# Patient Record
Sex: MALE | Race: WHITE | NOT HISPANIC OR LATINO | Employment: FULL TIME | URBAN - METROPOLITAN AREA
[De-identification: names, ages, dates, MRNs, and addresses within clinical notes are randomized per-mention and may not be internally consistent; named-entity substitution may affect disease eponyms.]

---

## 2018-11-01 ENCOUNTER — COMMUNICATION - HEALTHEAST (OUTPATIENT)
Dept: TELEHEALTH | Facility: CLINIC | Age: 43
End: 2018-11-01

## 2018-11-01 ENCOUNTER — OFFICE VISIT - HEALTHEAST (OUTPATIENT)
Dept: FAMILY MEDICINE | Facility: CLINIC | Age: 43
End: 2018-11-01

## 2018-11-01 ENCOUNTER — AMBULATORY - HEALTHEAST (OUTPATIENT)
Dept: FAMILY MEDICINE | Facility: CLINIC | Age: 43
End: 2018-11-01

## 2018-11-01 DIAGNOSIS — N52.9 ED (ERECTILE DYSFUNCTION): ICD-10-CM

## 2018-11-01 DIAGNOSIS — E66.9 OBESITY: ICD-10-CM

## 2018-11-01 DIAGNOSIS — E78.00 HYPERCHOLESTEREMIA: ICD-10-CM

## 2018-11-01 DIAGNOSIS — E88.810 METABOLIC SYNDROME X: ICD-10-CM

## 2018-11-01 DIAGNOSIS — R73.01 ELEVATED FASTING GLUCOSE: ICD-10-CM

## 2018-11-01 DIAGNOSIS — I10 ESSENTIAL HYPERTENSION: ICD-10-CM

## 2018-11-01 LAB
ANION GAP SERPL CALCULATED.3IONS-SCNC: 12 MMOL/L (ref 5–18)
BUN SERPL-MCNC: 16 MG/DL (ref 8–22)
CALCIUM SERPL-MCNC: 9.6 MG/DL (ref 8.5–10.5)
CHLORIDE BLD-SCNC: 104 MMOL/L (ref 98–107)
CHOLEST SERPL-MCNC: 241 MG/DL
CO2 SERPL-SCNC: 24 MMOL/L (ref 22–31)
CREAT SERPL-MCNC: 1.19 MG/DL (ref 0.7–1.3)
FASTING STATUS PATIENT QL REPORTED: YES
GFR SERPL CREATININE-BSD FRML MDRD: >60 ML/MIN/1.73M2
GLUCOSE BLD-MCNC: 100 MG/DL (ref 70–125)
HBA1C MFR BLD: 5.4 % (ref 3.5–6)
HDLC SERPL-MCNC: 35 MG/DL
LDLC SERPL CALC-MCNC: 176 MG/DL
POTASSIUM BLD-SCNC: 4.7 MMOL/L (ref 3.5–5)
SODIUM SERPL-SCNC: 140 MMOL/L (ref 136–145)
TRIGL SERPL-MCNC: 151 MG/DL

## 2018-11-01 ASSESSMENT — MIFFLIN-ST. JEOR: SCORE: 2101.43

## 2018-11-23 ENCOUNTER — AMBULATORY - HEALTHEAST (OUTPATIENT)
Dept: NURSING | Facility: CLINIC | Age: 43
End: 2018-11-23

## 2018-11-23 DIAGNOSIS — I10 ESSENTIAL HYPERTENSION: ICD-10-CM

## 2018-11-26 ENCOUNTER — COMMUNICATION - HEALTHEAST (OUTPATIENT)
Dept: FAMILY MEDICINE | Facility: CLINIC | Age: 43
End: 2018-11-26

## 2019-04-25 ENCOUNTER — OFFICE VISIT - HEALTHEAST (OUTPATIENT)
Dept: FAMILY MEDICINE | Facility: CLINIC | Age: 44
End: 2019-04-25

## 2019-04-25 DIAGNOSIS — I10 ESSENTIAL HYPERTENSION: ICD-10-CM

## 2019-04-25 DIAGNOSIS — R06.83 SNORES: ICD-10-CM

## 2019-04-25 LAB
ANION GAP SERPL CALCULATED.3IONS-SCNC: 18 MMOL/L (ref 5–18)
BUN SERPL-MCNC: 21 MG/DL (ref 8–22)
CALCIUM SERPL-MCNC: 10.4 MG/DL (ref 8.5–10.5)
CHLORIDE BLD-SCNC: 103 MMOL/L (ref 98–107)
CO2 SERPL-SCNC: 20 MMOL/L (ref 22–31)
CREAT SERPL-MCNC: 1.21 MG/DL (ref 0.7–1.3)
GFR SERPL CREATININE-BSD FRML MDRD: >60 ML/MIN/1.73M2
GLUCOSE BLD-MCNC: 98 MG/DL (ref 70–125)
POTASSIUM BLD-SCNC: 4.3 MMOL/L (ref 3.5–5)
SODIUM SERPL-SCNC: 141 MMOL/L (ref 136–145)

## 2019-04-25 ASSESSMENT — MIFFLIN-ST. JEOR: SCORE: 2085.56

## 2019-07-20 ENCOUNTER — APPOINTMENT (OUTPATIENT)
Dept: GENERAL RADIOLOGY | Facility: CLINIC | Age: 44
End: 2019-07-20
Attending: EMERGENCY MEDICINE
Payer: COMMERCIAL

## 2019-07-20 ENCOUNTER — HOSPITAL ENCOUNTER (EMERGENCY)
Facility: CLINIC | Age: 44
Discharge: HOME | End: 2019-07-20
Attending: EMERGENCY MEDICINE | Admitting: EMERGENCY MEDICINE
Payer: COMMERCIAL

## 2019-07-20 ENCOUNTER — RECORDS - HEALTHEAST (OUTPATIENT)
Dept: ADMINISTRATIVE | Facility: OTHER | Age: 44
End: 2019-07-20

## 2019-07-20 VITALS
HEIGHT: 69 IN | DIASTOLIC BLOOD PRESSURE: 72 MMHG | SYSTOLIC BLOOD PRESSURE: 141 MMHG | TEMPERATURE: 98.5 F | HEART RATE: 80 BPM | RESPIRATION RATE: 12 BRPM | BODY MASS INDEX: 38.51 KG/M2 | WEIGHT: 260 LBS | OXYGEN SATURATION: 98 %

## 2019-07-20 DIAGNOSIS — S93.401A SPRAIN OF RIGHT ANKLE, UNSPECIFIED LIGAMENT, INITIAL ENCOUNTER: ICD-10-CM

## 2019-07-20 PROCEDURE — 73610 X-RAY EXAM OF ANKLE: CPT | Mod: RT

## 2019-07-20 PROCEDURE — 99284 EMERGENCY DEPT VISIT MOD MDM: CPT | Mod: 25

## 2019-07-20 PROCEDURE — 73630 X-RAY EXAM OF FOOT: CPT | Mod: RT

## 2019-07-20 PROCEDURE — 99283 EMERGENCY DEPT VISIT LOW MDM: CPT | Mod: Z6 | Performed by: EMERGENCY MEDICINE

## 2019-07-20 RX ORDER — OLMESARTAN MEDOXOMIL AND HYDROCHLOROTHIAZIDE 40/25 40; 25 MG/1; MG/1
1 TABLET ORAL DAILY
COMMUNITY
End: 2022-04-05

## 2019-07-20 ASSESSMENT — ENCOUNTER SYMPTOMS
FEVER: 0
BRUISES/BLEEDS EASILY: 0
WOUND: 0
RESPIRATORY NEGATIVE: 1
GASTROINTESTINAL NEGATIVE: 1

## 2019-07-20 ASSESSMENT — MIFFLIN-ST. JEOR: SCORE: 2054.73

## 2019-07-20 NOTE — ED AVS SNAPSHOT
[facility], [place], Emergency Department  [address] [place] [address]  Phone:  [phone]                                    Yifan Quesada   MRN: [id number]    Department:  [facility], Emergency Department   Date of Visit:  7/20/2019           After Visit Summary Signature Page    I have received my discharge instructions, and my questions have been answered. I have discussed any challenges I see with this plan with the nurse or doctor.    ..........................................................................................................................................  Patient/Patient Representative Signature      ..........................................................................................................................................  Patient Representative Print Name and Relationship to Patient    ..................................................               ................................................  Date                                   Time    ..........................................................................................................................................  Reviewed by Signature/Title    ...................................................              ..............................................  Date                                               Time          22EPIC Rev 08/18

## 2019-07-20 NOTE — DISCHARGE INSTRUCTIONS
Thank you for coming to the [facility] Emergency Department.     Try wearing the walking boot and using crutches for walking as tolerated.     Continue tylenol or ibuprofen according to package instructions as needed for pain.   Elevate and ice the foot as much as possible.     Follow up with Orthopedics (Dr. Rader) in 1-2 weeks if not improving.

## 2019-07-20 NOTE — ED TRIAGE NOTES
"Pt presents ambulatory to triage from home with cane. Pt states this past Thursday while running bases at kickball was turning and felt \"pop\" at right foot.   "

## 2019-07-20 NOTE — ED PROVIDER NOTES
"  History     Chief Complaint   Patient presents with     Foot Pain     HPI  Yifan Quesada is a 43 year old male who presents with right foot pain.   THursday (3 days ago) had pop in lateral right foot while running bases in a ball game.   Able to ambulate but using a cane last night and stepping on heel only for comfort.   INcreased pain and swelling today.   No wounds or discoloration.   Hx of previous ankle injuries.   No achilles pain.     I have reviewed the Medications, Allergies, Past Medical and Surgical History, and Social History in the [employer] system.    Past Medical History:   Diagnosis Date     ED (erectile dysfunction)      GERD (gastroesophageal reflux disease)      Metabolic syndrome      Tendonitis, bicipital     bilateral     White coat hypertension        Past Surgical History:   Procedure Laterality Date     .nasal fracture repair         Family History   Problem Relation Age of Onset     Coronary Artery Disease Father      Other Cancer Mother         lung cancer from smoking     Hypertension Sister        Social History     Tobacco Use     Smoking status: Never Smoker     Smokeless tobacco: Never Used   Substance Use Topics     Alcohol use: Yes     Alcohol/week: 0.0 oz       Review of Systems   Constitutional: Negative for fever.   Respiratory: Negative.    Gastrointestinal: Negative.    Skin: Negative for wound.   Allergic/Immunologic: Negative for immunocompromised state.   Hematological: Does not bruise/bleed easily.   All other systems reviewed and are negative.        Physical Exam   BP: 141/72  Pulse: 84  Temp: 98.5  F (36.9  C)  Resp: 16  Height: 175.3 cm (5' 9\")  Weight: 117.9 kg (260 lb)  SpO2: 97 %      Physical Exam  Gen:A&Ox3, no acute distress  CV:RRR without murmurs  PULM:Clear to auscultation bilaterally  Abd:soft, nontender, nondistended. Bowel sounds present and normal  UE:No traumatic injuries, skin normal  LE: + DP and PT pulses bilaterally. Right foot with tenderness at base of " 5th metatarsal and lateral malleolus with associated soft tissue swelling. Negative squeeze test. No fibular head or knee tenderness.   Neuro:CN II-XII intact, strength 5/5 throughout, sensation to right foot intact  Skin: no rashes or ecchymoses    ED Course        Procedures     Critical Care time:  none    Labs Ordered and Resulted from Time of ED Arrival Up to the Time of Departure from the ED - No data to display         Assessments & Plan (with Medical Decision Making)     43-year-old male presenting with right foot and ankle injury after modest trauma.  Vitals stable.  Exam concerning for possible injury to the base of fifth metatarsal versus ankle sprain.  No injury to the knee, hip, left leg, or back.   X-ray of the right foot and ankle show no acute fractures.  Ankle x-rays demonstrate a widened syndesmosis concerning for high ankle injury.  The patient does not have pain in this area at this time.  Likely due to the previous injury per patient.   Discharged home with supportive care for ankle sprain.  Placed in a cam boot for comfort.  Weightbearing as tolerated with crutches.  Elevation, ice, NSAIDs as needed.   Follow-up with orthopedics given history of previous injuries and widened syndesmosis.    I have reviewed the nursing notes.    I have reviewed the findings, diagnosis, plan and need for follow up with the patient.       Medication List      There are no discharge medications for this visit.         Final diagnoses:   Sprain of right ankle, unspecified ligament, initial encounter       7/20/2019   [facility], [place], EMERGENCY DEPARTMENT    MD JEWELS Carrera Katrina Anne, MD  07/20/19 1521

## 2019-08-07 NOTE — TELEPHONE ENCOUNTER
RECORDS RECEIVED FROM: Ankle sprain, appt per pt.    DATE RECEIVED: 08/21/19   NOTES STATUS DETAILS   OFFICE NOTE from referring provider n/a    OFFICE NOTE from other specialist n/a    DISCHARGE SUMMARY from hospital n/a    DISCHARGE REPORT from the ER Internal 07/20/19   OPERATIVE REPORT n/a    MEDICATION LIST n/a    IMPLANT RECORD/STICKER n/a    LABS     CBC/DIFF n/a    CULTURES n/a    INJECTIONS DONE IN RADIOLOGY n/a    MRI n/a    CT SCAN n/a    XRAYS (IMAGES & REPORTS) Internal 07/20/19   TUMOR     PATHOLOGY  Slides & report n/a

## 2019-08-21 ENCOUNTER — RECORDS - HEALTHEAST (OUTPATIENT)
Dept: ADMINISTRATIVE | Facility: OTHER | Age: 44
End: 2019-08-21

## 2019-08-21 ENCOUNTER — PRE VISIT (OUTPATIENT)
Dept: ORTHOPEDICS | Facility: CLINIC | Age: 44
End: 2019-08-21

## 2019-08-21 ENCOUNTER — OFFICE VISIT (OUTPATIENT)
Dept: ORTHOPEDICS | Facility: CLINIC | Age: 44
End: 2019-08-21
Payer: COMMERCIAL

## 2019-08-21 VITALS — HEIGHT: 70 IN | BODY MASS INDEX: 38.17 KG/M2 | WEIGHT: 266.6 LBS

## 2019-08-21 DIAGNOSIS — S93.401A SPRAIN OF RIGHT ANKLE, UNSPECIFIED LIGAMENT, INITIAL ENCOUNTER: Primary | ICD-10-CM

## 2019-08-21 ASSESSMENT — ENCOUNTER SYMPTOMS
MUSCLE WEAKNESS: 0
JOINT SWELLING: 1
STIFFNESS: 1
BLOATING: 1
ABDOMINAL PAIN: 0
MYALGIAS: 1
NAUSEA: 0
BLOOD IN STOOL: 0
DIARRHEA: 1
NECK PAIN: 1
HEARTBURN: 1
ARTHRALGIAS: 1
JAUNDICE: 0
BACK PAIN: 0
MUSCLE CRAMPS: 0
CONSTIPATION: 0
VOMITING: 0
BOWEL INCONTINENCE: 0
RECTAL PAIN: 0

## 2019-08-21 ASSESSMENT — MIFFLIN-ST. JEOR: SCORE: 2095.54

## 2019-08-21 NOTE — NURSING NOTE
"Reason For Visit:   Chief Complaint   Patient presents with     Consult     Right foot/ankle       Ht 1.77 m (5' 9.69\")   Wt 120.9 kg (266 lb 9.6 oz)   BMI 38.60 kg/m      Pain Assessment  Patient Currently in Pain: Yes  0-10 Pain Scale: 2  Primary Pain Location: Ankle(right lateral)    Gilma Perales ATC    "

## 2019-08-21 NOTE — LETTER
8/21/2019       RE: Yifan Quesada  [address] [facility] [address]     Dear Colleague,    Thank you for referring your patient, Yifan Quesada, to [facility]. Please see a copy of my visit note below.    I was present with the resident during the history and exam.  I discussed the case with the resident and agree with the findings as documented in the assessment and plan.    Orthopaedic Clinic Note 8/21/2019 9:40 AM     Yifan Quesada MRN# [id number]   Age: 43 year old YOB: 1975         Reason for consult: Right ankle sprain   Requesting physician: [facility] ED follow up                 Impression and Recommendation (Resident / Clinician):   Impression:  Yifan Quesada is a 43 year old otherwise healthy male with no significant PMH who presents with right ankle pain, consistent with lateral ankle marc vs partial injury to peroneus brevis.     Recommendations:  Continue non-operative management  Activity modification, ice, compression, elevation  Brace PRN  WBAT RLE  Follow up with Ortho PRN          Chief Complaint:   Right ankle pain          History of Present Illness (Resident / Clinician):   Yifan Quesada is a 43 year old otherwise healthy male with no significant PMH who presents with right ankle pain. Patient was playing kickball 1 month ago, rounding 3rd base, rolled his right ankle, and felt a pop. Immediate pain but able to hobble to the sideline. Attempted to kick again but could not due to pain. Seen in ED where XRs were negative. Given CAM boot, crutches, and told to follow up with Ortho. Has been ambulating. States that pain is slowly bu gradually improving. Pain and No fevers, chills, numbness, tingling, weakness.     History obtained from patient interview and chart review.        Past Medical History:      Past Medical History        Past Medical History:   Diagnosis Date     ED (erectile dysfunction)       GERD  "(gastroesophageal reflux disease)       Metabolic syndrome       Tendonitis, bicipital       bilateral     White coat hypertension                   Past Surgical History:      Past Surgical History         Past Surgical History:   Procedure Laterality Date     .nasal fracture repair                     Social History:      Occupation: ICU nurse at [employer]          Family History:   No family history of anesthesia, bleeding or clotting complications.           Allergies:            Allergies   Allergen Reactions     Hmg-Coa-R Inhibitors Muscle Pain (Myalgia)     Cefazolin Rash     Penicillins Rash     Zithromax [Azithromycin] Rash              Medications:   Medication reviewed with patient and in chart.  Anticoagulation: None  Antibiotics: None          Review of Systems:   A 12 point ROS was conducted and was otherwise negative except for HPI above.          Physical Exam:      Ht 1.77 m (5' 9.69\")   Wt 120.9 kg (266 lb 9.6 oz)   BMI 38.60 kg/m    General: awake, alert, cooperative, no apparent distress, appears stated age  Neurological: A&Ox3, CN II-XII grossly intact  HEENT: normocephalic, atraumatic, EOMI, no scleral icterus  Respiratory: breathing non-labored, no wheezing, no stridor  Cardiovascular: nontachycardic  Skin: no rashes or lesions     Musculoskeletal:     RLE: Mild swelling over lateral midfoot. No gross deformity. Skin intact. Full active/passive ROM of ankle joint w/o pain or crepitus. Fires TA/Gastroc-Soleus/EHL/FHL and everts/inverts ankle with 5/5 strength. SILT in femoral, sural, saphenous, deep peroneal, superficial peroneal, and tibial nerve distributions. Dorsalis pedis and posterior tibial arteries 2+ and foot wwp with BCR.          Imaging:   Review of right ankle and foot XRs from 7/20/2019 demonstrate no fracture or dislocation.          Laboratory date:   CBC:  No results found for: WBC, HGB, PLT     BMP:          Lab Results   Component Value Date      10/24/2016     " POTASSIUM 4.3 10/24/2016     CHLORIDE 108 10/24/2016     CO2 25 10/24/2016     BUN 20 10/24/2016     CR 1.12 10/24/2016     ANIONGAP 9 10/24/2016     CURRY 9.0 10/24/2016      (H) 10/24/2016         Inflammatory Markers:  No results found for: WBC, CRP, SED     Attestation:  This patient was discussed with Dr. Thornton who agrees with the above assessment and plan.     Ham Munoz MD, PhD  Orthopaedic Surgery PGY4  Pager: [phone]     Please page me directly with any questions/concerns during regular weekday hours before 5pm. If there is no response, if it is a weekend, or if it is during evening hours then please page the orthopaedic surgery resident on call.     Again, thank you for allowing me to participate in the care of your patient.      Sincerely,    Uzair Thornton MD

## 2019-08-28 NOTE — PROGRESS NOTES
Orthopaedic Clinic Note 8/21/2019 9:40 AM     Yifan Quesada MRN# [id number]   Age: 43 year old YOB: 1975         Reason for consult: Right ankle sprain   Requesting physician: [facility] ED follow up                 Impression and Recommendation (Resident / Clinician):   Impression:  Yifan Quesada is a 43 year old otherwise healthy male with no significant PMH who presents with right ankle pain, consistent with lateral ankle marc vs partial injury to peroneus brevis.     Recommendations:  Continue non-operative management  Activity modification, ice, compression, elevation  Brace PRN  WBAT RLE  Follow up with Ortho PRN          Chief Complaint:   Right ankle pain          History of Present Illness (Resident / Clinician):   Yifan Quesada is a 43 year old otherwise healthy male with no significant PMH who presents with right ankle pain. Patient was playing kickball 1 month ago, rounding 3rd base, rolled his right ankle, and felt a pop. Immediate pain but able to hobble to the sideline. Attempted to kick again but could not due to pain. Seen in ED where XRs were negative. Given CAM boot, crutches, and told to follow up with Ortho. Has been ambulating. States that pain is slowly bu gradually improving. Pain and No fevers, chills, numbness, tingling, weakness.     History obtained from patient interview and chart review.        Past Medical History:      Past Medical History        Past Medical History:   Diagnosis Date     ED (erectile dysfunction)       GERD (gastroesophageal reflux disease)       Metabolic syndrome       Tendonitis, bicipital       bilateral     White coat hypertension                   Past Surgical History:      Past Surgical History         Past Surgical History:   Procedure Laterality Date     .nasal fracture repair                     Social History:      Occupation: ICU nurse at [employer]          Family History:   No family history of anesthesia, bleeding or clotting  "complications.           Allergies:            Allergies   Allergen Reactions     Hmg-Coa-R Inhibitors Muscle Pain (Myalgia)     Cefazolin Rash     Penicillins Rash     Zithromax [Azithromycin] Rash              Medications:   Medication reviewed with patient and in chart.  Anticoagulation: None  Antibiotics: None          Review of Systems:   A 12 point ROS was conducted and was otherwise negative except for HPI above.          Physical Exam:      Ht 1.77 m (5' 9.69\")   Wt 120.9 kg (266 lb 9.6 oz)   BMI 38.60 kg/m    General: awake, alert, cooperative, no apparent distress, appears stated age  Neurological: A&Ox3, CN II-XII grossly intact  HEENT: normocephalic, atraumatic, EOMI, no scleral icterus  Respiratory: breathing non-labored, no wheezing, no stridor  Cardiovascular: nontachycardic  Skin: no rashes or lesions     Musculoskeletal:     RLE: Mild swelling over lateral midfoot. No gross deformity. Skin intact. Full active/passive ROM of ankle joint w/o pain or crepitus. Fires TA/Gastroc-Soleus/EHL/FHL and everts/inverts ankle with 5/5 strength. SILT in femoral, sural, saphenous, deep peroneal, superficial peroneal, and tibial nerve distributions. Dorsalis pedis and posterior tibial arteries 2+ and foot wwp with BCR.          Imaging:   Review of right ankle and foot XRs from 7/20/2019 demonstrate no fracture or dislocation.          Laboratory date:   CBC:  No results found for: WBC, HGB, PLT     BMP:          Lab Results   Component Value Date      10/24/2016     POTASSIUM 4.3 10/24/2016     CHLORIDE 108 10/24/2016     CO2 25 10/24/2016     BUN 20 10/24/2016     CR 1.12 10/24/2016     ANIONGAP 9 10/24/2016     CURRY 9.0 10/24/2016      (H) 10/24/2016         Inflammatory Markers:  No results found for: WBC, CRP, SED     Attestation:  This patient was discussed with Dr. Thornton who agrees with the above assessment and plan.     Ham Munoz MD, PhD  Orthopaedic Surgery PGY4  Pager: " [phone]     Please page me directly with any questions/concerns during regular weekday hours before 5pm. If there is no response, if it is a weekend, or if it is during evening hours then please page the orthopaedic surgery resident on call.

## 2019-09-16 ENCOUNTER — OFFICE VISIT - HEALTHEAST (OUTPATIENT)
Dept: SLEEP MEDICINE | Facility: CLINIC | Age: 44
End: 2019-09-16

## 2019-09-16 DIAGNOSIS — E66.01 MORBID OBESITY (H): ICD-10-CM

## 2019-09-16 DIAGNOSIS — R06.83 SNORING: ICD-10-CM

## 2019-09-16 DIAGNOSIS — R29.818 SUSPECTED SLEEP APNEA: ICD-10-CM

## 2019-09-16 DIAGNOSIS — G47.10 HYPERSOMNIA: ICD-10-CM

## 2019-09-16 ASSESSMENT — MIFFLIN-ST. JEOR: SCORE: 2076.48

## 2019-10-03 ENCOUNTER — RECORDS - HEALTHEAST (OUTPATIENT)
Dept: SLEEP MEDICINE | Facility: CLINIC | Age: 44
End: 2019-10-03

## 2019-10-03 ENCOUNTER — RECORDS - HEALTHEAST (OUTPATIENT)
Dept: ADMINISTRATIVE | Facility: OTHER | Age: 44
End: 2019-10-03

## 2019-10-03 DIAGNOSIS — E66.01 MORBID (SEVERE) OBESITY DUE TO EXCESS CALORIES (H): ICD-10-CM

## 2019-10-03 DIAGNOSIS — R29.818 OTHER SYMPTOMS AND SIGNS INVOLVING THE NERVOUS SYSTEM: ICD-10-CM

## 2019-10-03 DIAGNOSIS — R06.83 SNORING: ICD-10-CM

## 2019-10-03 DIAGNOSIS — G47.10 HYPERSOMNIA, UNSPECIFIED: ICD-10-CM

## 2019-10-10 ENCOUNTER — COMMUNICATION - HEALTHEAST (OUTPATIENT)
Dept: SLEEP MEDICINE | Facility: CLINIC | Age: 44
End: 2019-10-10

## 2019-10-10 ENCOUNTER — AMBULATORY - HEALTHEAST (OUTPATIENT)
Dept: SLEEP MEDICINE | Facility: CLINIC | Age: 44
End: 2019-10-10

## 2019-10-10 DIAGNOSIS — G47.33 OBSTRUCTIVE SLEEP APNEA: ICD-10-CM

## 2019-10-29 ENCOUNTER — OFFICE VISIT - HEALTHEAST (OUTPATIENT)
Dept: FAMILY MEDICINE | Facility: CLINIC | Age: 44
End: 2019-10-29

## 2019-10-29 DIAGNOSIS — I10 ESSENTIAL HYPERTENSION: ICD-10-CM

## 2019-10-29 DIAGNOSIS — G47.33 OSA ON CPAP: ICD-10-CM

## 2019-10-29 LAB
ANION GAP SERPL CALCULATED.3IONS-SCNC: 10 MMOL/L (ref 5–18)
BUN SERPL-MCNC: 18 MG/DL (ref 8–22)
CALCIUM SERPL-MCNC: 9.8 MG/DL (ref 8.5–10.5)
CHLORIDE BLD-SCNC: 104 MMOL/L (ref 98–107)
CO2 SERPL-SCNC: 28 MMOL/L (ref 22–31)
CREAT SERPL-MCNC: 1.38 MG/DL (ref 0.7–1.3)
GFR SERPL CREATININE-BSD FRML MDRD: 56 ML/MIN/1.73M2
GLUCOSE BLD-MCNC: 101 MG/DL (ref 70–125)
POTASSIUM BLD-SCNC: 4.9 MMOL/L (ref 3.5–5)
SODIUM SERPL-SCNC: 142 MMOL/L (ref 136–145)

## 2019-10-31 ENCOUNTER — AMBULATORY - HEALTHEAST (OUTPATIENT)
Dept: FAMILY MEDICINE | Facility: CLINIC | Age: 44
End: 2019-10-31

## 2019-10-31 DIAGNOSIS — R94.4 DECREASED GFR: ICD-10-CM

## 2019-11-03 ENCOUNTER — COMMUNICATION - HEALTHEAST (OUTPATIENT)
Dept: FAMILY MEDICINE | Facility: CLINIC | Age: 44
End: 2019-11-03

## 2019-11-03 DIAGNOSIS — I10 ESSENTIAL HYPERTENSION: ICD-10-CM

## 2020-01-20 ENCOUNTER — OFFICE VISIT - HEALTHEAST (OUTPATIENT)
Dept: SLEEP MEDICINE | Facility: CLINIC | Age: 45
End: 2020-01-20

## 2020-01-20 DIAGNOSIS — R03.0 ELEVATED BLOOD PRESSURE READING: ICD-10-CM

## 2020-01-20 DIAGNOSIS — G47.10 HYPERSOMNIA: ICD-10-CM

## 2020-01-20 DIAGNOSIS — G47.33 OBSTRUCTIVE SLEEP APNEA: ICD-10-CM

## 2020-01-20 ASSESSMENT — MIFFLIN-ST. JEOR: SCORE: 2118.21

## 2020-05-22 ENCOUNTER — OFFICE VISIT - HEALTHEAST (OUTPATIENT)
Dept: SLEEP MEDICINE | Facility: CLINIC | Age: 45
End: 2020-05-22

## 2020-05-22 DIAGNOSIS — G47.26 CIRCADIAN RHYTHM SLEEP DISORDER, SHIFT WORK TYPE: ICD-10-CM

## 2020-05-22 DIAGNOSIS — G47.10 HYPERSOMNIA: ICD-10-CM

## 2020-05-22 DIAGNOSIS — G47.33 OBSTRUCTIVE SLEEP APNEA: ICD-10-CM

## 2020-09-15 ENCOUNTER — COMMUNICATION - HEALTHEAST (OUTPATIENT)
Dept: FAMILY MEDICINE | Facility: CLINIC | Age: 45
End: 2020-09-15

## 2020-09-17 ENCOUNTER — COMMUNICATION - HEALTHEAST (OUTPATIENT)
Dept: FAMILY MEDICINE | Facility: CLINIC | Age: 45
End: 2020-09-17

## 2020-10-21 ENCOUNTER — OFFICE VISIT - HEALTHEAST (OUTPATIENT)
Dept: FAMILY MEDICINE | Facility: CLINIC | Age: 45
End: 2020-10-21

## 2020-10-21 DIAGNOSIS — E88.810 METABOLIC SYNDROME X: ICD-10-CM

## 2020-10-21 DIAGNOSIS — E78.00 HYPERCHOLESTEREMIA: ICD-10-CM

## 2020-10-21 DIAGNOSIS — N52.9 VASCULOGENIC ERECTILE DYSFUNCTION, UNSPECIFIED VASCULOGENIC ERECTILE DYSFUNCTION TYPE: ICD-10-CM

## 2020-10-21 DIAGNOSIS — R73.01 ELEVATED FASTING GLUCOSE: ICD-10-CM

## 2020-10-21 DIAGNOSIS — Z00.00 ROUTINE GENERAL MEDICAL EXAMINATION AT A HEALTH CARE FACILITY: ICD-10-CM

## 2020-10-21 DIAGNOSIS — Z78.9 STATIN INTOLERANCE: ICD-10-CM

## 2020-10-21 DIAGNOSIS — R94.4 DECREASED GFR: ICD-10-CM

## 2020-10-21 DIAGNOSIS — Z82.49 FAMILY HISTORY OF HEART DISEASE IN MALE FAMILY MEMBER BEFORE AGE 55: ICD-10-CM

## 2020-10-21 DIAGNOSIS — Z11.4 SCREENING FOR HIV WITHOUT PRESENCE OF RISK FACTORS: ICD-10-CM

## 2020-10-21 DIAGNOSIS — I10 ESSENTIAL HYPERTENSION: ICD-10-CM

## 2020-10-21 DIAGNOSIS — G47.33 OSA ON CPAP: ICD-10-CM

## 2020-10-21 LAB
ALT SERPL W P-5'-P-CCNC: 63 U/L (ref 0–45)
ANION GAP SERPL CALCULATED.3IONS-SCNC: 12 MMOL/L (ref 5–18)
BUN SERPL-MCNC: 24 MG/DL (ref 8–22)
CALCIUM SERPL-MCNC: 9.6 MG/DL (ref 8.5–10.5)
CHLORIDE BLD-SCNC: 104 MMOL/L (ref 98–107)
CHOLEST SERPL-MCNC: 225 MG/DL
CO2 SERPL-SCNC: 26 MMOL/L (ref 22–31)
CREAT SERPL-MCNC: 1.25 MG/DL (ref 0.7–1.3)
FASTING STATUS PATIENT QL REPORTED: YES
GFR SERPL CREATININE-BSD FRML MDRD: >60 ML/MIN/1.73M2
GLUCOSE BLD-MCNC: 107 MG/DL (ref 70–125)
HBA1C MFR BLD: 5.6 %
HDLC SERPL-MCNC: 30 MG/DL
HIV 1+2 AB+HIV1 P24 AG SERPL QL IA: NEGATIVE
LDLC SERPL CALC-MCNC: 146 MG/DL
POTASSIUM BLD-SCNC: 4.5 MMOL/L (ref 3.5–5)
SODIUM SERPL-SCNC: 142 MMOL/L (ref 136–145)
TRIGL SERPL-MCNC: 245 MG/DL

## 2020-10-21 ASSESSMENT — MIFFLIN-ST. JEOR: SCORE: 2117.31

## 2020-10-22 LAB — LIPOPROTEIN (A) - HISTORICAL: <6 MG/DL

## 2020-10-25 ENCOUNTER — COMMUNICATION - HEALTHEAST (OUTPATIENT)
Dept: FAMILY MEDICINE | Facility: CLINIC | Age: 45
End: 2020-10-25

## 2020-10-26 ENCOUNTER — AMBULATORY - HEALTHEAST (OUTPATIENT)
Dept: FAMILY MEDICINE | Facility: CLINIC | Age: 45
End: 2020-10-26

## 2020-10-26 DIAGNOSIS — R74.01 ELEVATED ALT MEASUREMENT: ICD-10-CM

## 2020-10-27 ENCOUNTER — HOSPITAL ENCOUNTER (OUTPATIENT)
Dept: ULTRASOUND IMAGING | Facility: CLINIC | Age: 45
Discharge: HOME | End: 2020-10-27
Attending: FAMILY MEDICINE

## 2020-10-27 DIAGNOSIS — R74.01 ELEVATED ALT MEASUREMENT: ICD-10-CM

## 2020-10-29 ENCOUNTER — COMMUNICATION - HEALTHEAST (OUTPATIENT)
Dept: FAMILY MEDICINE | Facility: CLINIC | Age: 45
End: 2020-10-29

## 2020-12-13 ENCOUNTER — HEALTH MAINTENANCE LETTER (OUTPATIENT)
Age: 45
End: 2020-12-13

## 2021-04-26 ENCOUNTER — OFFICE VISIT - HEALTHEAST (OUTPATIENT)
Dept: FAMILY MEDICINE | Facility: CLINIC | Age: 46
End: 2021-04-26

## 2021-04-26 DIAGNOSIS — E88.810 METABOLIC SYNDROME X: ICD-10-CM

## 2021-04-26 DIAGNOSIS — Z78.9 STATIN INTOLERANCE: ICD-10-CM

## 2021-04-26 DIAGNOSIS — E78.00 HYPERCHOLESTEREMIA: ICD-10-CM

## 2021-04-26 DIAGNOSIS — K76.0 NAFLD (NONALCOHOLIC FATTY LIVER DISEASE): ICD-10-CM

## 2021-04-26 DIAGNOSIS — L98.9 SKIN LESION: ICD-10-CM

## 2021-04-26 DIAGNOSIS — I10 ESSENTIAL HYPERTENSION: ICD-10-CM

## 2021-04-26 DIAGNOSIS — Z82.49 FAMILY HISTORY OF HEART DISEASE IN MALE FAMILY MEMBER BEFORE AGE 55: ICD-10-CM

## 2021-05-28 NOTE — PROGRESS NOTES
"Assessment/Plan:    Essential hypertension  Blood pressure control adequate.  Patient is working excessive hours with little opportunity to improve lifestyle and lose weight.  Snores and likely has sleep apnea.     -Continue current therapy.  Check basic metabolic panel.  - Referral for sleep medicine evaluation    Snores  Obstructive sleep apnea.  Discussed this is a risk factor for heart disease, blood pressure, arrhythmias.    Return in about 6 months (around 10/25/2019) for Hypertension.    Devang Sepulveda MD  _______________________________    Chief Complaint   Patient presents with     Follow-up     blood pressure      Subjective: Yifan Quesada is a 43 y.o. year old male who returns to clinic for the following chronic complaints/concerns:     Blood Pressure:   - doing well.  No lightheadedness.  No dizziness.  BP at work with checks has been okay.  No cough.  No cost barriers. Sleep: girlfriend says that snoring is worse.  History of nose injury.      Review of systems is negative except for as shown in the HPI.    The following portions of the patient's history were reviewed and updated as appropriate: allergies, current medications, past medical history and problem list.    Objective:    height is 5' 9\" (1.753 m) and weight is 269 lb (122 kg) (abnormal). His blood pressure is 134/74 and his pulse is 78. His respiration is 20 and oxygen saturation is 98%.   Gen: No acute distress, pleasant.  Oropharynx: Mallampati 3  Cardiac: Regular rate and rhythm, normal S1/S2, no murmurs of the gallops  Respiratory: Clear to auscultation bilaterally.  Psych: Normal affect    STOP-BAN+    No results found for this or any previous visit (from the past 24 hour(s)).    Additional History from Old Records Summarized (2): no  Decision to Obtain Records (1): no  Radiology Tests Summarized or Ordered (1): no  Labs Reviewed or Ordered (1): no  Medicine Test Summarized or Ordered (1): no  Independent Review of EKG or X-RAY(2 " each): no    This note has been dictated using voice recognition software. Any grammatical or context distortions are unintentional and inherent to the software

## 2021-06-01 NOTE — PATIENT INSTRUCTIONS - HE
What is a Home Sleep Study?    your doctor can give you a portable sleep monitor to use at home, so you don t have to spend the night in the sleep lab. But you should use a portable monitor only if:   ?Your doctor thinks you have a condition that makes you stop breathing for short periods while you are asleep, called  sleep apnea.    ?You do not have other serious medical problems, such as heart disease or lung disease.    Please bring the home sleep study device back to the sleep center as soon as you are finished with it so we can score it.     The cost of care estimate line is [phone]. They are able to give the patient an estimate of the charges and also an estimate of their insurance coverage/patient responsibility.

## 2021-06-01 NOTE — PROGRESS NOTES
Dear Devang Blackwell Md  [address] [place] [address]    Thank you for the opportunity to participate in the care of Mr. Yifan Quesada.    He is a 43 y.o. male who comes to the clinic with a chief complaint of excessive daytime sleepiness is been going on for 1 to 2 years.  The patient has been informed by his spouse that he has significant pauses in his breathing during sleep followed by loud snoring.  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  The patient's review of systems is otherwise negative.     Past Medical History  Past Medical History:   Diagnosis Date     Hypertension         Past Surgical History  Past Surgical History:   Procedure Laterality Date     NASAL FRACTURE SURGERY          Meds  Current Outpatient Medications   Medication Sig Dispense Refill     famotidine (FOR PEPCID) 10 MG tablet Take 10 mg by mouth 2 (two) times a day as needed.              olmesartan-hydrochlorothiazide (BENICAR HCT) 40-25 mg per tablet Take 1 tablet by mouth daily. 90 tablet 1     tadalafil (CIALIS) 20 MG tablet Take 20 mg by mouth.       No current facility-administered medications for this visit.         Allergies  Statins-hmg-coa reductase inhibitors; Azithromycin; Cefazolin; and Penicillins     Social History  Social History     Socioeconomic History     Marital status: Single     Spouse name: Not on file     Number of children: Not on file     Years of education: Not on file     Highest education level: Not on file   Occupational History     Not on file   Social Needs     Financial resource strain: Not on file     Food insecurity:     Worry: Not on file     Inability: Not on file     Transportation needs:     Medical: Not on file     Non-medical: Not on file   Tobacco Use     Smoking status: Former Smoker     Smokeless tobacco: Never Used     Tobacco comment: in college    Substance and Sexual Activity     Alcohol use: Yes     Comment: 2 drinks per month       Drug use: No     Sexual activity: Yes   Lifestyle     Physical activity:     Days per week: Not on file     Minutes per session: Not on file     Stress: Not on file   Relationships     Social connections:     Talks on phone: Not on file     Gets together: Not on file     Attends Advent service: Not on file     Active member of club or organization: Not on file     Attends meetings of clubs or organizations: Not on file     Relationship status: Not on file     Intimate partner violence:     Fear of current or ex partner: Not on file     Emotionally abused: Not on file     Physically abused: Not on file     Forced sexual activity: Not on file   Other Topics Concern     Not on file   Social History Narrative     Not on file        Family History  Family History   Problem Relation Age of Onset     Cancer Mother         lung      Depression Mother      Schizophrenia Mother      Other Mother         low blood sugars      Heart attack Father 48     Hypertension Sister      Depression Sister      Heart failure Maternal Grandmother      Hypertension Maternal Grandfather      Hypertension Paternal Grandmother      Heart failure Paternal Grandmother      Breast cancer Paternal Grandmother      Prostate cancer Paternal Grandfather      Colon cancer Neg Hx         Review of Systems:  Constitutional: Negative except as noted in HPI.   Eyes: Negative except as noted in HPI.   ENT: Negative except as noted in HPI.   Cardiovascular: Negative except as noted in HPI.   Respiratory: Negative except as noted in HPI.   Gastrointestinal: Negative except as noted in HPI.   Genitourinary: Negative except as noted in HPI.   Musculoskeletal: Negative except as noted in HPI.   Integumentary: Negative except as noted in HPI.   Neurological: Negative except as noted in HPI.   Psychiatric: Negative except as noted in HPI.   Endocrine: Negative except as noted in HPI.   Hematologic/Lymphatic: Negative except as noted in HPI.      STOP BANG  "9/16/2019   Do you snore loudly (louder than talking or loud enough to be heard through closed doors)? 1   Do you often feel tired, fatigued, or sleepy during daytime? 1   Has anyone observed you stop breathing in your sleep? 1   Do you have or are you being treated for high blood pressure? 1   BMI more than 35 kg/m2 1   Age over 50 years old? 0   Neck circumference greater than 16 inches? 1   Gender male? 0   Total Score 6     Epworths Sleepiness Scale 9/16/2019   Sitting and reading 2   Watching TV 3   Sitting, inactive in a public place (e.g. a theatre or a meeting) 1   As a passenger in a car for an hour without a break 1   Lying down to rest in the afternoon when circumstances permit 3   Sitting and talking to someone 1   Sitting quietly after a lunch without alcohol 3   In a car, while stopped for a few minutes in traffic 1   Total score 15     Rooming 9/16/2019   Usual bedtime 10:30am-12pm   Sleep Latency 10-20 minutes   Awakenings at least once   Wake Up Time 5:30pm   Weekends 5:30pm   Energy Drinks 0   Coffee 0   Cola 2 daily when working, 0-1 when not   Difficulty falling asleep No   Difficulty staying asleep Yes   Excessive daytime tiredness Yes   Excessive daytime sleepiness Yes   Dozing off while driving Yes   Shift Worker Yes   Sleep Walking? No   Sleep Talking? Yes   Kicking or punching? Yes   Restless legs symptoms No       Physical Exam:  /74 (Patient Site: Right Arm, Patient Position: Sitting, Cuff Size: Adult Large)   Pulse (!) 102   Ht 5' 9\" (1.753 m)   Wt (!) 267 lb (121.1 kg)   SpO2 96%   BMI 39.43 kg/m    BMI:Body mass index is 39.43 kg/m .   GEN: NAD, obese  Head: Normocephalic.  EYES: PERRLA, EOMI  ENT: Oropharynx is clear, Brennan class 4+ airway.  Nasal mucosa is moist without erythema  Neck : Thyroid is within normal limits. Neck Circ 19\"  CV: Regular rate and rhythm, S1 & S2 positive.  LUNGS: Bilateral breathsounds heard.   ABDOMEN: Positive bowel sounds in all quadrants, " soft, no rebound or guarding  MUSCULOSKELETAL: Bilateral trace leg swelling  SKIN: warm, dry, no rashes  Neurological: Alert, oriented to time, place, and person.  Psych: normal mood, normal affect     Labs/Studies:     No results found for: WBC, HGB, HCT, MCV, PLT      Chemistry        Component Value Date/Time     04/25/2019 1043    K 4.3 04/25/2019 1043     04/25/2019 1043    CO2 20 (L) 04/25/2019 1043    BUN 21 04/25/2019 1043    CREATININE 1.21 04/25/2019 1043    GLU 98 04/25/2019 1043        Component Value Date/Time    CALCIUM 10.4 04/25/2019 1043            No results found for: FERRITIN  No results found for: TSH      Assessment and Plan:  In summary Yifan Quesada is a 43 y.o. year old male here for sleep disturbance.  1.  Suspected sleep apnea   Mr. Yifan Quesada has high risk for obstructive sleep apnea based on the history of witnessed apnea, snoring and a crowded airway. I educated the patient on the underlying pathophysiology of obstructive sleep apnea. We reviewed the risks associated with sleep apnea, including increased cardiovascular risk and overall death. We talked about treatments briefly. I recommend getting a Home sleep study. The patient should return to the clinic to discuss results and treatment option in a patient-centered approach.  2.  Hypersomnia  3.  Snoring  4.  Obesity    History of cranial facial surgery? Yes.  History of multiple nasal maxillary trauma status post surgery.  Will need ENT evaluation prior to initiating therapy if he qualifies.    Patient verbalized understanding of these issues, agrees with the plan and all questions were answered today. Patient was given an opportuntity to voice any other symptoms or concerns not listed above. Patient did not have any other symptoms or concerns.      Patient told to return in one week after the sleep study is interpreted.      Salas Pepper DO  Board Certified in Internal Medicine and Sleep Medicine  [facility].    (Note created with Dragon voice recognition and unintended spelling errors and word substitutions may occur)

## 2021-06-02 VITALS — HEIGHT: 70 IN | BODY MASS INDEX: 38.51 KG/M2 | WEIGHT: 269 LBS

## 2021-06-02 NOTE — TELEPHONE ENCOUNTER
Refill Approved    Rx renewed per Medication Renewal Policy. Medication was last renewed on 10/29/19.    Cassidy Acevedo, [facility] Connection Triage/Med Refill 11/3/2019     Requested Prescriptions   Pending Prescriptions Disp Refills     olmesartan-hydrochlorothiazide (BENICAR HCT) 40-25 mg per tablet [Pharmacy Med Name: OLMESARTAN MEDOX/HCTZ 40-25MG TAB] 90 tablet 0     Sig: TAKE 1 TABLET BY MOUTH DAILY       Diuretics/Combination Diuretics Refill Protocol  Passed - 11/3/2019 10:21 AM        Passed - Visit with PCP or prescribing provider visit in past 12 months     Last office visit with prescriber/PCP: 10/29/2019 Devang Sepulveda MD OR same dept: 10/29/2019 Devang Sepulveda MD OR same specialty: 10/29/2019 Devang Sepulveda MD  Last physical: Visit date not found Last MTM visit: Visit date not found   Next visit within 3 mo: Visit date not found  Next physical within 3 mo: Visit date not found  Prescriber OR PCP: Devang Sepulveda MD  Last diagnosis associated with med order: 1. Essential hypertension  - olmesartan-hydrochlorothiazide (BENICAR HCT) 40-25 mg per tablet [Pharmacy Med Name: OLMESARTAN MEDOX/HCTZ 40-25MG TAB]; Take 1 tablet by mouth daily.  Dispense: 90 tablet; Refill: 0    If protocol passes may refill for 12 months if within 3 months of last provider visit (or a total of 15 months).             Passed - Serum Potassium in past 12 months      Lab Results   Component Value Date    Potassium 4.9 10/29/2019             Passed - Serum Sodium in past 12 months      Lab Results   Component Value Date    Sodium 142 10/29/2019             Passed - Blood pressure on file in past 12 months     BP Readings from Last 1 Encounters:   10/29/19 134/84             Passed - Serum Creatinine in past 12 months      Creatinine   Date Value Ref Range Status   10/29/2019 1.38 (H) 0.70 - 1.30 mg/dL Final

## 2021-06-02 NOTE — PROGRESS NOTES
Order for Durable Medical Equipment was processed and equipment ordered.     DME provider: [facility]    Date Faxed: 10/10/2019    Ordering Provider: Salas Pepper DO    PAP Order Type: New Device Order    Fax Number: IN HOUSE DME: [facility]

## 2021-06-02 NOTE — PROGRESS NOTES
Assessment/Plan:    JOHNNIE on CPAP  This may decrease his need for antihypertensive medications.  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    Essential hypertension  Normotensive.  No side effects from medications.  No cause prohibition with his current medication.  Check basic metabolic panel.  Adjust therapy based on response to CPAP machine.    Return in about 6 months (around 4/29/2020) for Annual physical, Hypertension.    Devang Sepulveda MD  _______________________________    Chief Complaint   Patient presents with     Follow-up     blood pressure      Subjective: Yifan Quesada is a 43 y.o. year old male who returns to clinic for the following chronic complaints/concerns:     BP:   - planning to start CPAP tomorrow   - going okay with BP   - feeling well otherwise.   - no lightheadedness.  No dizziness.  No swelling.   -  Stress has been high   - work schedule has been variable.      Review of systems is negative except for as shown in the HPI.    The following portions of the patient's history were reviewed and updated as appropriate: allergies, current medications, past medical history and problem list.    Objective:    weight is 269 lb (122 kg) (abnormal). His oral temperature is 99.1  F (37.3  C). His blood pressure is 134/84 and his pulse is 92. His respiration is 18 and oxygen saturation is 99%.   Gen: No acute distress, pleasant.  Psych: Normal affect    The patient's most recent sleep medicine consultation notes were reviewed.  We also reviewed his home sleep study with the patient.    Recent Results (from the past 24 hour(s))   Basic Metabolic Panel   Result Value Ref Range    Sodium 142 136 - 145 mmol/L    Potassium 4.9 3.5 - 5.0 mmol/L    Chloride 104 98 - 107 mmol/L    CO2 28 22 - 31 mmol/L    Anion Gap, Calculation 10 5 - 18 mmol/L    Glucose 101 70 - 125 mg/dL    Calcium 9.8 8.5 - 10.5 mg/dL    BUN 18 8 - 22 mg/dL    Creatinine 1.38 (H) 0.70 - 1.30  mg/dL    GFR MDRD Af Amer >60 >60 mL/min/1.73m2    GFR MDRD Non Af Amer 56 (L) >60 mL/min/1.73m2       Additional History from Old Records Summarized (2): no  Decision to Obtain Records (1): no  Radiology Tests Summarized or Ordered (1): no  Labs Reviewed or Ordered (1): no  Medicine Test Summarized or Ordered (1): no  Independent Review of EKG or X-RAY(2 each): no    This note has been dictated using voice recognition software. Any grammatical or context distortions are unintentional and inherent to the software

## 2021-06-02 NOTE — TELEPHONE ENCOUNTER
The overnight polysomnography was reviewed.   The patient had obstructive sleep apnea.    I have called the patient and informed his of the results. I offered the patient the option of getting started on pressure therapy and the patient agreed. The patient would like to use [employer] as his durable medical equipment company.    The prescription is in the chart.    Contact information for [employer]:    [employer] Tel: [phone]    Thank you.

## 2021-06-03 VITALS
RESPIRATION RATE: 18 BRPM | DIASTOLIC BLOOD PRESSURE: 84 MMHG | OXYGEN SATURATION: 99 % | WEIGHT: 269 LBS | TEMPERATURE: 99.1 F | SYSTOLIC BLOOD PRESSURE: 134 MMHG | BODY MASS INDEX: 39.72 KG/M2 | HEART RATE: 92 BPM

## 2021-06-03 VITALS
WEIGHT: 267 LBS | HEART RATE: 102 BPM | BODY MASS INDEX: 39.55 KG/M2 | DIASTOLIC BLOOD PRESSURE: 74 MMHG | OXYGEN SATURATION: 96 % | HEIGHT: 69 IN | SYSTOLIC BLOOD PRESSURE: 122 MMHG

## 2021-06-03 VITALS — HEIGHT: 69 IN | WEIGHT: 269 LBS | BODY MASS INDEX: 39.84 KG/M2

## 2021-06-04 VITALS
OXYGEN SATURATION: 94 % | BODY MASS INDEX: 40.91 KG/M2 | HEART RATE: 88 BPM | DIASTOLIC BLOOD PRESSURE: 82 MMHG | SYSTOLIC BLOOD PRESSURE: 161 MMHG | WEIGHT: 276.2 LBS | HEIGHT: 69 IN

## 2021-06-05 VITALS
RESPIRATION RATE: 18 BRPM | HEIGHT: 69 IN | OXYGEN SATURATION: 98 % | DIASTOLIC BLOOD PRESSURE: 80 MMHG | WEIGHT: 276 LBS | HEART RATE: 76 BPM | SYSTOLIC BLOOD PRESSURE: 126 MMHG | TEMPERATURE: 98.1 F | BODY MASS INDEX: 40.88 KG/M2

## 2021-06-05 VITALS
DIASTOLIC BLOOD PRESSURE: 80 MMHG | OXYGEN SATURATION: 98 % | RESPIRATION RATE: 18 BRPM | WEIGHT: 279 LBS | BODY MASS INDEX: 41.2 KG/M2 | HEART RATE: 80 BPM | SYSTOLIC BLOOD PRESSURE: 120 MMHG

## 2021-06-05 NOTE — PROGRESS NOTES
Order for Durable Medical Equipment was processed and equipment ordered.     DME provider: [facility]    Date Faxed: 1/20/2020    Ordering Provider: Salas Pepper DO    PAP Order Type: Setting/Pressure change    Fax Number: Sent to [facility]

## 2021-06-05 NOTE — PROGRESS NOTES
Dear Devang Blackwell MD  [address] [place] [address],    Thank you for the opportunity to participate in the care of Yifan Quesada.     He is a 44 y.o.  male patient who comes to the sleep medicine clinic for review of his sleep study and compliance download data. The study was completed on 10/3/2019 which showed that the patient had mild obstructive sleep apnea with an apnea hypopnea index of 12.8 events per hour with the lowest O2 sat of 78%.  The patient was informed of the results by phone and offered the option to initiate CPAP therapy which he agreed to.  Since starting CPAP therapy the patient has not reported any improvement in his quality of sleep or energy level.  He does admit that he is suffering from extreme dry mouth with CPAP usage.  We did discuss the option of increasing his humidification and also adding a chinstrap to his current treatment regimen.  He also reports that if his pressure is too high, it would cause discomfort.    Assessment and Plan:  In summary Yifan Quesada is a 44 y.o. year old male here for compliance download.  1. Obstructive sleep apnea  I reviewed the results of the patient's sleep study with him in detail.  I also encouraged him to try to increase his CPAP usage and so far as he can.  He will discuss with the durable medical equipment company about getting a chinstrap.  I will narrow his CPAP pressure range to 7-12 CWP.  We will have the patient test at this pressure setting in front of us.  I welcome the patient to follow-up with me in 4 months.  - CPAP DME Sleep Medicine [facility]    2. Hypersomnia  No change    3. Elevated blood pressure reading  I will have the patient's blood pressure readings repeated during this clinic visit. I strongly advised the patient to follow up with PCP in one week.      Compliance Download data for 30 Days:  Pressure setting: APAP 5-15 CWP  Residual AHI: 0.3 events per hour  Leak: Minimal  Compliance:0  Mask Tolerance:  "Good  Skin irritation: None    Current Outpatient Medications   Medication Sig Dispense Refill     famotidine (FOR PEPCID) 10 MG tablet Take 10 mg by mouth 2 (two) times a day as needed.              olmesartan-hydrochlorothiazide (BENICAR HCT) 40-25 mg per tablet Take 1 tablet by mouth daily. 90 tablet 1     olmesartan-hydrochlorothiazide (BENICAR HCT) 40-25 mg per tablet Take 1 tablet by mouth daily. 90 tablet 3     tadalafil (CIALIS) 20 MG tablet Take 20 mg by mouth daily as needed.              No current facility-administered medications for this visit.        Allergies   Allergen Reactions     Statins-Hmg-Coa Reductase Inhibitors Myalgia     Severe symptoms while on crestor     Azithromycin Rash     ~2010     Cefazolin Rash     Penicillins Rash     While a child.        Epworths Sleepiness Scale 9/16/2019 1/20/2020   Sitting and reading 2 2   Watching TV 3 3   Sitting, inactive in a public place (e.g. a theatre or a meeting) 1 2   As a passenger in a car for an hour without a break 1 2   Lying down to rest in the afternoon when circumstances permit 3 3   Sitting and talking to someone 1 1   Sitting quietly after a lunch without alcohol 3 2   In a car, while stopped for a few minutes in traffic 1 1   Total score 15 16       Physical Exam:  /82 (Patient Site: Right Arm, Patient Position: Sitting, Cuff Size: Adult Large)   Pulse 88   Ht 5' 9\" (1.753 m)   Wt (!) 276 lb 3.2 oz (125.3 kg)   SpO2 94%   BMI 40.79 kg/m    BMI:Body mass index is 40.79 kg/m .   GEN: NAD, obese  Psych: normal mood, normal affect     Labs/Studies:    - We reviewed the results of the overnight PSG as described on the HPI.      Patient verbalized understanding of these issues, agrees with the plan and all questions were answered today. Patient was given an opportuntity to voice any other symptoms or concerns not listed above. Patient did not have any other symptoms or concerns.        Salas Pepper DO  Board Certified in " Internal Medicine and Sleep Medicine      (Note created with Dragon voice recognition and unintended spelling errors and word substitutions may occur)

## 2021-06-08 NOTE — PROGRESS NOTES
"Yifan Quesada is a 44 y.o. male who is being evaluated via a billable telephone visit.      The patient has been notified of following:     \"This telephone visit will be conducted via a call between you and your physician/provider. We have found that certain health care needs can be provided without the need for a physical exam.  This service lets us provide the care you need with a short phone conversation.  If a prescription is necessary we can send it directly to your pharmacy.  If lab work is needed we can place an order for that and you can then stop by our lab to have the test done at a later time.    Telephone visits are billed at different rates depending on your insurance coverage. During this emergency period, for some insurers they may be billed the same as an in-person visit.  Please reach out to your insurance provider with any questions.    If during the course of the call the physician/provider feels a telephone visit is not appropriate, you will not be charged for this service.\"    Patient has given verbal consent to a Telephone visit? Yes    What phone number would you like to be contacted at? [phone]    Patient would like to receive their AVS by AVS Preference: Ed.    Sarah ADAMS CMA, SLEEP MEDICINE, 5/22/2020 10:05 AM    Dear Devang Blackwell MD  [address] [place] [address],    Thank you for the opportunity to participate in the care of Yifan Quesada.     He is a 44 y.o. y/o male patient who comes to the sleep medicine clinic for follow up.  The patient admits that he has not been using his machine as much as he should. He feels that the current pressure setting is comfortable but his mask is irritating him. He would like to try a different mask. 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     Assessment and Plan:  In summary Yifan Quesada is a 44 y.o. year old male who is " here for follow up.    1. Obstructive sleep apnea  I will keep the patient on the same pressure setting for now.     2. Hypersomnia  No change.     3. Circadian rhythm sleep disorder, shift work type  We discussed that having 2 sleep wake cycles is not healthy for the patient. I recommend the patient try to keep the same schedule of sleep on his days off. We also discussed some strategies on how to better perform on the days he is working.       Compliance Download data for 30 Days:  Pressure setting:APAP 7-12 cwp  Residual AHI:0.4 events per hour  Leak:Minimal  Compliance:13%  Mask Tolerance:Poor  Skin irritation:Yes    Sleep-Wake Cycle(work day):    The patient likes to initiate sleep at around 10-11AM with a sleep latency of 30 minutes. The patient has 1 nocturnal awakenings. Final wake up time is around 3PM.    Sleep-Wake Cycle(Off-Work):    The patient likes to initiate sleep at around Midnight with a sleep latency of less than 10 minutes. The patient has 1 nocturnal awakenings. Final wake up time is around 4AM.    Past Medical History:   Diagnosis Date     Hypertension        Past Surgical History:   Procedure Laterality Date     NASAL FRACTURE SURGERY         Social History     Socioeconomic History     Marital status: Single     Spouse name: Not on file     Number of children: Not on file     Years of education: Not on file     Highest education level: Not on file   Occupational History     Not on file   Social Needs     Financial resource strain: Not on file     Food insecurity     Worry: Not on file     Inability: Not on file     Transportation needs     Medical: Not on file     Non-medical: Not on file   Tobacco Use     Smoking status: Former Smoker     Smokeless tobacco: Never Used     Tobacco comment: in college    Substance and Sexual Activity     Alcohol use: Yes     Comment: 2 drinks per month      Drug use: No     Sexual activity: Yes   Lifestyle     Physical activity     Days per week: Not on file      Minutes per session: Not on file     Stress: Not on file   Relationships     Social connections     Talks on phone: Not on file     Gets together: Not on file     Attends Taoist service: Not on file     Active member of club or organization: Not on file     Attends meetings of clubs or organizations: Not on file     Relationship status: Not on file     Intimate partner violence     Fear of current or ex partner: Not on file     Emotionally abused: Not on file     Physically abused: Not on file     Forced sexual activity: Not on file   Other Topics Concern     Not on file   Social History Narrative     Not on file       Current Outpatient Medications   Medication Sig Dispense Refill     famotidine (FOR PEPCID) 10 MG tablet Take 10 mg by mouth 2 (two) times a day as needed.              olmesartan-hydrochlorothiazide (BENICAR HCT) 40-25 mg per tablet Take 1 tablet by mouth daily. 90 tablet 1     olmesartan-hydrochlorothiazide (BENICAR HCT) 40-25 mg per tablet Take 1 tablet by mouth daily. 90 tablet 3     tadalafil (CIALIS) 20 MG tablet Take 20 mg by mouth daily as needed.              No current facility-administered medications for this visit.        Allergies   Allergen Reactions     Statins-Hmg-Coa Reductase Inhibitors Myalgia     Severe symptoms while on crestor     Azithromycin Rash     ~2010     Cefazolin Rash     Penicillins Rash     While a child.        Epworths Sleepiness Scale 9/16/2019 1/20/2020   Sitting and reading 2 2   Watching TV 3 3   Sitting, inactive in a public place (e.g. a theatre or a meeting) 1 2   As a passenger in a car for an hour without a break 1 2   Lying down to rest in the afternoon when circumstances permit 3 3   Sitting and talking to someone 1 1   Sitting quietly after a lunch without alcohol 3 2   In a car, while stopped for a few minutes in traffic 1 1   Total score 15 16       Labs/Studies:    I reviewed the efficacy and compliance report from his device. Data summarized  on the HPI and the PAP compliance flow sheet.      Patient verbalized understanding of these issues, agrees with the plan and all questions were answered today. Patient was given an opportuntity to voice any other symptoms or concerns not listed above. Patient did not have any other symptoms or concerns.      Salas Pepper DO  Board Certified in Internal Medicine and Sleep Medicine    I spent a total of 12 minutes of face-to-face encounter and more than 50% of the encounter was used for counseling or coordination of care.    Audio and visual devices were used for this virtual clinic visit with permission from patient.

## 2021-06-17 NOTE — PATIENT INSTRUCTIONS - HE
"Patient Instructions by Salas Pepper DO at 1/20/2020  2:00 PM     Author: Salas Pepper DO Service: -- Author Type: Physician    Filed: 1/20/2020  2:23 PM Encounter Date: 1/20/2020 Status: Addendum    : Salas Pepper DO (Physician)    Related Notes: Original Note by Salas Pepper DO (Physician) filed at 1/20/2020  2:20 PM         What is sleep apnea?    Sleep apnea is a condition that makes you stop breathing for short periods while you are asleep. There are 2 types of sleep apnea. One is called \"obstructive sleep apnea,\" and the other is called \"central sleep apnea.\"  In obstructive sleep apnea, you stop breathing because your throat narrows or closes (figure 1). In central sleep apnea, you stop breathing because your brain does not send the right signals to your muscles to make you breathe. When people talk about sleep apnea, they are usually referring to obstructive sleep apnea, which is what this article is about.  People with sleep apnea do not know that they stop breathing when they are asleep. But they do sometimes wake up startled or gasping for breath. They also often hear from loved ones that they snore.  What are the symptoms of sleep apnea? -- The main symptoms of sleep apnea are loud snoring, tiredness, and daytime sleepiness. Other symptoms can include:  ?Restless sleep  ?Waking up choking or gasping  ?Morning headaches, dry mouth, or sore throat  ?Waking up often to urinate  ?Waking up feeling unrested or groggy  ?Trouble thinking clearly or remembering things  Some people with sleep apnea don't have symptoms, or they don't know they have them. They might figure that it's normal to be tired or to snore a lot.  Should I see a doctor or nurse? -- Yes. If you think you might have sleep apnea, see your doctor.  Is there a test for sleep apnea? -- Yes. If your doctor or nurse suspects you have sleep apnea, he or she might send you for a \"sleep study.\" Sleep studies can sometimes be " "done at home, but they are usually done in a sleep lab. For the study, you spend the night in the lab, and you are hooked up to different machines that monitor your heart rate, breathing, and other body functions. The results of the test will tell your doctor or nurse if you have the disorder.  Is there anything I can do on my own to help my sleep apnea? -- Yes. Here are some things that might help:  ?Stay off your back when sleeping. (This is not always practical, because people cannot control their position while asleep. Plus, it only helps some people.)  ?Lose weight, if you are overweight  ?Avoid alcohol, because it can make sleep apnea worse  How is sleep apnea treated? -- The most effective treatment for sleep apnea is a device that keeps your airway open while you sleep. Treatment with this device is called \"continuous positive airway pressure,\" or CPAP. People getting CPAP wear a face mask at night that keeps them breathing (figure 2).  If your doctor or nurse recommends a CPAP machine, try to be patient about using it. The mask might seem uncomfortable to wear at first, and the machine might seem noisy, but using the machine can really pay off. People with sleep apnea who use a CPAP machine feel more rested and generally feel better.  There is also another device that you wear in your mouth called an \"oral appliance\" or \"mandibular advancement device.\" It also helps keep your airway open while you sleep.  In rare cases, when nothing else helps, doctors recommend surgery to keep the airway open. Surgery to do this is not always effective, and even when it is, the problem can come back.  Is sleep apnea dangerous? -- It can be. People with sleep apnea do not get good-quality sleep, so they are often tired and not alert. This puts them at risk for car accidents and other types of accidents. Plus, studies show that people with sleep apnea are more likely than others to have high blood pressure, heart attacks, " and other serious heart problems. In people with severe sleep apnea, getting treated (for example, with a CPAP machine) can help prevent some of these problems.    GRAPHICS  Airway in a person with sleep apnea    Normally when a person sleeps, the airway remains open, and air can pass from the nose and mouth to the lungs. In a person with sleep apnea, parts of the throat and mouth drop into the airway and block off the flow of air. This can cause loud snoring and interrupt breathing for short periods.  Graphic 93539 Version 5.0    Continuous positive airway pressure (CPAP) for sleep apnea    The CPAP mask gently blows air into your nose while you sleep. It puts just enough pressure on your airway to keep it from closing. The mask in this picture fits over just the nose. Other CPAP devices have masks that fit over the nose and mouth.     Please follow up with your primary care provider to check your blood pressure in one week if clinically indicated.

## 2021-06-18 NOTE — PATIENT INSTRUCTIONS - HE
"Patient Instructions by Salas Pepper DO at 5/22/2020 11:00 AM     Author: Salas Pepper DO Service: -- Author Type: Physician    Filed: 5/22/2020 10:26 AM Encounter Date: 5/22/2020 Status: Signed    : Salas Pepper DO (Physician)         Shift Worker Recommendations    Individuals who work night shifts commonly experience difficulties with both sleep and alertness at desired times, and shift work is increasingly recognized as a risk factor for a variety of adverse health outcomes.  ?While some shift workers show circadian adjustment to their work schedule, many others do not. Up to one-third of shift workers report regular, persistent complaints of insomnia and/or excessive sleepiness that meet formal criteria for shift work disorder (SWD).   ?Shift workers generally have reduced total sleep time over a 24-hour period compared with non-shift workers, and they commonly report difficulty with sleep initiation and maintenance. Disturbances during wakefulness include excessive sleepiness, impaired cognitive function, decreased psychomotor functioning, and altered social and emotional functioning.   ?The evaluation of shift workers who complain of sleep or wake disturbances includes a comprehensive sleep history and risk assessment as well as an objective assessment of sleep-wake patterns. Sleep logs and actigraphy are the primary tools used to objectively determine sleep-wake patterns over an extended period (ideally two weeks).   ?Basic measures to improve daytime sleep after a night shift include sleeping at regular times each day if possible, use of light-blocking shades, and control of ambient noise. If family or social responsibilities prohibit one long sleep period, we encourage use of a regularized four-hour \"anchor\" sleep that takes priority first thing in the morning, and a second sleep period that can vary around other responsibilities.  ?For patients who have difficulty initiating daytime " sleep despite optimal sleep hygiene and behavioral strategies to appropriately schedule sleep, options include a short-acting hypnotic agent, melatonin, and cognitive behavioral therapy. The choice among these should be individualized based on availability and cost, presence of contraindications, and patient preference. Patients who choose hypnotic therapy should be cautioned about the risk of carry-over sedation effects into the next shift. Exogenous melatonin taken 30 minutes before desired sleep onset is an alternative that is associated with a lower risk of side effects.   ?Optimizing daytime sleep does not always help with sleepiness and function during the night shift, and additional measures may be needed, particularly in patients with circadian misalignment.  ?When feasible, scheduled brief naps (up to 45 minutes) before and during the night shift are a low-risk intervention that can improve alertness; caffeine intake during the shift can also help.

## 2021-06-20 NOTE — LETTER
Letter by Devang Sepulveda MD at      Author: Devang Sepulveda MD Service: -- Author Type: --    Filed:  Encounter Date: 9/17/2020 Status: (Other)         Yifan Quesada   [address] [facility] [place] [address]      9/17/2020       Dear Yifan,       In reviewing your records, we have determined a gap in your preventive services. Based on your age and health history, we recommend the follow service.     ? General Physical  ? Physical with a Pap Smear  ? Colon cancer screening  ? Mammogram  ? Immunization  ? Diabetic check  ? Blood pressure/cardiovascular check  ? Asthma check  ? Cholesterol test  ? Lab work  ? Med check      If you have had the service elsewhere, please contact us so we can update our records. Please let us know if you have transferred your care to another clinic.    Please call [phone] to schedule this appointment.    We believe that a strong preventive care program, including regular physicals and follow-up care is an important part of a healthy lifestyle and we are committed to helping you maintain your health.    Thank you for choosing us as your health care provider.    Sincerely,     [facility]

## 2021-06-21 NOTE — PROGRESS NOTES
Assessment/Plan:    Problem List Items Addressed This Visit        ENT/CARD/PULM/ENDO Problems    Hypercholesteremia - Primary    Relevant Orders    Lipid Cascade (Completed)    Essential hypertension     Resume therapy.  Olmesartan-hydrochlorothiazide was prescribed at his previous dose.  Return to clinic in 3 weeks for a blood pressure measurement.         Relevant Orders    Basic Metabolic Panel (Completed)       Other    Elevated fasting glucose     Last year, the patient had unexpectedly elevated fasting glucose levels.  His hemoglobin A1c is in the normal range today.  He does have changes on his physical exam consistent with insulin resistance.  We discussed weight loss as a way of avoiding prediabetes, in particular in light of his laboratory tests which are consistent with metabolic syndrome.         Relevant Orders    Glycosylated Hemoglobin A1c (Completed)    Basic Metabolic Panel (Completed)    ED (erectile dysfunction)    Obesity     Patient and I discussed changes in lifestyle to help improve his diet and level of activity.  Follow-up in 3-6 months as part of blood pressure visit.         Metabolic syndrome X        Return in about 3 weeks (around 11/22/2018) for Nurse only visit in three weeks.    Devang Sepulveda MD  _______________________________    Chief Complaint   Patient presents with     Establish Care     pt here to discuss blood pressure-pt is fasting for lab work      Subjective: Yifan Quesada is a 42 y.o. year old male who returns to clinic for the following chronic complaints/concerns:     Establish care visit:   -Hypertension: The patient describes a 30-year history of hypertension starting while he was a teenager.  He is currently off of therapy and has been measuring his blood pressure at work using a hospital grade blood pressure cuff with systolic blood pressures consistently in the 140s.  He has a long history of diastolic hypertension.  Most recently he has been on olmesartan  "-HCTZ combination which he says has worked as well as any medication is ever been on.  Specifically, today he has come to clinic to resume this therapy.  He denies lightheadedness, dizziness, chest pain, chest tightness, shortness of breath, changes in fatigue.  He attributes weight gain over the past couple of years to his work schedule.  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  -Weight: Patient is sedentary with the exception of his job.  He previously did kettle bells on a regular basis.  He identifies this as an opportunity to improve his health.  -Hypercholesterolemia/metabolic syndrome: Patient has laboratory evidence of elevated fasting glucose levels as well as hypertriglyceridemia.  In the past he has been on rosuvastatin.  He developed severe myalgias and fatigue which resolved when he discontinued the medication.  He did not have laboratory evidence of rhabdomyolysis.  He is not interested in being on a statin therapy as result of his experience in the past.    Review of systems is negative except for as shown in the HPI.    The following portions of the patient's history were reviewed and updated as appropriate: allergies, current medications, past family history, past medical history, past social history, past surgical history and problem list.    Objective:    height is 5' 10\" (1.778 m) and weight is 269 lb (122 kg) (abnormal). His oral temperature is 98.1  F (36.7  C). His blood pressure is 148/100 (abnormal) and his pulse is 76. His respiration is 20 and oxygen saturation is 98%.   General: No acute distress  Eyes: No conjunctival injection, no scleral icterus.  ENT: No submandibular lymphadenopathy.  No anterior lymphadenopathy.  No thyromegaly.  Cardiac: Regular rate and rhythm, normal S1/S2, no murmurs of the gallops, no edema the ankles bilaterally.  Respiratory: Clear to auscultation bilaterally.  Skin: Mild changes consistent with acanthosis nigricans on " his posterior neck.  Psych: Normal affect.  Neurologic: Cranial nerves II through XII grossly intact.  Walks with 5/5 strength in upper and lower extremities.  Alert and oriented x3.    Lipid panel in October 2016 showed a total cholesterol of 232.  Triglycerides 313.  HDL cholesterol 30.  LDL cholesterol 139.  TSH was normal on the day.  Glucose measurement was 103.    Recent Results (from the past 24 hour(s))   Lipid Cascade   Result Value Ref Range    Cholesterol 241 (H) <=199 mg/dL    Triglycerides 151 (H) <=149 mg/dL    HDL Cholesterol 35 (L) >=40 mg/dL    LDL Calculated 176 (H) <=129 mg/dL    Patient Fasting > 8hrs? Yes    Glycosylated Hemoglobin A1c   Result Value Ref Range    Hemoglobin A1c 5.4 3.5 - 6.0 %   Basic Metabolic Panel   Result Value Ref Range    Sodium 140 136 - 145 mmol/L    Potassium 4.7 3.5 - 5.0 mmol/L    Chloride 104 98 - 107 mmol/L    CO2 24 22 - 31 mmol/L    Anion Gap, Calculation 12 5 - 18 mmol/L    Glucose 100 70 - 125 mg/dL    Calcium 9.6 8.5 - 10.5 mg/dL    BUN 16 8 - 22 mg/dL    Creatinine 1.19 0.70 - 1.30 mg/dL    GFR MDRD Af Amer >60 >60 mL/min/1.73m2    GFR MDRD Non Af Amer >60 >60 mL/min/1.73m2     The CVD Risk score (BRONSON'Agostino, et al., 2008) failed to calculate for the following reasons:    CVD risk score not calculated    Additional History from Old Records Summarized (2): yes  Decision to Obtain Records (1): no  Radiology Tests Summarized or Ordered (1): no  Labs Reviewed or Ordered (1): yes  Medicine Test Summarized or Ordered (1): no  Independent Review of EKG or X-RAY(2 each): no    This note has been dictated using voice recognition software. Any grammatical or context distortions are unintentional and inherent to the software

## 2021-06-21 NOTE — PROGRESS NOTES
I met with Yifan Quesada at the request of Dr. Escobar recheck his blood pressure.  Blood pressure medications on the MAR were reviewed with patient.    Patient has taken all medications as per usual regimen: Yes  Patient reports tolerating them without any issues or concerns: Yes and No    Vitals:    11/23/18 1037   BP: 118/70   Patient Site: Left Arm   Patient Position: Sitting   Cuff Size: Adult Large       Blood pressure was taken, previous encounter was reviewed, recorded blood pressure below 140/90.  Patient was discharged and the note will be sent to the provider for final review.

## 2021-06-29 NOTE — PROGRESS NOTES
Progress Notes by Devang Sepulveda MD at 10/21/2020  7:00 AM     Author: Devang Sepulveda MD Service: -- Author Type: Physician    Filed: 10/21/2020  8:01 AM Encounter Date: 10/21/2020 Status: Signed    : Devang Sepulveda MD (Physician)       MALE PREVENTATIVE EXAM    Assessment and Plan:     Patient has been advised of split billing requirements and indicates understanding: Yes    Problem List Items Addressed This Visit     Elevated fasting glucose    Relevant Orders    Glycosylated Hemoglobin A1c    ED (erectile dysfunction)     Longstanding condition.  20 mg tadalafil has been effective.  He will try daily dosing of 2.5 or 5 mg of tadalafil now the tadalafil is generic.         Relevant Medications    tadalafiL (CIALIS) 5 MG tablet    Hypercholesteremia     Patient denied a lengthy conversation regarding cardiovascular risk.  He has been on a cholesterol medication in the past with fairly severe muscle aches.  He has had hypertension since the age of 10.  His father reportedly had an MI at the age of 48.  He is currently on hypertensive medications but not on statin therapy.  His 10-year cardiovascular risk is 5%.  However, given his longstanding hypertension as well as concerns for metabolic syndrome with his family history I believe he should be on some type of LDL modifying agent.  He has not had lipoprotein a measurements.  He has not had any advanced lipid diagnostics.  We will check lipoprotein a as well as fasting lipid panel today.  I sent Vascepa to his pharmacy although I anticipate it may be prohibitively expensive.  We discussed that he may want to talk with a preventative cardiologist to develop strategies to mitigate vascular risk given his multiple risk factors.         Relevant Medications    icosapent ethyL (VASCEPA) 1 gram cap    Other Relevant Orders    ALT (SGPT)    Lipid Cascade FASTING    Glycosylated Hemoglobin A1c    Lipoprotein A    Routine general medical examination at a  health care facility - Primary     This patient is up-to-date with preventative health recommendations.  Weight has continued to creep up mostly as result of poor nutrition and fragmented sleep schedule with lack of exercise.  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         Relevant Orders    Lipid Cascade FASTING    Glycosylated Hemoglobin A1c    Essential hypertension     Longstanding condition.  Currently on stable dose of Benicar.  Well-controlled.  Check basic metabolic panel.         Relevant Medications    olmesartan-hydrochlorothiazide (BENICAR HCT) 40-25 mg per tablet    icosapent ethyL (VASCEPA) 1 gram cap    Other Relevant Orders    Basic Metabolic Panel    Lipoprotein A    Metabolic syndrome X    Relevant Medications    icosapent ethyL (VASCEPA) 1 gram cap    Other Relevant Orders    Glycosylated Hemoglobin A1c    JOHNNIE on CPAP     Uses CPAP when on a regular sleep pattern.          Family history of heart disease in male family member before age 55    Relevant Medications    icosapent ethyL (VASCEPA) 1 gram cap    Other Relevant Orders    Lipoprotein A    Statin intolerance    Relevant Medications    icosapent ethyL (VASCEPA) 1 gram cap    Other Relevant Orders    Lipoprotein A    Decreased GFR     We will recheck a measurement today.  One previous measurement.  Microvascular disease as a result of longstanding hypertension?           Other Visit Diagnoses     Screening for HIV without presence of risk factors        Relevant Orders    HIV Antigen/Antibody Screening [facility]            Next follow up:  Return in about 6 months (around 4/21/2021) for Hypertension.    Immunization Review  Adult Imm Review: No immunizations due today    Pt does not use tobacco products   I discussed the following with the patient:   Adult Healthy Living: Importance of regular exercise  Healthy nutrition  Getting adequate  sleep  Herbal medications/alternative medical therapies    I have had an Advance Directives discussion with the patient.    Subjective:   Chief Complaint: Yifan Quesada is an 44 y.o. male here for a preventative health visit.    Patient has been advised of split billing requirements and indicates understanding: Yes    HPI:      Less exercise given workload with pandemic.  Planning to use nordic track.  Happy that BP is at target today.  Tries to use CPAP as much as possible. Diet is poor. No fasting regimens.      Healthy Habits  Are you taking a daily aspirin? No  Do you typically exercising at least 40 min, 3-4 times per week?  NO  Do you usually eat at least 4 servings of fruit and vegetables a day, include whole grains and fiber and avoid regularly eating high fat foods? NO  Have you had an eye exam in the past two years? Yes  Do you see a dentist twice per year? NO  Do you have any concerns regarding sleep? No    Safety Screen  If you own firearms, are they secured in a locked gun cabinet or with trigger locks? Yes  Do you feel you are safe where you are living?: Yes (10/21/2020  7:09 AM)  Do you feel you are safe in your relationship(s)?: Yes (10/21/2020  7:09 AM)    Review of Systems:  Please see above.  The rest of the review of systems are negative for all systems.     Cancer Screening     Patient has no health maintenance due at this time        Patient Care Team:  Devang Sepulveda MD as PCP - General (Family Medicine)  Devang Sepulveda MD as Assigned PCP  Salas Pepper DO as Physician (Sleep Medicine)    History     Reviewed By Date/Time Sections Reviewed    Devang Sepulveda MD 10/21/2020  7:24 AM Medical, Surgical    Cassidy Sheppard LPN 10/21/2020  7:09 AM Family    Cassidy Sheppard LPN 10/21/2020  7:08 AM Surgical, Tobacco, Alcohol, Drug Use, Family    Cassidy Sheppard LPN 10/21/2020  7:05 AM Tobacco        Objective:   Vital Signs:   Visit Vitals  /80 (Patient  "Site: Left Arm, Patient Position: Sitting, Cuff Size: Adult Large)   Pulse 76   Temp 98.1  F (36.7  C) (Oral)   Resp 18   Ht 5' 9\" (1.753 m)   Wt (!) 276 lb (125.2 kg)   SpO2 98% Comment: room air   BMI 40.76 kg/m      PHYSICAL EXAM  Physical Exam  Vitals signs reviewed.   Constitutional:       General: He is not in acute distress.     Appearance: He is well-developed.   HENT:      Head: Normocephalic and atraumatic.      Right Ear: External ear normal.      Left Ear: External ear normal.      Nose: Nose normal.      Mouth/Throat:      Pharynx: No oropharyngeal exudate.   Eyes:      General: No scleral icterus.        Right eye: No discharge.         Left eye: No discharge.      Conjunctiva/sclera: Conjunctivae normal.      Pupils: Pupils are equal, round, and reactive to light.   Neck:      Musculoskeletal: Normal range of motion.      Thyroid: No thyromegaly.   Cardiovascular:      Rate and Rhythm: Normal rate and regular rhythm.      Heart sounds: Normal heart sounds. No murmur. No friction rub. No gallop.    Pulmonary:      Effort: Pulmonary effort is normal. No respiratory distress.      Breath sounds: Normal breath sounds. No wheezing.   Abdominal:      General: There is no distension.      Palpations: Abdomen is soft. There is no mass.      Tenderness: There is no abdominal tenderness.   Musculoskeletal: Normal range of motion.   Lymphadenopathy:      Cervical: No cervical adenopathy.   Skin:     General: Skin is warm.          Neurological:      Mental Status: He is alert and oriented to person, place, and time.      Cranial Nerves: No cranial nerve deficit.      Motor: No abnormal muscle tone.      Deep Tendon Reflexes: Reflexes are normal and symmetric.   Psychiatric:         Behavior: Behavior normal.         Thought Content: Thought content normal.         Judgment: Judgment normal.           The 10-year ASCVD risk score (Emil MACIE Jr., et al., 2013) is: 5.4%    Values used to calculate the score:      " Age: 44 years      Sex: Male      Is Non- : No      Diabetic: No      Tobacco smoker: No      Systolic Blood Pressure: 126 mmHg      Is BP treated: Yes      HDL Cholesterol: 35 mg/dL      Total Cholesterol: 241 mg/dL         Medication List          Accurate as of October 21, 2020  8:00 AM. If you have any questions, ask your nurse or doctor.            START taking these medications    Vascepa 1 gram Cap  INSTRUCTIONS: Take 2 g by mouth 2 (two) times a day.  Generic drug: icosapent ethyL  Started by: Devang Sepulveda MD           CHANGE how you take these medications    olmesartan-hydrochlorothiazide 40-25 mg per tablet  Also known as: Benicar HCT  INSTRUCTIONS: Take 1 tablet by mouth daily.  What changed: Another medication with the same name was removed. Continue taking this medication, and follow the directions you see here.  Changed by: Devang Sepulveda MD        tadalafiL 5 MG tablet  Also known as: CIALIS  INSTRUCTIONS: Take 0.5-1 tablets (2.5-5 mg total) by mouth daily.  What changed:     medication strength    how much to take    when to take this    reasons to take this  Changed by: Devang Sepulveda MD           CONTINUE taking these medications    famotidine 10 MG tablet  Also known as: for PEPCID  INSTRUCTIONS: Take 10 mg by mouth 2 (two) times a day as needed.               Where to Get Your Medications      These medications were sent to [id number] - [place], [place] - [address] AT [facility] [address] [facility] [address]  [address], [facility] [address]    Phone: [phone]     olmesartan-hydrochlorothiazide 40-25 mg per tablet    Vascepa 1 gram Cap     You can get these medications from any pharmacy    Bring a paper prescription for each of these medications    tadalafiL 5 MG tablet         Additional Screenings Completed Today:

## 2021-06-30 NOTE — PROGRESS NOTES
Progress Notes by Devang Sepulveda MD at 4/26/2021  9:00 AM     Author: Devang Sepulveda MD Service: -- Author Type: Physician    Filed: 4/26/2021  9:30 AM Encounter Date: 4/26/2021 Status: Signed    : Devang Sepulveda MD (Physician)       Assessment/Plan:    Essential hypertension  This patient's hypertension In goal.   - lifestyle: more physical activity after COVID vaccination  - cause of hypertension:  Primary Hypertension  - medicaton side effects: no medication side effects noted  - medications: no change  - labs today: basic metabolic panel  - tobacco: No  - statin indicated: Yes: intolerant.    Hypercholesteremia  This individual states that he was tolerant of the Vascepa.  He ran out last month.  We will plan on resuming and recheck his cholesterol after he has been on this product for couple months.  Also, he will be increasing his physical activity as the limitations to accessing the gym have lifted following his vaccination.    Statin intolerance  Tolerant to vascepa.  We will resume and check a cholesterol panel in few months.    NAFLD (nonalcoholic fatty liver disease)  Ultrasound showing hepatic steatosis.  We will check a comprehensive metabolic panel with her lab draw in a few months.  Anticipate improvement given medication changes as well as increased physical activity.    Skin lesion  Right shoulder.  Likely a transient blemish.  However, he will monitor and if it is still there in a few months we will plan for a punch biopsy.    Return in about 6 months (around 10/26/2021) for Annual physical, Hypertension.    Devang Sepulveda MD  _______________________________    Chief Complaint   Patient presents with   ? Follow-up     blood pressure    ? Medication Questions     should he continue the vascepa?     Subjective: Yifan Quesada is a 45 y.o. year old male who returns to clinic for the following chronic complaints/concerns:     BP:   - no lightheadedness.  No dizziness.  Doing well.   More physical activity.    Cholesterol:    - no side effects on vascepa.  He was taking the medication but ran out.      Vaccinated.      Skin: lesions on left shoulder have not changed.      Review of systems is negative except for as shown in the HPI.    The following portions of the patient's history were reviewed and updated as appropriate: allergies, current medications, past medical history and problem list.    Objective:    weight is 279 lb (126.6 kg) (abnormal). His blood pressure is 120/80 and his pulse is 80. His respiration is 18 and oxygen saturation is 98%.   Physical Exam  Constitutional:       General: He is not in acute distress.     Appearance: Normal appearance.   HENT:      Head: Normocephalic and atraumatic.   Eyes:      General: No scleral icterus.     Conjunctiva/sclera: Conjunctivae normal.   Pulmonary:      Effort: Pulmonary effort is normal.   Skin:     Findings: No rash.          Neurological:      General: No focal deficit present.      Mental Status: He is alert and oriented to person, place, and time.   Psychiatric:         Mood and Affect: Mood normal.         Behavior: Behavior normal.           No data recorded  No data recorded  No data recorded  No data recorded    No results found for this or any previous visit (from the past 24 hour(s)).    This note has been dictated using voice recognition software. Any grammatical or context distortions are unintentional and inherent to the software

## 2021-07-19 ENCOUNTER — LAB (OUTPATIENT)
Dept: LAB | Facility: CLINIC | Age: 46
End: 2021-07-19
Payer: COMMERCIAL

## 2021-07-19 DIAGNOSIS — E88.810 METABOLIC SYNDROME X: ICD-10-CM

## 2021-07-19 DIAGNOSIS — I10 ESSENTIAL HYPERTENSION: ICD-10-CM

## 2021-07-19 DIAGNOSIS — Z82.49 FAMILY HISTORY OF HEART DISEASE IN MALE FAMILY MEMBER BEFORE AGE 55: ICD-10-CM

## 2021-07-19 DIAGNOSIS — K76.0 NAFLD (NONALCOHOLIC FATTY LIVER DISEASE): ICD-10-CM

## 2021-07-19 DIAGNOSIS — E78.00 HYPERCHOLESTEREMIA: ICD-10-CM

## 2021-07-19 LAB
ALBUMIN SERPL-MCNC: 4.1 G/DL (ref 3.5–5)
ALP SERPL-CCNC: 53 U/L (ref 45–120)
ALT SERPL W P-5'-P-CCNC: 70 U/L (ref 0–45)
ANION GAP SERPL CALCULATED.3IONS-SCNC: 13 MMOL/L (ref 5–18)
AST SERPL W P-5'-P-CCNC: 31 U/L (ref 0–40)
BILIRUB SERPL-MCNC: 0.7 MG/DL (ref 0–1)
BUN SERPL-MCNC: 21 MG/DL (ref 8–22)
CALCIUM SERPL-MCNC: 9.6 MG/DL (ref 8.5–10.5)
CHLORIDE BLD-SCNC: 102 MMOL/L (ref 98–107)
CHOLEST SERPL-MCNC: 222 MG/DL
CO2 SERPL-SCNC: 24 MMOL/L (ref 22–31)
CREAT SERPL-MCNC: 1.14 MG/DL (ref 0.7–1.3)
FASTING STATUS PATIENT QL REPORTED: YES
GFR SERPL CREATININE-BSD FRML MDRD: 76 ML/MIN/1.73M2
GLUCOSE BLD-MCNC: 107 MG/DL (ref 70–125)
HDLC SERPL-MCNC: 32 MG/DL
LDLC SERPL CALC-MCNC: 144 MG/DL
POTASSIUM BLD-SCNC: 4 MMOL/L (ref 3.5–5)
PROT SERPL-MCNC: 7.2 G/DL (ref 6–8)
SODIUM SERPL-SCNC: 139 MMOL/L (ref 136–145)
TRIGL SERPL-MCNC: 228 MG/DL

## 2021-07-19 PROCEDURE — 80053 COMPREHEN METABOLIC PANEL: CPT

## 2021-07-19 PROCEDURE — 80061 LIPID PANEL: CPT

## 2021-07-19 PROCEDURE — 36415 COLL VENOUS BLD VENIPUNCTURE: CPT

## 2021-07-19 PROCEDURE — 83036 HEMOGLOBIN GLYCOSYLATED A1C: CPT

## 2021-07-20 LAB — HBA1C MFR BLD: 5.4 %

## 2021-08-02 ENCOUNTER — ALLIED HEALTH/NURSE VISIT (OUTPATIENT)
Dept: FAMILY MEDICINE | Facility: CLINIC | Age: 46
End: 2021-08-02
Payer: COMMERCIAL

## 2021-08-02 DIAGNOSIS — Z23 NEED FOR VACCINATION: Primary | ICD-10-CM

## 2021-08-02 PROCEDURE — 99207 PR NO CHARGE NURSE ONLY: CPT

## 2021-08-02 PROCEDURE — 90715 TDAP VACCINE 7 YRS/> IM: CPT

## 2021-08-02 PROCEDURE — 90471 IMMUNIZATION ADMIN: CPT

## 2021-08-03 PROBLEM — E66.01 CLASS 3 SEVERE OBESITY DUE TO EXCESS CALORIES WITH SERIOUS COMORBIDITY AND BODY MASS INDEX (BMI) OF 40.0 TO 44.9 IN ADULT (H): Status: RESOLVED | Noted: 2019-09-16 | Resolved: 2020-10-21

## 2021-08-03 PROBLEM — E66.813 CLASS 3 SEVERE OBESITY DUE TO EXCESS CALORIES WITH SERIOUS COMORBIDITY AND BODY MASS INDEX (BMI) OF 40.0 TO 44.9 IN ADULT (H): Status: RESOLVED | Noted: 2019-09-16 | Resolved: 2020-10-21

## 2021-09-26 ENCOUNTER — HEALTH MAINTENANCE LETTER (OUTPATIENT)
Age: 46
End: 2021-09-26

## 2021-11-08 ENCOUNTER — APPOINTMENT (OUTPATIENT)
Dept: FAMILY MEDICINE | Facility: CLINIC | Age: 46
End: 2021-11-08
Payer: COMMERCIAL

## 2021-11-08 ENCOUNTER — LAB REQUISITION (OUTPATIENT)
Dept: LAB | Facility: CLINIC | Age: 46
End: 2021-11-08

## 2021-11-08 LAB — SARS-COV-2 RNA RESP QL NAA+PROBE: POSITIVE

## 2021-11-08 PROCEDURE — U0005 INFEC AGEN DETEC AMPLI PROBE: HCPCS | Performed by: INTERNAL MEDICINE

## 2021-11-21 ENCOUNTER — HEALTH MAINTENANCE LETTER (OUTPATIENT)
Age: 46
End: 2021-11-21

## 2022-04-03 DIAGNOSIS — I10 ESSENTIAL HYPERTENSION: Primary | ICD-10-CM

## 2022-04-04 NOTE — TELEPHONE ENCOUNTER
"Routing refill request to provider for review/approval because:  Early refill request  Patient due for office visit/blood pressure check within 1 month    Last Written Prescription Date:  4/26/2021  Last Fill Quantity: 90,  # refills: 3   Last office visit provider:  4/26/2021 Dr. Mireles     olmesartan-hydrochlorothiazide (BENICAR HCT) 40-25 mg per tablet 90 tablet 3 4/26/2021  No   Sig - Route: Take 1 tablet by mouth daily. - Oral   Sent to pharmacy as: olmesartan 40 mg-hydrochlorothiazide 25 mg tablet (Benicar HCT)   E-Prescribing Status: Receipt confirmed by pharmacy (4/26/2021  9:20 AM CD       Requested Prescriptions   Pending Prescriptions Disp Refills     olmesartan-hydrochlorothiazide (BENICAR HCT) 40-25 MG tablet [Pharmacy Med Name: OLMESARTAN MEDOX/HCTZ 40-25MG TAB] 90 tablet      Sig: TAKE 1 TABLET BY MOUTH DAILY       Angiotensin-II Receptors Passed - 4/3/2022  3:25 AM        Passed - Last blood pressure under 140/90 in past 12 months     BP Readings from Last 3 Encounters:   04/26/21 120/80   10/21/20 126/80   01/20/20 (!) 161/82                 Passed - Recent (12 mo) or future (30 days) visit within the authorizing provider's specialty     Patient has had an office visit with the authorizing provider or a provider within the authorizing providers department within the previous 12 mos or has a future within next 30 days. See \"Patient Info\" tab in inbasket, or \"Choose Columns\" in Meds & Orders section of the refill encounter.              Passed - Medication is active on med list        Passed - Patient is age 18 or older        Passed - Normal serum creatinine on file in past 12 months     Recent Labs   Lab Test 07/19/21  0906   CR 1.14       Ok to refill medication if creatinine is low          Passed - Normal serum potassium on file in past 12 months     Recent Labs   Lab Test 07/19/21  0906   POTASSIUM 4.0                         Sulema Fernandes RN 04/04/22 2:54 PM  "

## 2022-04-05 RX ORDER — OLMESARTAN MEDOXOMIL AND HYDROCHLOROTHIAZIDE 40/25 40; 25 MG/1; MG/1
1 TABLET ORAL DAILY
Qty: 90 TABLET | Refills: 1 | Status: SHIPPED | OUTPATIENT
Start: 2022-04-05 | End: 2022-08-09

## 2022-04-14 ENCOUNTER — APPOINTMENT (OUTPATIENT)
Dept: FAMILY MEDICINE | Facility: CLINIC | Age: 47
End: 2022-04-14
Payer: COMMERCIAL

## 2022-04-14 ENCOUNTER — LAB REQUISITION (OUTPATIENT)
Dept: LAB | Facility: CLINIC | Age: 47
End: 2022-04-14

## 2022-04-14 LAB — SARS-COV-2 RNA RESP QL NAA+PROBE: NEGATIVE

## 2022-04-14 PROCEDURE — U0005 INFEC AGEN DETEC AMPLI PROBE: HCPCS | Performed by: INTERNAL MEDICINE

## 2022-04-29 ENCOUNTER — OFFICE VISIT (OUTPATIENT)
Dept: FAMILY MEDICINE | Facility: CLINIC | Age: 47
End: 2022-04-29
Payer: COMMERCIAL

## 2022-04-29 VITALS
WEIGHT: 282.13 LBS | SYSTOLIC BLOOD PRESSURE: 114 MMHG | HEART RATE: 72 BPM | HEIGHT: 69 IN | RESPIRATION RATE: 16 BRPM | TEMPERATURE: 98.4 F | DIASTOLIC BLOOD PRESSURE: 76 MMHG | BODY MASS INDEX: 41.79 KG/M2

## 2022-04-29 DIAGNOSIS — Z12.11 SCREEN FOR COLON CANCER: ICD-10-CM

## 2022-04-29 DIAGNOSIS — E78.00 HYPERCHOLESTEREMIA: ICD-10-CM

## 2022-04-29 DIAGNOSIS — K76.0 NAFLD (NONALCOHOLIC FATTY LIVER DISEASE): ICD-10-CM

## 2022-04-29 DIAGNOSIS — E66.01 CLASS 3 SEVERE OBESITY DUE TO EXCESS CALORIES WITH SERIOUS COMORBIDITY AND BODY MASS INDEX (BMI) OF 40.0 TO 44.9 IN ADULT (H): ICD-10-CM

## 2022-04-29 DIAGNOSIS — Z11.59 ENCOUNTER FOR HEPATITIS C SCREENING TEST FOR LOW RISK PATIENT: ICD-10-CM

## 2022-04-29 DIAGNOSIS — I10 ESSENTIAL HYPERTENSION: ICD-10-CM

## 2022-04-29 DIAGNOSIS — Z82.49 FAMILY HISTORY OF HEART DISEASE IN MALE FAMILY MEMBER BEFORE AGE 55: ICD-10-CM

## 2022-04-29 DIAGNOSIS — E88.810 METABOLIC SYNDROME X: ICD-10-CM

## 2022-04-29 DIAGNOSIS — R73.03 PREDIABETES: ICD-10-CM

## 2022-04-29 DIAGNOSIS — G47.33 OSA ON CPAP: ICD-10-CM

## 2022-04-29 DIAGNOSIS — R53.83 FATIGUE, UNSPECIFIED TYPE: ICD-10-CM

## 2022-04-29 DIAGNOSIS — K21.9 GASTROESOPHAGEAL REFLUX DISEASE, UNSPECIFIED WHETHER ESOPHAGITIS PRESENT: ICD-10-CM

## 2022-04-29 DIAGNOSIS — Z00.00 ROUTINE GENERAL MEDICAL EXAMINATION AT A HEALTH CARE FACILITY: Primary | ICD-10-CM

## 2022-04-29 DIAGNOSIS — E66.813 CLASS 3 SEVERE OBESITY DUE TO EXCESS CALORIES WITH SERIOUS COMORBIDITY AND BODY MASS INDEX (BMI) OF 40.0 TO 44.9 IN ADULT (H): ICD-10-CM

## 2022-04-29 DIAGNOSIS — N52.9 ERECTILE DYSFUNCTION, UNSPECIFIED ERECTILE DYSFUNCTION TYPE: ICD-10-CM

## 2022-04-29 PROBLEM — Z78.9 STATIN INTOLERANCE: Status: ACTIVE | Noted: 2020-10-21

## 2022-04-29 PROBLEM — R73.01 ELEVATED FASTING GLUCOSE: Status: ACTIVE | Noted: 2018-11-01

## 2022-04-29 LAB
ALBUMIN SERPL-MCNC: 4.3 G/DL (ref 3.5–5)
ALP SERPL-CCNC: 60 U/L (ref 45–120)
ALT SERPL W P-5'-P-CCNC: 139 U/L (ref 0–45)
ANION GAP SERPL CALCULATED.3IONS-SCNC: 15 MMOL/L (ref 5–18)
AST SERPL W P-5'-P-CCNC: 60 U/L (ref 0–40)
BILIRUB SERPL-MCNC: 1 MG/DL (ref 0–1)
BUN SERPL-MCNC: 29 MG/DL (ref 8–22)
CALCIUM SERPL-MCNC: 9.9 MG/DL (ref 8.5–10.5)
CHLORIDE BLD-SCNC: 101 MMOL/L (ref 98–107)
CHOLEST SERPL-MCNC: 239 MG/DL
CK SERPL-CCNC: 174 U/L (ref 30–190)
CO2 SERPL-SCNC: 25 MMOL/L (ref 22–31)
CREAT SERPL-MCNC: 1.51 MG/DL (ref 0.7–1.3)
FASTING STATUS PATIENT QL REPORTED: YES
GFR SERPL CREATININE-BSD FRML MDRD: 57 ML/MIN/1.73M2
GLUCOSE BLD-MCNC: 103 MG/DL (ref 70–125)
HBA1C MFR BLD: 5.5 %
HDLC SERPL-MCNC: 36 MG/DL
LDLC SERPL CALC-MCNC: 171 MG/DL
POTASSIUM BLD-SCNC: 4.1 MMOL/L (ref 3.5–5)
PROT SERPL-MCNC: 7.8 G/DL (ref 6–8)
SODIUM SERPL-SCNC: 141 MMOL/L (ref 136–145)
TRIGL SERPL-MCNC: 158 MG/DL
TSH SERPL DL<=0.005 MIU/L-ACNC: 1.86 UIU/ML (ref 0.3–5)

## 2022-04-29 PROCEDURE — 83036 HEMOGLOBIN GLYCOSYLATED A1C: CPT | Performed by: FAMILY MEDICINE

## 2022-04-29 PROCEDURE — 84443 ASSAY THYROID STIM HORMONE: CPT | Performed by: FAMILY MEDICINE

## 2022-04-29 PROCEDURE — 82550 ASSAY OF CK (CPK): CPT | Performed by: FAMILY MEDICINE

## 2022-04-29 PROCEDURE — 99396 PREV VISIT EST AGE 40-64: CPT | Performed by: FAMILY MEDICINE

## 2022-04-29 PROCEDURE — 36415 COLL VENOUS BLD VENIPUNCTURE: CPT | Performed by: FAMILY MEDICINE

## 2022-04-29 PROCEDURE — 80061 LIPID PANEL: CPT | Performed by: FAMILY MEDICINE

## 2022-04-29 PROCEDURE — 99214 OFFICE O/P EST MOD 30 MIN: CPT | Mod: 25 | Performed by: FAMILY MEDICINE

## 2022-04-29 PROCEDURE — 86803 HEPATITIS C AB TEST: CPT | Performed by: FAMILY MEDICINE

## 2022-04-29 PROCEDURE — 80053 COMPREHEN METABOLIC PANEL: CPT | Performed by: FAMILY MEDICINE

## 2022-04-29 RX ORDER — TADALAFIL 5 MG/1
TABLET ORAL
Qty: 90 TABLET | Refills: 3 | Status: SHIPPED | OUTPATIENT
Start: 2022-04-29 | End: 2023-06-05

## 2022-04-29 RX ORDER — TADALAFIL 5 MG/1
TABLET ORAL
COMMUNITY
Start: 2021-10-13 | End: 2022-04-29

## 2022-04-29 ASSESSMENT — ENCOUNTER SYMPTOMS
SORE THROAT: 0
COUGH: 0
JOINT SWELLING: 0
PALPITATIONS: 0
ABDOMINAL PAIN: 0
EYE PAIN: 0
MYALGIAS: 0
CHILLS: 0
ARTHRALGIAS: 0
PARESTHESIAS: 0
DIZZINESS: 0
SHORTNESS OF BREATH: 0
HEMATURIA: 0
FATIGUE: 1
DYSURIA: 0
HEARTBURN: 0
FREQUENCY: 0
CONSTIPATION: 0
NERVOUS/ANXIOUS: 0
WEAKNESS: 0
HEMATOCHEZIA: 0
DIARRHEA: 0
FEVER: 0
NAUSEA: 0
HEADACHES: 0

## 2022-04-29 NOTE — ASSESSMENT & PLAN NOTE
We will begin to focus on medical weight loss using semaglutide prescribed for prediabetes.  Discussed time restricted eating as well.  Fasting lab work today.  Follow-up 4 to 6 weeks after starting new medication.  No contraindication to phentermine although we would have to check his blood pressure quite closely.

## 2022-04-29 NOTE — PROGRESS NOTES
"SUBJECTIVE:   CC: Yifan Quesada is an 46 year old male who presents for preventative health visit.     Patient has been advised of split billing requirements and indicates understanding: Yes     Chief Complaint   Patient presents with     Physical     Pt. Fasting.     Medication Update     Gastrophageal Reflux     Fatigue     Additional concerns   - left chest wall pain.  \"Around the left nipple.\"  No palpable abnormality.  \"A little inflamed.\" no nipple discharge.    GERD:   - back injury last fall. \"Dead lifts.\"  He is a participant in a back pain study. \"Randomized to medical side.\"  He took gabapentin and ibuprofen for a couple of months.     - He had covid in November.  \"I have been wiped out ever since.\"  Low stamina.  Muscles get tired and fatigued quickly. He reports that this feels like myalgias.  He was on pepcid at the time and saw that it has a side effect of myalgias.     Healthy Habits:     Getting at least 3 servings of Calcium per day:  Yes    Bi-annual eye exam:  NO    Dental care twice a year:  NO    Sleep apnea or symptoms of sleep apnea:  Sleep apnea    Diet:  Low salt and Gluten-free/reduced    Frequency of exercise:  2-3 days/week    Duration of exercise:  Other    Taking medications regularly:  Yes    Medication side effects:  None    PHQ-2 Total Score: 0    Additional concerns today:  Yes  Fatigue  Associated symptoms include fatigue. Pertinent negatives include no abdominal pain, arthralgias, chest pain, chills, congestion, coughing, fever, headaches, joint swelling, myalgias, nausea, rash, sore throat or weakness.     Today's PHQ-2 Score:   PHQ-2 ( 1999 Pfizer) 4/29/2022   Q1: Little interest or pleasure in doing things 0   Q2: Feeling down, depressed or hopeless 0   PHQ-2 Score 0   PHQ-2 Total Score (12-17 Years)- Positive if 3 or more points; Administer PHQ-A if positive -   Q1: Little interest or pleasure in doing things -   Q2: Feeling down, depressed or hopeless -   PHQ-2 Score - "     Abuse: Current or Past(Physical, Sexual or Emotional)- No  Do you feel safe in your environment? Yes    Social History     Tobacco Use     Smoking status: Former Smoker     Packs/day: 0.00     Years: 0.00     Pack years: 0.00     Types: Clove cigarettes or kreteks     Start date: 1994     Quit date: 1995     Years since quittin.0     Smokeless tobacco: Never Used     Tobacco comment: in college   Substance Use Topics     Alcohol use: Yes     Comment: Alcoholic Drinks/day: 2 drinks per month      Alcohol Use 2022   Prescreen: >3 drinks/day or >7 drinks/week? No       Last PSA: No results found for: PSA    Reviewed orders with patient. Reviewed health maintenance and updated orders accordingly - Yes    Reviewed and updated as needed this visit by clinical staff   Tobacco  Allergies  Meds  Problems  Med Hx  Surg Hx  Fam Hx            Reviewed and updated as needed this visit by Provider   Tobacco  Allergies  Meds  Problems  Med Hx  Surg Hx  Fam Hx             Review of Systems   Constitutional: Positive for fatigue. Negative for chills and fever.   HENT: Negative for congestion, ear pain, hearing loss and sore throat.    Eyes: Negative for pain and visual disturbance.   Respiratory: Negative for cough and shortness of breath.    Cardiovascular: Negative for chest pain, palpitations and peripheral edema.   Gastrointestinal: Negative for abdominal pain, constipation, diarrhea, heartburn, hematochezia and nausea.   Genitourinary: Negative for dysuria, frequency, genital sores, hematuria, impotence, penile discharge and urgency.   Musculoskeletal: Negative for arthralgias, joint swelling and myalgias.   Skin: Negative for rash.   Neurological: Negative for dizziness, weakness, headaches and paresthesias.   Psychiatric/Behavioral: Negative for mood changes. The patient is not nervous/anxious.      OBJECTIVE:   /76 (BP Location: Left arm, Patient Position: Sitting, Cuff Size: Adult  "Large)   Pulse 72   Temp 98.4  F (36.9  C) (Oral)   Resp 16   Ht 1.753 m (5' 9\")   Wt 128 kg (282 lb 2 oz)   BMI 41.66 kg/m      Physical Exam  Vitals reviewed.   Constitutional:       General: He is not in acute distress.     Appearance: Normal appearance.   HENT:      Head: Normocephalic and atraumatic.      Right Ear: External ear normal.      Left Ear: External ear normal.      Nose: Nose normal.      Mouth/Throat:      Pharynx: No oropharyngeal exudate or posterior oropharyngeal erythema.   Eyes:      General: No scleral icterus.  Cardiovascular:      Rate and Rhythm: Normal rate and regular rhythm.      Heart sounds: Normal heart sounds. No murmur heard.    No friction rub. No gallop.   Pulmonary:      Effort: Pulmonary effort is normal. No respiratory distress.      Breath sounds: No wheezing.   Abdominal:      General: Bowel sounds are normal. There is no distension.      Palpations: Abdomen is soft. There is no mass.      Tenderness: There is no abdominal tenderness.   Musculoskeletal:         General: No swelling. Normal range of motion.      Cervical back: Normal range of motion.   Skin:     Findings: No rash.   Neurological:      General: No focal deficit present.      Mental Status: He is alert and oriented to person, place, and time.      Cranial Nerves: No cranial nerve deficit.      Deep Tendon Reflexes: Reflexes normal.   Psychiatric:         Mood and Affect: Mood normal.         Behavior: Behavior normal.         Thought Content: Thought content normal.         Judgment: Judgment normal.         Diagnostic Test Results:  Labs reviewed in Epic    ASSESSMENT/PLAN:     Prediabetes  This patient continues to struggle with metabolic syndrome, NAFLD the, prediabetes.  Energy is poor.  Discussed nutrition change as we have in the past.  We also discussed whether or not he might benefit from pharmaceutical therapy.  Given his cluster of diagnoses, I think a GLP-1 receptor agonist is appropriate.  I " prescribed semaglutide (Ozempic) as a starting point.  I believe that his insurance would cover Wegovy as well.  We will titrate using Ozempic.  If he does well, consider either going up to the 2.0 mg/week dosing of Ozempic (when available) or switch to Wegovy.  He will work to restrict carbohydrates and add protein.  Fasting lab work today.  Hemoglobin A1c today.    Routine general medical examination at a health care facility  Annual exam.  Fatigue since having had COVID-19 approximately 5 to 6 months ago.  On physical exam, he has tenderness to the left anterior chest wall in a position of 4:00 in relation to the nipple.  This seems to correspond to his pectoralis major muscle although it is more prominent on the left side than the right side.  If discomfort continues, we will evaluate using mammography for breast cancer.  Fasting lab work today.  Given poor energy we will check TSH in addition to annual fasting labs.  He will be screened for hepatitis C.  Colonoscopy referral placed.    Essential hypertension  BP well controlled.  Continue current therapy.  Check basic metabolic panel.    Erectile dysfunction  Doing well on tadalafil.  No side effects.  Refill sent to pharmacy.    Class 3 severe obesity due to excess calories with serious comorbidity and body mass index (BMI) of 40.0 to 44.9 in adult (H)  We will begin to focus on medical weight loss using semaglutide prescribed for prediabetes.  Discussed time restricted eating as well.  Fasting lab work today.  Follow-up 4 to 6 weeks after starting new medication.  No contraindication to phentermine although we would have to check his blood pressure quite closely.    Fatigue, unspecified type  Post COVID syndrome?      Patient has been advised of split billing requirements and indicates understanding: Yes    COUNSELING:   Reviewed preventive health counseling, as reflected in patient instructions       Regular exercise       Healthy diet/nutrition        "Consider Hep C screening for all patients one time for ages 18-79 years       HIV screeninx in teen years, 1x in adult years, and at intervals if high risk       Colorectal cancer screening       Prostate cancer screening    Estimated body mass index is 41.66 kg/m  as calculated from the following:    Height as of this encounter: 1.753 m (5' 9\").    Weight as of this encounter: 128 kg (282 lb 2 oz).     Weight management plan: Discussed healthy diet and exercise guidelines    He reports that he quit smoking about 27 years ago. His smoking use included clove cigarettes or kreteks. He started smoking about 27 years ago. He smoked 0.00 packs per day for 0.00 years. He has never used smokeless tobacco.    Counseling Resources:  ATP IV Guidelines  Pooled Cohorts Equation Calculator  FRAX Risk Assessment  ICSI Preventive Guidelines  Dietary Guidelines for Americans, 2010  USDA's MyPlate  ASA Prophylaxis  Lung CA Screening    Devang Sepulveda MD  [facility]  "

## 2022-04-29 NOTE — ASSESSMENT & PLAN NOTE
This patient continues to struggle with metabolic syndrome, NAFLD the, prediabetes.  Energy is poor.  Discussed nutrition change as we have in the past.  We also discussed whether or not he might benefit from pharmaceutical therapy.  Given his cluster of diagnoses, I think a GLP-1 receptor agonist is appropriate.  I prescribed semaglutide (Ozempic) as a starting point.  I believe that his insurance would cover Wegovy as well.  We will titrate using Ozempic.  If he does well, consider either going up to the 2.0 mg/week dosing of Ozempic (when available) or switch to Wegovy.  He will work to restrict carbohydrates and add protein.  Fasting lab work today.  Hemoglobin A1c today.

## 2022-04-29 NOTE — ASSESSMENT & PLAN NOTE
Annual exam.  Fatigue since having had COVID-19 approximately 5 to 6 months ago.  On physical exam, he has tenderness to the left anterior chest wall in a position of 4:00 in relation to the nipple.  This seems to correspond to his pectoralis major muscle although it is more prominent on the left side than the right side.  If discomfort continues, we will evaluate using mammography for breast cancer.  Fasting lab work today.  Given poor energy we will check TSH in addition to annual fasting labs.  He will be screened for hepatitis C.  Colonoscopy referral placed.

## 2022-05-02 LAB — HCV AB SERPL QL IA: NONREACTIVE

## 2022-05-27 ENCOUNTER — VIRTUAL VISIT (OUTPATIENT)
Dept: FAMILY MEDICINE | Facility: CLINIC | Age: 47
End: 2022-05-27
Payer: COMMERCIAL

## 2022-05-27 DIAGNOSIS — E66.813 CLASS 3 SEVERE OBESITY DUE TO EXCESS CALORIES WITH SERIOUS COMORBIDITY AND BODY MASS INDEX (BMI) OF 40.0 TO 44.9 IN ADULT (H): ICD-10-CM

## 2022-05-27 DIAGNOSIS — K76.0 NAFLD (NONALCOHOLIC FATTY LIVER DISEASE): ICD-10-CM

## 2022-05-27 DIAGNOSIS — E66.01 CLASS 3 SEVERE OBESITY DUE TO EXCESS CALORIES WITH SERIOUS COMORBIDITY AND BODY MASS INDEX (BMI) OF 40.0 TO 44.9 IN ADULT (H): ICD-10-CM

## 2022-05-27 DIAGNOSIS — N18.31 STAGE 3A CHRONIC KIDNEY DISEASE (H): ICD-10-CM

## 2022-05-27 DIAGNOSIS — R73.03 PREDIABETES: ICD-10-CM

## 2022-05-27 DIAGNOSIS — R53.83 FATIGUE, UNSPECIFIED TYPE: ICD-10-CM

## 2022-05-27 DIAGNOSIS — E88.810 METABOLIC SYNDROME X: ICD-10-CM

## 2022-05-27 DIAGNOSIS — Z12.11 SCREEN FOR COLON CANCER: Primary | ICD-10-CM

## 2022-05-27 PROCEDURE — 99213 OFFICE O/P EST LOW 20 MIN: CPT | Mod: 95 | Performed by: FAMILY MEDICINE

## 2022-05-27 ASSESSMENT — ENCOUNTER SYMPTOMS: CONSTITUTIONAL NEGATIVE: 1

## 2022-05-27 NOTE — PROGRESS NOTES
Type of service:  Video Visit    Yifan Quesada is a 46 year old male who is being evaluated via a billable video visit.      How would you like to obtain your AVS? MyChart  If dropped from the video visit, the video invitation should be resent by: Text to cell phone: [phone]  Will anyone else be joining your video visit? No    Video Start Time: 9:16 AM  Video End Time (time video stopped): 9:27 AM  Originating Location (pt. Location): Home  Distant Location (provider location):  [facility]   Platform used for Video Visit: Pigit      Problem List Items Addressed This Visit     Class 3 severe obesity due to excess calories with serious comorbidity and body mass index (BMI) of 40.0 to 44.9 in adult (H)     Weight improved.             Relevant Medications    Semaglutide, 1 MG/DOSE, 4 MG/3ML SOPN    Fatigue, unspecified type     Slow improvement.  Continue to monitor.             Metabolic syndrome X    Relevant Medications    Semaglutide, 1 MG/DOSE, 4 MG/3ML SOPN    NAFLD (nonalcoholic fatty liver disease)     Doing well with fasting.  He has tolerated semaglutide.  We will titrate to 1.0mg/week.  Return to clinic to check liver labs in about 2 months.           Relevant Orders    Hepatic panel (Albumin, ALT, AST, Bili, Alk Phos, TP)    Prediabetes    Relevant Medications    Semaglutide, 1 MG/DOSE, 4 MG/3ML SOPN    Stage 3a chronic kidney disease (H)     Mild.  Continue to follow-up             Other Visit Diagnoses     Screen for colon cancer    -  Primary         Follow-up Visit   Expected date:  Jul 27, 2022 (Approximate)      Follow Up Appointment Details:     Follow-up with whom?: Me    Follow-Up for what?: Chronic Disease f/u    Chronic Disease f/u: General (Other)    Additional Details: Metabolic health (prediabetes, metabolic syndrome)    How?: In Person    Is this an as-needed follow-up?: No    Authorizing Provider: Devang Sepulveda MD Subjective Mark  "is a 46 year old who presents for the following health issues   Chief Complaint   Patient presents with     Medication Update     Follow UP      Fatigue:   - lingering COVID symptoms are getting better.  Able to exercise a couple of times in the past week.  WAs able to enjoy playing kick ball.    - weight: losing weight.  Down ~15 lbs.  BEAR: \"It's fine.\" Struggle to structure schedule. Generally he does 16:8 schedule.     History of Present Illness       Reason for visit:  Check in on long COVID and metabolic syndrome.    He eats 4 or more servings of fruits and vegetables daily.He consumes 0 sweetened beverage(s) daily.He exercises with enough effort to increase his heart rate 30 to 60 minutes per day.  He exercises with enough effort to increase his heart rate 3 or less days per week.   He is taking medications regularly.     Review of Systems   Constitutional: Negative.    All other systems reviewed and are negative.           Objective           Vitals:  No vitals were obtained today due to virtual visit.    Physical Exam  Nursing note reviewed.   Constitutional:       General: He is not in acute distress.     Appearance: Normal appearance. He is not ill-appearing.   HENT:      Head: Normocephalic and atraumatic.   Eyes:      Extraocular Movements: Extraocular movements intact.      Conjunctiva/sclera: Conjunctivae normal.   Pulmonary:      Effort: Pulmonary effort is normal.   Neurological:      Mental Status: He is alert and oriented to person, place, and time.   Psychiatric:         Attention and Perception: Attention normal.         Mood and Affect: Mood normal.         Speech: Speech normal.         Thought Content: Thought content normal.                This note has been dictated using voice recognition software. Any grammatical or context distortions are unintentional and inherent to the software        "

## 2022-05-27 NOTE — ASSESSMENT & PLAN NOTE
Doing well with fasting.  He has tolerated semaglutide.  We will titrate to 1.0mg/week.  Return to clinic to check liver labs in about 2 months.

## 2022-06-02 ENCOUNTER — TRANSFERRED RECORDS (OUTPATIENT)
Dept: HEALTH INFORMATION MANAGEMENT | Facility: CLINIC | Age: 47
End: 2022-06-02
Payer: COMMERCIAL

## 2022-06-02 DIAGNOSIS — R19.7 DIARRHEA OF PRESUMED INFECTIOUS ORIGIN: ICD-10-CM

## 2022-07-14 LAB — C DIFF TOX B STL QL: NEGATIVE

## 2022-07-14 PROCEDURE — 87329 GIARDIA AG IA: CPT | Performed by: FAMILY MEDICINE

## 2022-07-14 PROCEDURE — 87506 IADNA-DNA/RNA PROBE TQ 6-11: CPT | Performed by: FAMILY MEDICINE

## 2022-07-14 PROCEDURE — 87328 CRYPTOSPORIDIUM AG IA: CPT | Performed by: FAMILY MEDICINE

## 2022-07-14 PROCEDURE — 87209 SMEAR COMPLEX STAIN: CPT | Performed by: FAMILY MEDICINE

## 2022-07-14 PROCEDURE — 87493 C DIFF AMPLIFIED PROBE: CPT | Performed by: FAMILY MEDICINE

## 2022-07-15 LAB
C COLI+JEJUNI+LARI FUSA STL QL NAA+PROBE: NOT DETECTED
C PARVUM AG STL QL IA: NEGATIVE
EC STX1 GENE STL QL NAA+PROBE: NOT DETECTED
EC STX2 GENE STL QL NAA+PROBE: NOT DETECTED
G LAMBLIA AG STL QL IA: NEGATIVE
NOROV GI+II ORF1-ORF2 JNC STL QL NAA+PR: NOT DETECTED
O+P STL MICRO: NEGATIVE
RVA NSP5 STL QL NAA+PROBE: NOT DETECTED
SALMONELLA SP RPOD STL QL NAA+PROBE: NOT DETECTED
SHIGELLA SP+EIEC IPAH STL QL NAA+PROBE: NOT DETECTED
TRI STN SPEC: NORMAL
V CHOL+PARA RFBL+TRKH+TNAA STL QL NAA+PR: NOT DETECTED
Y ENTERO RECN STL QL NAA+PROBE: NOT DETECTED

## 2022-07-19 DIAGNOSIS — E88.810 METABOLIC SYNDROME X: ICD-10-CM

## 2022-07-19 DIAGNOSIS — R73.03 PREDIABETES: ICD-10-CM

## 2022-07-19 RX ORDER — SEMAGLUTIDE 1.34 MG/ML
INJECTION, SOLUTION SUBCUTANEOUS
Qty: 9 ML | Refills: 1 | OUTPATIENT
Start: 2022-07-19

## 2022-07-19 NOTE — TELEPHONE ENCOUNTER
"Routing refill request to provider for review/approval because:  Labs out of range:  Creatinine    Last Written Prescription Date:  5/27/22  Last Fill Quantity: 3 ml,  # refills: 1   Last office visit provider:  5/27/22     Requested Prescriptions   Pending Prescriptions Disp Refills     OZEMPIC (1 MG/DOSE) 4 MG/3ML SOPN [Pharmacy Med Name: OZEMPIC 1MG PER DOSE (1X4MG PEN)] 3 mL 1     Sig: INJECT 1MG SUBCUTANEOUSLY ONCE PER WEEK.       GLP-1 Agonists Protocol Failed - 7/19/2022 12:43 PM        Failed - Normal serum creatinine on file in past 12 months     Recent Labs   Lab Test 04/29/22  1109   CR 1.51*       Ok to refill medication if creatinine is low          Passed - HgbA1C in past 3 or 6 months     If HgbA1C is 8 or greater, it needs to be on file within the past 3 months.  If less than 8, must be on file within the past 6 months.     Recent Labs   Lab Test 04/29/22  1109   A1C 5.5             Passed - Medication is active on med list        Passed - Patient is age 18 or older        Passed - Recent (6 mo) or future (30 days) visit within the authorizing provider's specialty     Patient had office visit in the last 6 months or has a visit in the next 30 days with authorizing provider.  See \"Patient Info\" tab in inbasket, or \"Choose Columns\" in Meds & Orders section of the refill encounter.                 Yifan Fonseca RN 07/19/22 12:44 PM  "

## 2022-08-09 ENCOUNTER — OFFICE VISIT (OUTPATIENT)
Dept: FAMILY MEDICINE | Facility: CLINIC | Age: 47
End: 2022-08-09
Attending: FAMILY MEDICINE
Payer: COMMERCIAL

## 2022-08-09 VITALS
HEART RATE: 78 BPM | SYSTOLIC BLOOD PRESSURE: 124 MMHG | HEIGHT: 69 IN | BODY MASS INDEX: 39.21 KG/M2 | DIASTOLIC BLOOD PRESSURE: 80 MMHG | OXYGEN SATURATION: 96 % | WEIGHT: 264.7 LBS

## 2022-08-09 DIAGNOSIS — K21.9 GASTROESOPHAGEAL REFLUX DISEASE, UNSPECIFIED WHETHER ESOPHAGITIS PRESENT: ICD-10-CM

## 2022-08-09 DIAGNOSIS — E66.812 CLASS 2 SEVERE OBESITY DUE TO EXCESS CALORIES WITH SERIOUS COMORBIDITY AND BODY MASS INDEX (BMI) OF 39.0 TO 39.9 IN ADULT (H): Primary | ICD-10-CM

## 2022-08-09 DIAGNOSIS — K76.0 NAFLD (NONALCOHOLIC FATTY LIVER DISEASE): ICD-10-CM

## 2022-08-09 DIAGNOSIS — E66.01 CLASS 2 SEVERE OBESITY DUE TO EXCESS CALORIES WITH SERIOUS COMORBIDITY AND BODY MASS INDEX (BMI) OF 39.0 TO 39.9 IN ADULT (H): Primary | ICD-10-CM

## 2022-08-09 DIAGNOSIS — I10 ESSENTIAL HYPERTENSION: ICD-10-CM

## 2022-08-09 DIAGNOSIS — N18.31 STAGE 3A CHRONIC KIDNEY DISEASE (H): ICD-10-CM

## 2022-08-09 PROBLEM — K63.5 POLYP OF COLON: Status: ACTIVE | Noted: 2022-06-02

## 2022-08-09 LAB
ALBUMIN SERPL BCG-MCNC: 4.3 G/DL (ref 3.5–5.2)
ALP SERPL-CCNC: 54 U/L (ref 40–129)
ALT SERPL W P-5'-P-CCNC: 56 U/L (ref 10–50)
ANION GAP SERPL CALCULATED.3IONS-SCNC: 11 MMOL/L (ref 7–15)
AST SERPL W P-5'-P-CCNC: 29 U/L (ref 10–50)
BILIRUB DIRECT SERPL-MCNC: <0.2 MG/DL (ref 0–0.3)
BILIRUB SERPL-MCNC: 0.5 MG/DL
BUN SERPL-MCNC: 17.1 MG/DL (ref 6–20)
CALCIUM SERPL-MCNC: 9.3 MG/DL (ref 8.6–10)
CHLORIDE SERPL-SCNC: 104 MMOL/L (ref 98–107)
CREAT SERPL-MCNC: 1.25 MG/DL (ref 0.67–1.17)
DEPRECATED HCO3 PLAS-SCNC: 27 MMOL/L (ref 22–29)
GFR SERPL CREATININE-BSD FRML MDRD: 71 ML/MIN/1.73M2
GLUCOSE SERPL-MCNC: 103 MG/DL (ref 70–99)
HGB BLD-MCNC: 15.8 G/DL (ref 13.3–17.7)
POTASSIUM SERPL-SCNC: 4.1 MMOL/L (ref 3.4–5.3)
PROT SERPL-MCNC: 7.1 G/DL (ref 6.4–8.3)
SODIUM SERPL-SCNC: 142 MMOL/L (ref 136–145)

## 2022-08-09 PROCEDURE — 80053 COMPREHEN METABOLIC PANEL: CPT | Performed by: FAMILY MEDICINE

## 2022-08-09 PROCEDURE — 82248 BILIRUBIN DIRECT: CPT | Performed by: FAMILY MEDICINE

## 2022-08-09 PROCEDURE — 36415 COLL VENOUS BLD VENIPUNCTURE: CPT | Performed by: FAMILY MEDICINE

## 2022-08-09 PROCEDURE — 99214 OFFICE O/P EST MOD 30 MIN: CPT | Performed by: FAMILY MEDICINE

## 2022-08-09 PROCEDURE — 85018 HEMOGLOBIN: CPT | Performed by: FAMILY MEDICINE

## 2022-08-09 RX ORDER — OMEGA-3 FATTY ACIDS/FISH OIL 300-1000MG
1 CAPSULE ORAL EVERY 6 HOURS
COMMUNITY
Start: 2022-06-02

## 2022-08-09 RX ORDER — OLMESARTAN MEDOXOMIL AND HYDROCHLOROTHIAZIDE 40/25 40; 25 MG/1; MG/1
1 TABLET ORAL DAILY
Qty: 90 TABLET | Refills: 3 | Status: SHIPPED | OUTPATIENT
Start: 2022-08-09 | End: 2023-06-30

## 2022-08-09 ASSESSMENT — ENCOUNTER SYMPTOMS: CONSTITUTIONAL NEGATIVE: 1

## 2022-08-09 NOTE — PROGRESS NOTES
Problem List Items Addressed This Visit     Class 2 severe obesity due to excess calories with serious comorbidity and body mass index (BMI) of 39.0 to 39.9 in adult (H) - Primary     Weight improved.  Primarily through carbohydrate restriction.  He was intolerant of semaglutide (nausea, GI distress).  Anticipate improvement in metabolic labs.           Essential hypertension     Blood pressure control is great.  Check basic metabolic panel.  Refill of olmesartan/chlorothiazide sent to pharmacy.           Relevant Medications    olmesartan-hydrochlorothiazide (BENICAR HCT) 40-25 MG tablet    Other Relevant Orders    Comprehensive metabolic panel (BMP + Alb, Alk Phos, ALT, AST, Total. Bili, TP)    GERD (gastroesophageal reflux disease)    Relevant Medications    omeprazole (PRILOSEC) 20 MG DR capsule    NAFLD (nonalcoholic fatty liver disease)     Anticipate improvement of transaminases.  Unfortunately, he was not tolerant of the GLP-1 receptor agonist.  We could try an alternative medication at some point in the future.  I believe his current nutrition plan should result in improved liver labs.  Labs will be completed today.           Relevant Orders    Comprehensive metabolic panel (BMP + Alb, Alk Phos, ALT, AST, Total. Bili, TP)    Bilirubin direct    Stage 3a chronic kidney disease (H)    Relevant Orders    Comprehensive metabolic panel (BMP + Alb, Alk Phos, ALT, AST, Total. Bili, TP)    Hemoglobin (Completed)          Subjective   Yifan is a 46 year old who presents for the following health issues   Chief Complaint   Patient presents with     Weight Loss     F/u     LFTs f/u      Patient presents for treatment of chronic, comorbid conditions using intensive therapeutic lifestyle interventions including nutrition, physical activity, and behavior management.   - successes: carb restricted.  Energy okay.  Lots of meat and veggies.    - struggles: appetite controlled   - exercise plan: active.     -  "tracking/journaling: ad samantha   - following nutritional plan: yes.  Deviations from plan: infrequent   - hunger: controlled.   - medication benefits: NA side effects: NA      History of Present Illness       Reason for visit:  Follow up on NAFLD    He eats 2-3 servings of fruits and vegetables daily.He consumes 0 sweetened beverage(s) daily.He exercises with enough effort to increase his heart rate 30 to 60 minutes per day.  He exercises with enough effort to increase his heart rate 3 or less days per week.   He is taking medications regularly.    Review of Systems   Constitutional: Negative.    All other systems reviewed and are negative.        Objective    /80 (BP Location: Left arm, Patient Position: Sitting, Cuff Size: Adult Large)   Pulse 78   Ht 1.753 m (5' 9\")   Wt 120.1 kg (264 lb 11.2 oz)   SpO2 96%   BMI 39.09 kg/m    Body mass index is 39.09 kg/m .  Physical Exam  Nursing note reviewed.   Constitutional:       General: He is not in acute distress.     Appearance: Normal appearance. He is not ill-appearing.   HENT:      Head: Normocephalic and atraumatic.   Eyes:      Extraocular Movements: Extraocular movements intact.      Conjunctiva/sclera: Conjunctivae normal.   Pulmonary:      Effort: Pulmonary effort is normal.   Neurological:      Mental Status: He is alert and oriented to person, place, and time.   Psychiatric:         Attention and Perception: Attention normal.         Mood and Affect: Mood normal.         Speech: Speech normal.         Thought Content: Thought content normal.             This note has been dictated using voice recognition software. Any grammatical or context distortions are unintentional and inherent to the software      "

## 2022-08-09 NOTE — ASSESSMENT & PLAN NOTE
Weight improved.  Primarily through carbohydrate restriction.  He was intolerant of semaglutide (nausea, GI distress).  Anticipate improvement in metabolic labs.

## 2022-08-09 NOTE — ASSESSMENT & PLAN NOTE
Anticipate improvement of transaminases.  Unfortunately, he was not tolerant of the GLP-1 receptor agonist.  We could try an alternative medication at some point in the future.  I believe his current nutrition plan should result in improved liver labs.  Labs will be completed today.

## 2022-09-27 DIAGNOSIS — I10 ESSENTIAL HYPERTENSION: ICD-10-CM

## 2022-09-27 RX ORDER — OLMESARTAN MEDOXOMIL AND HYDROCHLOROTHIAZIDE 40/25 40; 25 MG/1; MG/1
1 TABLET ORAL DAILY
Qty: 90 TABLET | Refills: 3 | OUTPATIENT
Start: 2022-09-27

## 2022-12-22 ENCOUNTER — E-VISIT (OUTPATIENT)
Dept: FAMILY MEDICINE | Facility: CLINIC | Age: 47
End: 2022-12-22
Payer: COMMERCIAL

## 2022-12-22 DIAGNOSIS — K21.9 GASTROESOPHAGEAL REFLUX DISEASE, UNSPECIFIED WHETHER ESOPHAGITIS PRESENT: ICD-10-CM

## 2022-12-22 DIAGNOSIS — M25.529 ELBOW PAIN, UNSPECIFIED LATERALITY: Primary | ICD-10-CM

## 2022-12-22 PROCEDURE — 99421 OL DIG E/M SVC 5-10 MIN: CPT | Performed by: FAMILY MEDICINE

## 2022-12-22 NOTE — PATIENT INSTRUCTIONS
Musculoskeletal concern such as elbow pain does require physical exam.  Knowing the availability in my clinic as well as our capabilities, it might make sense to start with orthopedics.  I went ahead and placed a referral to help get this process started.  Here in [place], the team at [facility] tends to do a great job.  I will direct the referral to a sports medicine physician named Dr. Sanchez.    Omeprazole refill sent to pharmacy.    Please reply/call with questions.    Devang Sepulveda MD    (The above note was created using dictation software.  Despite my best efforts, irregularities of text may appear in my messages.  Please excuse these errors.  The software will frequently confuse pronouns such as he, she, you, etc.)

## 2022-12-30 ENCOUNTER — NURSE TRIAGE (OUTPATIENT)
Dept: NURSING | Facility: CLINIC | Age: 47
End: 2022-12-30

## 2022-12-30 ENCOUNTER — E-VISIT (OUTPATIENT)
Dept: FAMILY MEDICINE | Facility: CLINIC | Age: 47
End: 2022-12-30

## 2022-12-30 ENCOUNTER — VIRTUAL VISIT (OUTPATIENT)
Dept: FAMILY MEDICINE | Facility: CLINIC | Age: 47
End: 2022-12-30
Payer: COMMERCIAL

## 2022-12-30 DIAGNOSIS — U07.1 INFECTION DUE TO 2019 NOVEL CORONAVIRUS: Primary | ICD-10-CM

## 2022-12-30 PROCEDURE — 99214 OFFICE O/P EST MOD 30 MIN: CPT | Mod: 95 | Performed by: FAMILY MEDICINE

## 2022-12-30 PROCEDURE — 99207 PR NO CHARGE LOS: CPT | Performed by: FAMILY MEDICINE

## 2022-12-30 ASSESSMENT — ENCOUNTER SYMPTOMS: CONSTITUTIONAL NEGATIVE: 1

## 2022-12-30 NOTE — PROGRESS NOTES
Yifan is a 47 year old who is being evaluated via a billable video visit.      How would you like to obtain your AVS? MyChart  If the video visit is dropped, the invitation should be resent by: Text to cell phone: [phone]  Will anyone else be joining your video visit? No    Video-Visit Details    Type of service:  Video Visit     Originating Location (pt. Location): Home  Distant Location (provider location):  On-site  Platform used for Video Visit: Skilljar    Problem List Items Addressed This Visit    None  Visit Diagnoses     Infection due to 2019 novel coronavirus    -  Primary: This patient meets criteria for paxlovid therapy.  He is high risk based on body habitus.  He also has a number of other secondary health concerns that are concerning for severe risk of COVID-19.  He is currently undecided whether or not he will treat himself with paxlovid.  He is relatively asymptomatic at this time.  He is within the 5-day window.  He has adequate kidney function and adequate total body weight in order to be on these medications.  We reviewed his medications.  The drug interaction between tadalafil was addressed.  He will simply hold the medicine through the duration of his treatment in a few days beyond.  He generally takes 5 mg daily.  He also has not taken the medicine for approximately 24 hours.  He does not take omeprazole on a regular basis and understands that it may be a little bit less effective.  Follow-up if not improving.    Relevant Medications    nirmatrelvir and ritonavir (PAXLOVID) therapy pack          Subjective   Yifan is a 47 year old who presents for the following health issues   Chief Complaint   Patient presents with     Covid 19 Testing     Home test POS today. Sx-congestion, runny nose, cough, bodyaches, headache. Chills started late Wednesday PM 12/28/22.      47-year-old man with history of obesity with approximately 44 hours of upper respiratory symptoms including congestion, runny nose.   Positive home COVID-19 test today.  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  He started quarantine.  He is interested in options as the last time he had COVID-19 he was symptomatic for 10+ days.  No shortness of breath.       Review of Systems   Constitutional: Negative.    All other systems reviewed and are negative.           Objective    Vitals - Patient Reported  Temperature (Patient Reported):  (Has not checked)      Vitals:  No vitals were obtained today due to virtual visit.    Physical Exam  Nursing note reviewed.   Constitutional:       General: He is not in acute distress.     Appearance: Normal appearance. He is not ill-appearing.   HENT:      Head: Normocephalic and atraumatic.   Eyes:      Extraocular Movements: Extraocular movements intact.      Conjunctiva/sclera: Conjunctivae normal.   Pulmonary:      Effort: Pulmonary effort is normal.   Neurological:      Mental Status: He is alert and oriented to person, place, and time.   Psychiatric:         Attention and Perception: Attention normal.         Mood and Affect: Mood normal.         Speech: Speech normal.         Thought Content: Thought content normal.             This note has been dictated using voice recognition software. Any grammatical or context distortions are unintentional and inherent to the software

## 2022-12-30 NOTE — PATIENT INSTRUCTIONS
"Yifan, based on weight you are a candidate for paxlovid.  These are prescribed in the context of a formal visit (generally done via video).  The number below is the number to call to help schedule this.  I know this is challenging given the weekend but this is the process of the health system has set forward.    Devang Mireles MD    Instructions for Patients  Your COVID-19 test was positive. This means you have the virus.     What treatments are available?  Over-the-counter medicines may help with your symptoms such as runny or stuffy nose, cough, chills, and fever. Talk to your care team about your options.     Some people are at high risk for severe illness (for example if you have a weak immune system, you're 65 or older, or you have certain medical problems). If your risk is high and your symptoms started in the last 5 to 7 days, we strongly recommend for you to get COVID treatment as soon as possible. Paxlovid and Molnupiravir are proven safe and effective, make you feel better faster, and prevent hospitalization and death.       How do I self-isolate?  You isolate when you have symptoms of COVID or a test shows you have COVID, even if you don t have symptoms.   If you DO have symptoms:  Stay home and away from others  For at least 5 days after your symptoms started, AND   You are fever free for 24 hours (with no medicine that reduces fever), AND  Your other symptoms are better.  Wear a mask for 10 full days any time you are around others.  If you DON T have symptoms:  Stay at home and away from others for at least 5 days after your positive test.  Wear a mask for 10 full days any time you are around others.    You can schedule an appointment to discuss COVID treatment by requesting an appointment on [employer] by selecting \"schedule COVID-19 Treatment\" or by calling [employer] ([phone]).    What are the symptoms of COVID-19?  Symptoms can include fever, cough, shortness of breath, chills, headache, " muscle pain sore throat, fatigue, runny or stuffy nose, and loss of taste and smell. Other less common symptoms include nausea, vomiting, or diarrhea (watery stools).    Know when to call 911. Emergency warning signs include:  Trouble breathing or shortness of breath  Pain or pressure in the chest that doesn't go away  Feeling confused like you haven't felt before, or not being able to wake up  Bluish-colored lips or face    How can I take care of myself?  Get lots of rest. Drink extra fluids (unless a doctor has told you not to).  Take Tylenol (acetaminophen) for fever or pain. If you have liver or kidney problems, ask your family doctor if it's okay to take Tylenol   Adults:   650 mg (two 325 mg pills or tablets) every 4 to 6 hours, or...   1,000 mg (two 500 mg pills or tablets) every 8 hours as needed.  Note: Don't take more than 3,000 mg in one day. Acetaminophen is found in many medicines (both prescribed and over the counter). Read all labels to be sure you don't take too much.  For children, check the Tylenol bottle for the right dose. The dose is based on the child's age or weight.  Take over the counter medicines for your symptoms as needed. Talk to your pharmacist.  If you have other health problems (like cancer, heart failure, an organ transplant, or severe kidney disease): Call your specialty clinic if you don't feel better in the next 2 days.    Where can I get more information?  [facility] COVID-19 Resource Hub: [email or web address]   CDC Quarantine & Isolation: https://www.cdc.gov/coronavirus/2019-ncov/your-health/quarantine-isolation.html   CDC - What to Do If You're Sick: https://www.cdc.gov/coronavirus/2019-ncov/if-you-are-sick/index.html

## 2022-12-30 NOTE — TELEPHONE ENCOUNTER
Nurse Triage SBAR    Is this a 2nd Level Triage? YES, LICENSED PRACTITIONER REVIEW IS REQUIRED    Situation: Patient calling with Covid positive .  Consent: not needed    Background: Covid positive today.  Chills on Wednesday night -      Assessment:   Sx began Thursday night.    Congestion, headache, runny nose, cough, body aches, mild sore throat  FEVER: Not checked - states doesn't feel warm.    WORST SX:  Congestion/headache/body aches  Denies:  SOB, Difficulty breathing, Denies chest pain/pressure/tightness  Pt states he is worse than yesterday as he felt fine during the day yesterday.   VACCINE:  Pt has had both vaccines and 1 booster.    Obese & Overweight per pt.  Denies heart and lung concerns.      Protocol Recommended Disposition:   Covid Consult - and routed HP message to PCP for further recommendations IF any.     Recommendation: Advised patient to make an appointment. Transferred to scheduling. . Reviewed concerning symptoms and when to call back.   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  Pt was able to get visit with provider for 4:30 today for anti-viral consult.      Routed to provider    Does the patient meet one of the following criteria for ADS visit consideration? 16+ years old, with an [facility] PCP     TIP  Providers, please consider if this condition is appropriate for management at one of our Acute and Diagnostic Services sites.     If patient is a good candidate, please use dotphrase <dot>triageresponse and select Refer to ADS to document.         Minerva Singh RN [facility] 12/30/2022 3:26 PM                              Reason for Disposition    [1] HIGH RISK for severe COVID complications (e.g., weak immune system, age > 64 years, obesity with BMI of 30 or higher, pregnant, chronic lung disease or other chronic medical condition) AND [2] COVID symptoms  (e.g., cough, fever)  (Exceptions: Already seen by PCP and no new or worsening symptoms.)    Additional Information    Negative: SEVERE difficulty breathing (e.g., struggling for each breath, speaks in single words)    Negative: Difficult to awaken or acting confused (e.g., disoriented, slurred speech)    Negative: Bluish (or gray) lips or face now    Negative: Shock suspected (e.g., cold/pale/clammy skin, too weak to stand, low BP, rapid pulse)    Negative: Sounds like a life-threatening emergency to the triager    Negative: [1] Diagnosed or suspected COVID-19 AND [2] symptoms lasting 3 or more weeks    Negative: [1] COVID-19 exposure AND [2] no symptoms    Negative: COVID-19 vaccine reaction suspected (e.g., fever, headache, muscle aches) occurring 1 to 3 days after getting vaccine    Negative: COVID-19 vaccine, questions about    Negative: [1] Lives with someone known to have influenza (flu test positive) AND [2] flu-like symptoms (e.g., cough, runny nose, sore throat, SOB; with or without fever)    Negative: [1] Adult with possible COVID-19 symptoms AND [2] triager concerned about severity of symptoms or other causes    Negative: COVID-19 and breastfeeding, questions about    Negative: SEVERE or constant chest pain or pressure  (Exception: Mild central chest pain, present only when coughing.)    Negative: MODERATE difficulty breathing (e.g., speaks in phrases, SOB even at rest, pulse 100-120)    Negative: Headache and stiff neck (can't touch chin to chest)    Negative: Oxygen level (e.g., pulse oximetry) 90 percent or lower    Negative: Chest pain or pressure  (Exception: MILD central chest pain, present only when coughing)    Negative: Patient sounds very sick or weak to the triager    Negative: Fever > 103 F (39.4 C)    Negative: [1] Fever > 101 F (38.3 C) AND [2] over 60 years of age    Negative: MILD difficulty breathing (e.g., minimal/no SOB at rest, SOB with walking, pulse <100)    Negative: [1] Fever >  100.0 F (37.8 C) AND [2] bedridden (e.g., nursing home patient, CVA, chronic illness, recovering from surgery)    Protocols used: CORONAVIRUS (COVID-19) DIAGNOSED OR [id number]

## 2023-02-22 ENCOUNTER — OFFICE VISIT (OUTPATIENT)
Dept: ORTHOPEDICS | Facility: CLINIC | Age: 48
End: 2023-02-22
Attending: EMERGENCY MEDICINE
Payer: OTHER MISCELLANEOUS

## 2023-02-22 ENCOUNTER — APPOINTMENT (OUTPATIENT)
Dept: GENERAL RADIOLOGY | Facility: CLINIC | Age: 48
End: 2023-02-22
Attending: EMERGENCY MEDICINE
Payer: OTHER MISCELLANEOUS

## 2023-02-22 ENCOUNTER — PRE VISIT (OUTPATIENT)
Dept: ORTHOPEDICS | Facility: CLINIC | Age: 48
End: 2023-02-22
Payer: COMMERCIAL

## 2023-02-22 ENCOUNTER — HOSPITAL ENCOUNTER (EMERGENCY)
Facility: CLINIC | Age: 48
Discharge: HOME | End: 2023-02-22
Attending: EMERGENCY MEDICINE | Admitting: EMERGENCY MEDICINE
Payer: OTHER MISCELLANEOUS

## 2023-02-22 ENCOUNTER — HOSPITAL ENCOUNTER (OUTPATIENT)
Dept: MRI IMAGING | Facility: HOSPITAL | Age: 48
Discharge: HOME | End: 2023-02-22
Attending: FAMILY MEDICINE | Admitting: FAMILY MEDICINE
Payer: OTHER MISCELLANEOUS

## 2023-02-22 VITALS
HEART RATE: 77 BPM | WEIGHT: 270 LBS | OXYGEN SATURATION: 97 % | DIASTOLIC BLOOD PRESSURE: 98 MMHG | RESPIRATION RATE: 16 BRPM | SYSTOLIC BLOOD PRESSURE: 136 MMHG | TEMPERATURE: 98.1 F | BODY MASS INDEX: 39.99 KG/M2 | HEIGHT: 69 IN

## 2023-02-22 DIAGNOSIS — M25.511 ACUTE PAIN OF RIGHT SHOULDER: ICD-10-CM

## 2023-02-22 DIAGNOSIS — S46.211A TRAUMATIC RUPTURE OF RIGHT PROXIMAL BICEPS TENDON, INITIAL ENCOUNTER: Primary | ICD-10-CM

## 2023-02-22 DIAGNOSIS — S46.211A TRAUMATIC RUPTURE OF RIGHT PROXIMAL BICEPS TENDON, INITIAL ENCOUNTER: ICD-10-CM

## 2023-02-22 PROCEDURE — 73030 X-RAY EXAM OF SHOULDER: CPT | Mod: RT

## 2023-02-22 PROCEDURE — 99203 OFFICE O/P NEW LOW 30 MIN: CPT | Mod: GC | Performed by: FAMILY MEDICINE

## 2023-02-22 PROCEDURE — 73221 MRI JOINT UPR EXTREM W/O DYE: CPT | Mod: RT

## 2023-02-22 PROCEDURE — 73030 X-RAY EXAM OF SHOULDER: CPT | Mod: 26 | Performed by: RADIOLOGY

## 2023-02-22 PROCEDURE — 99284 EMERGENCY DEPT VISIT MOD MDM: CPT | Performed by: EMERGENCY MEDICINE

## 2023-02-22 PROCEDURE — 99283 EMERGENCY DEPT VISIT LOW MDM: CPT | Performed by: EMERGENCY MEDICINE

## 2023-02-22 ASSESSMENT — ACTIVITIES OF DAILY LIVING (ADL): ADLS_ACUITY_SCORE: 35

## 2023-02-22 NOTE — ED PROVIDER NOTES
History     Chief Complaint   Patient presents with     Arm Injury     HPI  Yifan Quesada is a 47 year old male who presents to the ED with right shoulder area pain.  Patient reports that shortly before arrival in the ED, he was assisting with moving the patient in an inpatient room.  Patient felt a sudden pop sensation near the right shoulder with radiation into the right bicep area.  He has had difficulty flexing his elbow since the injury.  No other areas of injury, otherwise feeling well recently.     Past Medical History  Past Medical History:   Diagnosis Date     ED (erectile dysfunction)      GERD (gastroesophageal reflux disease)      Hypertension      Metabolic syndrome      Tendonitis, bicipital     bilateral     White coat hypertension      Past Surgical History:   Procedure Laterality Date     .nasal fracture repair       NASAL FRACTURE SURGERY       ibuprofen (ADVIL/MOTRIN) 200 MG capsule  meloxicam (MOBIC) 15 MG tablet  olmesartan-hydrochlorothiazide (BENICAR HCT) 40-25 MG tablet  omeprazole (PRILOSEC) 20 MG DR capsule  tadalafil (CIALIS) 5 MG tablet      Allergies   Allergen Reactions     Hmg-Coa-R Inhibitors Muscle Pain (Myalgia)     Severe symptoms while on crestor (statin)     Famotidine Cramps and Muscle Pain (Myalgia)     Cefazolin Rash     Ciprofloxacin Rash     Clavulanic Acid Rash     Doxycycline Rash     Levofloxacin Rash     Penicillins Rash     Sulfamethizole Rash     Zithromax [Azithromycin] Rash     Family History  Family History   Problem Relation Age of Onset     Other Cancer Mother         Lung cancer     Cancer Mother         lung      Depression Mother      Schizophrenia Mother      Other - See Comments Mother         low blood sugars      Snoring Mother      Polycythemia Mother      Mental Illness Mother      Coronary Artery Disease Father 48     Snoring Father      Hypertension Father      Obesity Father      Heart Failure Maternal Grandmother      Hypertension Maternal  "Grandfather      Hypertension Paternal Grandmother      Heart Failure Paternal Grandmother      Breast Cancer Paternal Grandmother      Diabetes Paternal Grandmother      Obesity Paternal Grandmother      Prostate Cancer Paternal Grandfather      Hypertension Paternal Grandfather      Obesity Paternal Grandfather      Hypertension Sister      Hypertension Sister      Depression Sister      Hypertension Sister      Depression Sister      Obesity Sister      Colon Cancer No family hx of      Thyroid Cancer No family hx of      Social History   Social History     Tobacco Use     Smoking status: Former     Packs/day: 0.00     Years: 0.00     Pack years: 0.00     Types: Clove cigarettes or kreteks, Cigarettes     Start date: 1994     Quit date: 1995     Years since quittin.9     Smokeless tobacco: Never     Tobacco comments:     in college   Vaping Use     Vaping Use: Never used   Substance Use Topics     Alcohol use: Yes     Comment: Alcoholic Drinks/day: 2 drinks per month      Drug use: Never         A medically appropriate review of systems was performed with pertinent positives and negatives noted in the HPI, and all other systems negative.    Physical Exam   BP: 135/89  Pulse: 84  Temp: 97.9  F (36.6  C)  Resp: 17  Height: 175.3 cm (5' 9\")  Weight: 122.5 kg (270 lb)  SpO2: 96 %    Physical Exam  General: no acute distress. Appears stated age.   HENT: MMM, no oropharyngeal lesions  Eyes: PERRL, normal sclerae  Cardio: regular rate. Regular rhythm. Extremities well perfused  Resp: Normal work of breathing, normal respiratory rate.  Neuro: alert and fully oriented. CN II-XII grossly intact. Grossly normal strength and sensation in all extremities with exception of right elbow flexion.   MSK: R arm: tenderness over the humeral head and bicep, limited bicep strength, mildly increased size of right bicep relative to left  Integumentary/Skin: no rash visualized, normal color  Psych: normal affect, normal " behavior    ED Course      Procedures          Labs Ordered and Resulted from Time of ED Arrival to Time of ED Departure - No data to display  XR Shoulder Right 2 Views   Final Result   IMPRESSION: Mild degenerative changes at the acromioclavicular joint. No fracture or dislocation.      US MSK Limited    (Results Pending)            Medical Decision Making  The patient's presentation was of moderate complexity (an acute complicated injury).    The patient's evaluation involved:  ordering and/or review of 2 test(s) in this encounter (see separate area of note for details)  independent interpretation of testing performed by another health professional (shoulder XR)    The patient's management necessitated further care after sign-out to Dr. Dang (see their note for further management).      Assessments & Plan (with Medical Decision Making)   Patient presenting with acute right shoulder area pain that occurred while moving a patient. Vitals in the ED unremarkable. Nursing notes reviewed. Initial differential diagnosis includes but not limited to biceps tendon tear, shoulder dislocation, AC separation, humeral head fracture, sprain.     Shoulder XR reviewed - no fracture nor dislocation visualized. Biceps tendon US pending. Patient declined medication for pain.     Patient signed out to oncoming ED provider with plan to follow-up ultrasound to assess for biceps tendon tear, likely outpatient management with a sling and orthopedics referral.    Final diagnoses:   Acute pain of right shoulder     New Prescriptions    No medications on file     --  Chicho Alvarez MD   Emergency Medicine   [facility] EMERGENCY DEPARTMENT  2/22/2023     Chicho Alvarez MD  02/22/23 0701

## 2023-02-22 NOTE — TELEPHONE ENCOUNTER
DIAGNOSIS: Acute pain of right shoulder, concern for right biceps tendon tear / Chicho Alvarez MD /  XR / UMR   APPOINTMENT DATE: 02/22/23   NOTES STATUS DETAILS   OFFICE NOTE from referring provider Internal 2/22/23 referral    DISCHARGE REPORT from the ER Internal [facility] : 2/22/23   MEDICATION LIST Internal    XRAYS (IMAGES & REPORTS) Internal 2/22/23 XR SHOULDER

## 2023-02-22 NOTE — ED TRIAGE NOTES
Pt was assisting with repositioning patient on 4a and heard a 'pop' in right arm and now feeling pain at right bicep, swelling and pain in upper arm/shoulder.  At rest pain is 3/10.  VSS, pt is alert x 4 and in no apparent distress.

## 2023-02-22 NOTE — LETTER
February 22, 2023    RE:Yifan Quesada  [address] [facility] [address]    1975            Dear Mr. Quesada      To Whom It May Concern:    Yifan Quesada is under my professional care for    Acute pain of right shoulder  Traumatic rupture of right proximal biceps tendon, initial encounter.   He may return to work on or about 4-6 weeks with the following: Light duty-unable to lift more than 10 lbs pounds, no lifting of patients, and pushing or pulling.  Needs to be reevaluated after MRI performed.      Sincerely,      Kamla Gallagher MD

## 2023-02-22 NOTE — PROGRESS NOTES
ASSESSMENT/PLAN:    Acute shoulder pain, right- probable traumatic rupture of right proximal biceps tendon  Work comp  Sling for comfort  Letter given- Needs lifting restrictions, no push/pull  Likely MRI to check possible RC tear, proximal bicep tendon      Pt is a 47 year old male here today for:     Pt is a 46 yo white male with dislocations of clavicles in wrestling as high schooler.      HPI:   right shoulder:  Location: right bicep   Duration:since 5 a.m.  Trauma/ Fall? Trauma, lifting patient   Able to walk? yes  Swelling? yes   Bruising? no  Numbness/ Tingling? Swelling into right hand   Weakness? Unable to curl  Instability? none  Snapping/Clicking? none  Imaging? Mild degenerative changes on x-ray  Treatment? Went to ER, pain tolerance is high    EXAM:   right Shoulder:   Inspection: asymmetry? Defect proximal bicep; dyskinesis? none   ROM:   Active - forward flexion - full/ abduction - full/ int rot - symmetric/ ext rot - symmetric   Passive- forward flexion - decreased/ abduction - decreased   Strength: deltoid/supraspinatous - 5/5; infraspinatous/ teres minor - 5/5; subscapularis - 5-/5   Maneuvers:   RTC - empty can - pows; painful arc - pos; lift off - pos  Impingement - Delong -neg; Neer- neg   AC - cross arm - neg   Biceps - Speed's - pos; Yergason's - positive  Labrum - Perez's - neg   Instability - Apprehension - not assessed  Palpation: AC - neg; Acromion/ supraspinatus - neg; scapula - neg; bicipital groove - neg     US to just view bicep tendon, appears to be outside bicipital groove  Past Medical History:   Diagnosis Date     ED (erectile dysfunction)      GERD (gastroesophageal reflux disease)      Hypertension      Metabolic syndrome      Tendonitis, bicipital     bilateral     White coat hypertension       Past Surgical History:   Procedure Laterality Date     .nasal fracture repair       NASAL FRACTURE SURGERY        Current Outpatient Medications   Medication Sig Dispense Refill      ibuprofen (ADVIL/MOTRIN) 200 MG capsule Take 1 capsule by mouth every 6 hours prn       meloxicam (MOBIC) 15 MG tablet Take 15 mg by mouth as needed       olmesartan-hydrochlorothiazide (BENICAR HCT) 40-25 MG tablet Take 1 tablet by mouth daily 90 tablet 3     omeprazole (PRILOSEC) 20 MG DR capsule Take 1 capsule (20 mg) by mouth daily 90 capsule 3     tadalafil (CIALIS) 5 MG tablet TAKE 1/2 TO 1 TABLET BY MOUTH DAILY 90 tablet 3      Allergies   Allergen Reactions     Hmg-Coa-R Inhibitors Muscle Pain (Myalgia)     Severe symptoms while on crestor (statin)     Famotidine Cramps and Muscle Pain (Myalgia)     Cefazolin Rash     Ciprofloxacin Rash     Clavulanic Acid Rash     Doxycycline Rash     Levofloxacin Rash     Penicillins Rash     Sulfamethizole Rash     Zithromax [Azithromycin] Rash      ROS:   Gen- no fevers/chills   Rheum - no morning stiffness   Derm - no rash/ redness   Neuro - no numbness, no tingling   Remainder of ROS negative.     Exam:   There were no vitals taken for this visit.     Xray of right shoulder on February 22, 2023 at ER location - films personally reviewed with patient at time of visit     My impression: no obvious fractures     Hector Suarez MD PGY2       Patient seen, evaluated and discussed with the resident. I have verified the content of the note, which accurately reflects my assessment of the patient and the plan of care.   Supervising Physician:  Kamla Gallagher MD

## 2023-02-22 NOTE — ED NOTES
Pt discharged to: home  Via: car     Prescriptions sent to patients preferred pharmacy. Appropriate LDA's removed from pt, telemetry discontinued. All belonging sent with patient and patient verifies taking all belongings with them.   Patient exhibits understanding of discharge instruction and all questions and/or concerns were answered.     Jackie Ag RN

## 2023-02-22 NOTE — LETTER
2/22/2023      RE: Yifan Quesada  [address]  [facility] [address]     Dear Colleague,    Thank you for referring your patient, Yfian Quesada, to the [facility] [place]. Please see a copy of my visit note below.    ASSESSMENT/PLAN:    Acute shoulder pain, right- probable traumatic rupture of right proximal biceps tendon  Work comp  Sling for comfort  Letter given- Needs lifting restrictions, no push/pull  Likely MRI to check possible RC tear, proximal bicep tendon      Pt is a 47 year old male here today for:     Pt is a 46 yo white male with dislocations of clavicles in wrestling as high schooler.      HPI:   right shoulder:  Location: right bicep   Duration:since 5 a.m.  Trauma/ Fall? Trauma, lifting patient   Able to walk? yes  Swelling? yes   Bruising? no  Numbness/ Tingling? Swelling into right hand   Weakness? Unable to curl  Instability? none  Snapping/Clicking? none  Imaging? Mild degenerative changes on x-ray  Treatment? Went to ER, pain tolerance is high    EXAM:   right Shoulder:   Inspection: asymmetry? Defect proximal bicep; dyskinesis? none   ROM:   Active - forward flexion - full/ abduction - full/ int rot - symmetric/ ext rot - symmetric   Passive- forward flexion - decreased/ abduction - decreased   Strength: deltoid/supraspinatous - 5/5; infraspinatous/ teres minor - 5/5; subscapularis - 5-/5   Maneuvers:   RTC - empty can - pows; painful arc - pos; lift off - pos  Impingement - Delong -neg; Neer- neg   AC - cross arm - neg   Biceps - Speed's - pos; Yergason's - positive  Labrum - Perez's - neg   Instability - Apprehension - not assessed  Palpation: AC - neg; Acromion/ supraspinatus - neg; scapula - neg; bicipital groove - neg     US to just view bicep tendon, appears to be outside bicipital groove  Past Medical History:   Diagnosis Date     ED (erectile dysfunction)      GERD (gastroesophageal reflux disease)      Hypertension      Metabolic syndrome       Tendonitis, bicipital     bilateral     White coat hypertension       Past Surgical History:   Procedure Laterality Date     .nasal fracture repair       NASAL FRACTURE SURGERY        Current Outpatient Medications   Medication Sig Dispense Refill     ibuprofen (ADVIL/MOTRIN) 200 MG capsule Take 1 capsule by mouth every 6 hours prn       meloxicam (MOBIC) 15 MG tablet Take 15 mg by mouth as needed       olmesartan-hydrochlorothiazide (BENICAR HCT) 40-25 MG tablet Take 1 tablet by mouth daily 90 tablet 3     omeprazole (PRILOSEC) 20 MG DR capsule Take 1 capsule (20 mg) by mouth daily 90 capsule 3     tadalafil (CIALIS) 5 MG tablet TAKE 1/2 TO 1 TABLET BY MOUTH DAILY 90 tablet 3      Allergies   Allergen Reactions     Hmg-Coa-R Inhibitors Muscle Pain (Myalgia)     Severe symptoms while on crestor (statin)     Famotidine Cramps and Muscle Pain (Myalgia)     Cefazolin Rash     Ciprofloxacin Rash     Clavulanic Acid Rash     Doxycycline Rash     Levofloxacin Rash     Penicillins Rash     Sulfamethizole Rash     Zithromax [Azithromycin] Rash      ROS:   Gen- no fevers/chills   Rheum - no morning stiffness   Derm - no rash/ redness   Neuro - no numbness, no tingling   Remainder of ROS negative.     Exam:   There were no vitals taken for this visit.     Xray of right shoulder on February 22, 2023 at ER location - films personally reviewed with patient at time of visit     My impression: no obvious fractures     Hector Suarez MD PGY2       Patient seen, evaluated and discussed with the resident. I have verified the content of the note, which accurately reflects my assessment of the patient and the plan of care.   Supervising Physician:  Kamla Gallagher MD       Again, thank you for allowing me to participate in the care of your patient.      Sincerely,    Kamla Gallagher MD

## 2023-02-22 NOTE — PATIENT INSTRUCTIONS
Acute shoulder pain, right- probable traumatic rupture of right proximal biceps tendon  Work comp  Sling for comfort  Ice  Tylenol for pain  Letter given- Needs lifting restrictions, no push/pull for approx 4-6 weeks  Likely MRI to check possible RC tear, proximal bicep tendon- need to check availability after scan done

## 2023-02-22 NOTE — ED PROVIDER NOTES
"     Emergency Department Patient Sign-out       Brief HPI:  This is a 47 year old male signed out to me by Dr. Alvarez .  See initial ED Provider note for details of the presentation. Patient with right shoulder pain, exam concerning for biceps tendon tear.  XR negative for fracture or dislocation.  Biceps tendon US pending.        Exam:   Patient Vitals for the past 24 hrs:   BP Temp Temp src Pulse Resp SpO2 Height Weight   02/22/23 0809 (!) 136/98 98.1  F (36.7  C) Oral 77 16 97 % -- --   02/22/23 0531 135/89 97.9  F (36.6  C) Oral 84 17 96 % 1.753 m (5' 9\") 122.5 kg (270 lb)           ED RESULTS:   Results for orders placed or performed during the hospital encounter of 02/22/23 (from the past 24 hour(s))   XR Shoulder Right 2 Views     Status: None    Collection Time: 02/22/23  6:47 AM    Narrative    EXAM: XR SHOULDER RIGHT 2 VIEWS  LOCATION: [facility]  DATE/TIME: 2/22/2023 6:47 AM    INDICATION: acute shoulder pain  COMPARISON: None.      Impression    IMPRESSION: Mild degenerative changes at the acromioclavicular joint. No fracture or dislocation.       ED MEDICATIONS:   Medications - No data to display      Impression:    ICD-10-CM    1. Acute pain of right shoulder  M25.511 Wrist/Arm Supplies Order Sling; Right     Orthopedic  Referral          Plan:    Pending studies include US to evaluate the biceps tendon.  Received a phone call from ultrasound stating that they are unable to perform an MSK ultrasound to evaluate for biceps tendon injury as this is not within their scope and would not be able to interpret imaging for us.  Since this would not , patient was provided sling and orthopedic referral for follow-up and discharged in stable condition.    MD Alton Lay Samantha, MD  02/22/23 3963    "

## 2023-02-22 NOTE — DISCHARGE INSTRUCTIONS
Instructions from your doctor today:  Emergency Department (ED) testing is focused on the potential causes of your symptoms that are the most dangerous possibilities, and cannot cover every possibility. Based on the evaluation, it was deemed sufficiently safe to discharge and continue management through the clinics. Thus, follow-up is very important to assess for improvement/worsening, potential further testing, and potential treatment adjustments. If you were given opioid pain medications or other medications that can make you drowsy while in the ED, you should not drive for at least several hours and not until you feel completely back to normal.     Please make an appointment to follow up with:  - Orthopedics Clinic (phone: [phone]) in about 5 days  - If you do not have a primary care provider, you can be seen in follow-up and establish care by calling any of the clinics below:     - Primary [facility] Center (phone: [phone])     - Primary Care / [facility] (phone: [phone])   - Have your clinic provider review the results from today's visit with you again, including any potential follow-up or additional testing that may be needed based on the results. Occasionally, incidental findings are found on later review by radiologists that may need follow-up.     Return to the Emergency Department immediately if you have worsening symptoms, or any other urgent health concerns.

## 2023-02-24 DIAGNOSIS — S46.211A TRAUMATIC RUPTURE OF RIGHT PROXIMAL BICEPS TENDON, INITIAL ENCOUNTER: Primary | ICD-10-CM

## 2023-03-24 ENCOUNTER — OFFICE VISIT (OUTPATIENT)
Dept: ORTHOPEDICS | Facility: CLINIC | Age: 48
End: 2023-03-24
Payer: OTHER MISCELLANEOUS

## 2023-03-24 DIAGNOSIS — S46.211D TRAUMATIC RUPTURE OF RIGHT PROXIMAL BICEPS TENDON, SUBSEQUENT ENCOUNTER: Primary | ICD-10-CM

## 2023-03-24 PROCEDURE — 99213 OFFICE O/P EST LOW 20 MIN: CPT | Performed by: FAMILY MEDICINE

## 2023-03-24 NOTE — LETTER
3/24/2023      RE: Yifan Quesada  [address] [facility] [address]     Dear Colleague,    Thank you for referring your patient, Yifan Quesada, to the [facility] [place]. Please see a copy of my visit note below.    ASSESSMENT/PLAN:    Long head biceps tendon tear-  Managing nonop  Pt ICU nurse and hurt at work, patient related cares  Not happy with lack of progress with PT  Needs 50 lb minimum to RTW  Pt requests surgical consult  Continue at lower weight to gain strength and would consider retesting 50 lbs in 4 weeks          Pt is a 47 year old male here today for:     F/U full thickness tear of intra-articular long head biceps tendon    ESTABLISHED PATIENT FOLLOW-UP:  Pain of the Right Shoulder       HISTORY OF PRESENT ILLNESS  Mr. Quesada is a pleasant 47 year old year old male who is RHD presents to clinic today for follow-up of right shoulder pain.   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      Additional medical/Social/Surgical histories reviewed in [facility] and updated as appropriate.    REVIEW OF SYSTEMS (3/24/2023)  CONSTITUTIONAL: Denies fever and weight loss  GASTROINTESTINAL: Denies abdominal pain, nausea, vomiting  MUSCULOSKELETAL: See  HPI  SKIN: Denies any recent rash or lesion  NEUROLOGICAL: Denies numbness or focal weakness      EXAM:   right Shoulder:   Inspection: asymmetry? present; dyskinesis? none   ROM:   Active - forward flexion - full/ abduction - full/ int rot - symmetric/ ext rot - symmetric   Passive- forward flexion - full/ abduction - full   Strength: deltoid/supraspinatous - 5/5; infraspinatous/ teres minor - 5/5; subscapularis - 5/5   Maneuvers:   RTC - empty can - mild tenderness; painful arc - last 10 degrees; lift off - neg   Impingement - Delong -neg; Neer- pos   AC - cross arm - neg   Biceps - Speed's - pos; Yergason's - pos   Labrum - Perez's - neg; Resendiz shear - not performed   Instability - Apprehension - neg   Palpation: AC - neg; Acromion/ supraspinatus - neg; scapula - neg; bicipital groove - tender     Past Medical History:   Diagnosis Date     ED (erectile dysfunction)      GERD (gastroesophageal reflux disease)      Hypertension      Metabolic syndrome      Tendonitis, bicipital     bilateral     White coat hypertension       Past Surgical History:   Procedure Laterality Date     .nasal fracture repair       NASAL FRACTURE SURGERY        Current Outpatient Medications   Medication Sig Dispense Refill     ibuprofen (ADVIL/MOTRIN) 200 MG capsule Take 1 capsule by mouth every 6 hours prn       meloxicam (MOBIC) 15 MG tablet Take 15 mg by mouth as needed       olmesartan-hydrochlorothiazide (BENICAR HCT) 40-25 MG tablet Take 1 tablet by mouth daily 90 tablet 3     omeprazole (PRILOSEC) 20 MG DR capsule Take 1 capsule (20 mg) by mouth daily 90 capsule 3     tadalafil (CIALIS) 5 MG tablet TAKE 1/2 TO 1 TABLET BY MOUTH DAILY 90 tablet 3      Allergies   Allergen Reactions     Hmg-Coa-R Inhibitors Muscle Pain (Myalgia)     Severe symptoms while on crestor (statin)     Famotidine Cramps and Muscle Pain (Myalgia)     Cefazolin Rash     Ciprofloxacin Rash     Clavulanic Acid Rash     Doxycycline Rash     Levofloxacin Rash      Penicillins Rash     Sulfamethizole Rash     Zithromax [Azithromycin] Rash      ROS:   Gen- no fevers/chills   Rheum - no morning stiffness   Derm - no rash/ redness   Neuro - no numbness, no tingling   Remainder of ROS negative.     Exam:   There were no vitals taken for this visit.     Xray of right shoulder-     My impression: mild AC changes    MRI of right shoulder on 2/22 2023 at [facility] location - films personally reviewed with patient at time of visit     My impression: long head biceps tear  RC tendinopathy        Again, thank you for allowing me to participate in the care of your patient.      Sincerely,    Kamla Gallagher MD

## 2023-03-24 NOTE — PROGRESS NOTES
ASSESSMENT/PLAN:    Long head biceps tendon tear-  Managing nonop  Pt ICU nurse and hurt at work, patient related cares  Not happy with lack of progress with PT  Needs 50 lb minimum to RTW  Pt requests surgical consult  Continue at lower weight to gain strength and would consider retesting 50 lbs in 4 weeks          Pt is a 47 year old male here today for:     F/U full thickness tear of intra-articular long head biceps tendon    ESTABLISHED PATIENT FOLLOW-UP:  Pain of the Right Shoulder       HISTORY OF PRESENT ILLNESS  Mr. Quesada is a pleasant 47 year old year old male who is RHD presents to clinic today for follow-up of right shoulder pain.   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      Additional medical/Social/Surgical histories reviewed in [facility] and updated as appropriate.    REVIEW OF SYSTEMS (3/24/2023)  CONSTITUTIONAL: Denies fever and weight loss  GASTROINTESTINAL: Denies abdominal pain, nausea, vomiting  MUSCULOSKELETAL: See HPI  SKIN: Denies any recent rash or lesion  NEUROLOGICAL: Denies numbness or focal weakness      EXAM:   right Shoulder:   Inspection: asymmetry? present; dyskinesis? none   ROM:   Active - forward flexion - full/ abduction - full/ int rot - symmetric/ ext  rot - symmetric   Passive- forward flexion - full/ abduction - full   Strength: deltoid/supraspinatous - 5/5; infraspinatous/ teres minor - 5/5; subscapularis - 5/5   Maneuvers:   RTC - empty can - mild tenderness; painful arc - last 10 degrees; lift off - neg   Impingement - Delong -neg; Neer- pos   AC - cross arm - neg   Biceps - Speed's - pos; Yergason's - pos   Labrum - Perez's - neg; Resendiz shear - not performed   Instability - Apprehension - neg   Palpation: AC - neg; Acromion/ supraspinatus - neg; scapula - neg; bicipital groove - tender     Past Medical History:   Diagnosis Date     ED (erectile dysfunction)      GERD (gastroesophageal reflux disease)      Hypertension      Metabolic syndrome      Tendonitis, bicipital     bilateral     White coat hypertension       Past Surgical History:   Procedure Laterality Date     .nasal fracture repair       NASAL FRACTURE SURGERY        Current Outpatient Medications   Medication Sig Dispense Refill     ibuprofen (ADVIL/MOTRIN) 200 MG capsule Take 1 capsule by mouth every 6 hours prn       meloxicam (MOBIC) 15 MG tablet Take 15 mg by mouth as needed       olmesartan-hydrochlorothiazide (BENICAR HCT) 40-25 MG tablet Take 1 tablet by mouth daily 90 tablet 3     omeprazole (PRILOSEC) 20 MG DR capsule Take 1 capsule (20 mg) by mouth daily 90 capsule 3     tadalafil (CIALIS) 5 MG tablet TAKE 1/2 TO 1 TABLET BY MOUTH DAILY 90 tablet 3      Allergies   Allergen Reactions     Hmg-Coa-R Inhibitors Muscle Pain (Myalgia)     Severe symptoms while on crestor (statin)     Famotidine Cramps and Muscle Pain (Myalgia)     Cefazolin Rash     Ciprofloxacin Rash     Clavulanic Acid Rash     Doxycycline Rash     Levofloxacin Rash     Penicillins Rash     Sulfamethizole Rash     Zithromax [Azithromycin] Rash      ROS:   Gen- no fevers/chills   Rheum - no morning stiffness   Derm - no rash/ redness   Neuro - no numbness, no tingling   Remainder of ROS negative.     Exam:   There were no  vitals taken for this visit.     Xray of right shoulder-     My impression: mild AC changes    MRI of right shoulder on 2/22 2023 at [facility] location - films personally reviewed with patient at time of visit     My impression: long head biceps tear  RC tendinopathy

## 2023-03-24 NOTE — PATIENT INSTRUCTIONS
Long head biceps tendon tear-  Managing nonop  Pt ICU nurse and hurt at work, patient related cares  Not happy with lack of progress with PT  Needs 50 lb minimum to RTW  Pt requests surgical consult  Continue at lower weight to gain strength and would consider retesting 50 lbs in 4 weeks

## 2023-03-29 ENCOUNTER — OFFICE VISIT (OUTPATIENT)
Dept: ORTHOPEDICS | Facility: CLINIC | Age: 48
End: 2023-03-29
Payer: OTHER MISCELLANEOUS

## 2023-03-29 VITALS — WEIGHT: 283 LBS | BODY MASS INDEX: 41.79 KG/M2 | DIASTOLIC BLOOD PRESSURE: 90 MMHG | SYSTOLIC BLOOD PRESSURE: 135 MMHG

## 2023-03-29 DIAGNOSIS — S46.211D TRAUMATIC RUPTURE OF RIGHT PROXIMAL BICEPS TENDON, SUBSEQUENT ENCOUNTER: ICD-10-CM

## 2023-03-29 PROCEDURE — 99204 OFFICE O/P NEW MOD 45 MIN: CPT | Performed by: ORTHOPAEDIC SURGERY

## 2023-03-29 NOTE — LETTER
March 29, 2023      Yifan Quesada  [address] [facility] [address]        To Whom It May Concern:    Yifan Quesada was seen in our clinic. He may return to work with the following: limited to light duty - lifting, pushing or pulling no greater than 20 pounds, no repetitive motions and no over head lifting for 6 weeks. Return to full after 6 weeks     Sincerely,        ROOPA VITAL MD

## 2023-03-29 NOTE — PROGRESS NOTES
CHIEF COMPLAINT: Right shoulder pain    DIAGNOSIS: Right shoulder long head of the biceps tendon rupture    OCCUPATION/SPORT: ICU nurse    HPI:   Yifan Quesada is a very pleasant 47 year old, right-hand dominant male who presents for evaluation of right shoulder pain.  Symptoms started in 5 weeks . Therea precipitating event moving patient and patient fought back heard snap  . The pain is located to the right shoulder in anterior portion shoulder and distal bicep. Worst pain is rated a 4 of 10, and current pain is rated at 1 of 10. Symptoms are worsened by overuse, and heavy weights. Symptoms are improved with nothing. Patient has tried PT and ice with limited  relief. Associated symptoms include dull pain Patient has no  radiating down the arm, nor numbness. Notably, the patient has had MRI (2/22/23) . No other concerns or complaints at this time.    PAST MEDICAL HISTORY:  Past Medical History:   Diagnosis Date     ED (erectile dysfunction)      GERD (gastroesophageal reflux disease)      Hypertension      Metabolic syndrome      Tendonitis, bicipital     bilateral     White coat hypertension        PAST SURGICAL HISTORY:  Past Surgical History:   Procedure Laterality Date     .nasal fracture repair       NASAL FRACTURE SURGERY         CURRENT MEDICATIONS:  Current Outpatient Medications   Medication Sig Dispense Refill     ibuprofen (ADVIL/MOTRIN) 200 MG capsule Take 1 capsule by mouth every 6 hours prn       meloxicam (MOBIC) 15 MG tablet Take 15 mg by mouth as needed       olmesartan-hydrochlorothiazide (BENICAR HCT) 40-25 MG tablet Take 1 tablet by mouth daily 90 tablet 3     omeprazole (PRILOSEC) 20 MG DR capsule Take 1 capsule (20 mg) by mouth daily 90 capsule 3     tadalafil (CIALIS) 5 MG tablet TAKE 1/2 TO 1 TABLET BY MOUTH DAILY 90 tablet 3       ALLERGIES:      Allergies   Allergen Reactions     Hmg-Coa-R Inhibitors Muscle Pain (Myalgia)     Severe symptoms while on crestor (statin)     Famotidine Cramps  and Muscle Pain (Myalgia)     Cefazolin Rash     Ciprofloxacin Rash     Clavulanic Acid Rash     Doxycycline Rash     Levofloxacin Rash     Penicillins Rash     Sulfamethizole Rash     Zithromax [Azithromycin] Rash         FAMILY HISTORY: No pertinent family history, reviewed in EMR.    SOCIAL HISTORY:   Social History     Socioeconomic History     Marital status: Single     Spouse name: Not on file     Number of children: Not on file     Years of education: Not on file     Highest education level: Not on file   Occupational History     Occupation: ICU nurse     Employer: [employer]   Tobacco Use     Smoking status: Former     Packs/day: 0.00     Years: 0.00     Pack years: 0.00     Types: Clove cigarettes or kreteks, Cigarettes     Start date: 1994     Quit date: 1995     Years since quittin.0     Smokeless tobacco: Never     Tobacco comments:     in college   Vaping Use     Vaping Use: Never used   Substance and Sexual Activity     Alcohol use: Yes     Comment: Alcoholic Drinks/day: 2 drinks per month      Drug use: Never     Sexual activity: Yes     Partners: Female     Birth control/protection: I.U.D.   Other Topics Concern     Parent/sibling w/ CABG, MI or angioplasty before 65F 55M? Yes   Social History Narrative     Not on file     Social Determinants of Health     Financial Resource Strain: Not on file   Food Insecurity: Not on file   Transportation Needs: Not on file   Physical Activity: Not on file   Stress: Not on file   Social Connections: Not on file   Intimate Partner Violence: Not on file   Housing Stability: Not on file       REVIEW OF SYSTEMS: Positive for that noted in past medical history and history of present illness and otherwise reviewed in EMR    PHYSICAL EXAM:  Patient is Data Unavailable and weighs 0 lbs 0 oz There were no vitals taken for this visit.  Body mass index is 41.79 kg/m .   Constitutional: Well-developed, well-nourished, healthy appearing male.  Skin: Warm, dry    HEENT: Normal  Cardiac: Well perfused extremities, strong 2+ peripheral pulses. No edema.   Pulmonary: Breathing room air    Musculoskeletal:   Right Shoulder:  AROM right shoulder: 160/50/L1   AROM left shoulder: 160/50/L1/   5/5 supraspinatus, 5/5 infraspinatus, 5/5 subscapularis  no AC joint pain, negative cross body adduction  negative Neer and Delong impingement signs  negative belly-press/lift-off  positive Speed's test  negative Apprehension  No significant Anatoly deformity.  Neurovascular exam and cervical spine exam are normal.     IMAGING: Personally reviewed the patient's x-rays and MRI which demonstrate that there was a tear of the long head of the biceps tendon.  I do not see any associated rotator cuff.  There is no significant fatty atrophy of the rotator cuff.    IMPRESSION: 47 year old-year-old right hand dominant male, with acute traumatic rupture of the long head of the biceps tendon    PLAN:     I discussed with the patient the etiology of their condition. We discussed at length the options as noted above.  I went through the results of the MRI with the patient today.  We discussed that a long head of the biceps tendon tear will result in some cramping in the biceps, potential for Anatoly deformity, there is minimal strength deficit associated.  I discussed with the patient that surgical intervention is unlikely to change strength, may potentially help with some cosmetic deformity from the Anatoly however this is also not a guarantee.  I gave recommendations for conservative treatment measures including continue with physical therapy.  We discussed work restrictions including no lifting more than 20 pounds, no repeated pushing pulling, lifting overhead, for 6 weeks time, then expect MMI with return to full duty.  Patient can follow-up if needed.    At the conclusion of the office visit, Yifan verbally acknowledged that I answered all of his questions satisfactorily.    Sandhya Cedillo,  MD  Orthopedic Surgery Sports Medicine and Shoulder Surgery

## 2023-03-29 NOTE — LETTER
3/29/2023         RE: Yifan Quesada  [address] [facility] [place] [address]        Dear Colleague,    Thank you for referring your patient, Yifan Quesada, to the [facility] [place]. Please see a copy of my visit note below.    CHIEF COMPLAINT: Right shoulder pain    DIAGNOSIS: Right shoulder long head of the biceps tendon rupture    OCCUPATION/SPORT: ICU nurse    HPI:   Yifan Quesada is a very pleasant 47 year old, right-hand dominant male who presents for evaluation of right shoulder pain.  Symptoms started in 5 weeks . Therea precipitating event moving patient and patient fought back heard snap  . The pain is located to the right shoulder in anterior portion shoulder and distal bicep. Worst pain is rated a 4 of 10, and current pain is rated at 1 of 10. Symptoms are worsened by overuse, and heavy weights. Symptoms are improved with nothing. Patient has tried PT and ice with limited  relief. Associated symptoms include dull pain Patient has no  radiating down the arm, nor numbness. Notably, the patient has had MRI (2/22/23) . No other concerns or complaints at this time.    PAST MEDICAL HISTORY:  Past Medical History:   Diagnosis Date     ED (erectile dysfunction)      GERD (gastroesophageal reflux disease)      Hypertension      Metabolic syndrome      Tendonitis, bicipital     bilateral     White coat hypertension        PAST SURGICAL HISTORY:  Past Surgical History:   Procedure Laterality Date     .nasal fracture repair       NASAL FRACTURE SURGERY         CURRENT MEDICATIONS:  Current Outpatient Medications   Medication Sig Dispense Refill     ibuprofen (ADVIL/MOTRIN) 200 MG capsule Take 1 capsule by mouth every 6 hours prn       meloxicam (MOBIC) 15 MG tablet Take 15 mg by mouth as needed       olmesartan-hydrochlorothiazide (BENICAR HCT) 40-25 MG tablet Take 1 tablet by mouth daily 90 tablet 3     omeprazole (PRILOSEC) 20 MG DR capsule Take 1 capsule (20 mg) by mouth daily 90  capsule 3     tadalafil (CIALIS) 5 MG tablet TAKE 1/2 TO 1 TABLET BY MOUTH DAILY 90 tablet 3       ALLERGIES:      Allergies   Allergen Reactions     Hmg-Coa-R Inhibitors Muscle Pain (Myalgia)     Severe symptoms while on crestor (statin)     Famotidine Cramps and Muscle Pain (Myalgia)     Cefazolin Rash     Ciprofloxacin Rash     Clavulanic Acid Rash     Doxycycline Rash     Levofloxacin Rash     Penicillins Rash     Sulfamethizole Rash     Zithromax [Azithromycin] Rash         FAMILY HISTORY: No pertinent family history, reviewed in EMR.    SOCIAL HISTORY:   Social History     Socioeconomic History     Marital status: Single     Spouse name: Not on file     Number of children: Not on file     Years of education: Not on file     Highest education level: Not on file   Occupational History     Occupation: ICU nurse     Employer: [employer]   Tobacco Use     Smoking status: Former     Packs/day: 0.00     Years: 0.00     Pack years: 0.00     Types: Clove cigarettes or kreteks, Cigarettes     Start date: 1994     Quit date: 1995     Years since quittin.0     Smokeless tobacco: Never     Tobacco comments:     in college   Vaping Use     Vaping Use: Never used   Substance and Sexual Activity     Alcohol use: Yes     Comment: Alcoholic Drinks/day: 2 drinks per month      Drug use: Never     Sexual activity: Yes     Partners: Female     Birth control/protection: I.U.D.   Other Topics Concern     Parent/sibling w/ CABG, MI or angioplasty before 65F 55M? Yes   Social History Narrative     Not on file     Social Determinants of Health     Financial Resource Strain: Not on file   Food Insecurity: Not on file   Transportation Needs: Not on file   Physical Activity: Not on file   Stress: Not on file   Social Connections: Not on file   Intimate Partner Violence: Not on file   Housing Stability: Not on file       REVIEW OF SYSTEMS: Positive for that noted in past medical history and history of present illness and  otherwise reviewed in EMR    PHYSICAL EXAM:  Patient is Data Unavailable and weighs 0 lbs 0 oz There were no vitals taken for this visit.  Body mass index is 41.79 kg/m .   Constitutional: Well-developed, well-nourished, healthy appearing male.  Skin: Warm, dry   HEENT: Normal  Cardiac: Well perfused extremities, strong 2+ peripheral pulses. No edema.   Pulmonary: Breathing room air    Musculoskeletal:   Right Shoulder:  AROM right shoulder: 160/50/L1   AROM left shoulder: 160/50/L1/   5/5 supraspinatus, 5/5 infraspinatus, 5/5 subscapularis  no AC joint pain, negative cross body adduction  negative Neer and Delong impingement signs  negative belly-press/lift-off  positive Speed's test  negative Apprehension  No significant Anatoly deformity.  Neurovascular exam and cervical spine exam are normal.     IMAGING: Personally reviewed the patient's x-rays and MRI which demonstrate that there was a tear of the long head of the biceps tendon.  I do not see any associated rotator cuff.  There is no significant fatty atrophy of the rotator cuff.    IMPRESSION: 47 year old-year-old right hand dominant male, with acute traumatic rupture of the long head of the biceps tendon    PLAN:     I discussed with the patient the etiology of their condition. We discussed at length the options as noted above.  I went through the results of the MRI with the patient today.  We discussed that a long head of the biceps tendon tear will result in some cramping in the biceps, potential for Anatoly deformity, there is minimal strength deficit associated.  I discussed with the patient that surgical intervention is unlikely to change strength, may potentially help with some cosmetic deformity from the Anatoly however this is also not a guarantee.  I gave recommendations for conservative treatment measures including continue with physical therapy.  We discussed work restrictions including no lifting more than 20 pounds, no repeated pushing pulling,  lifting overhead, for 6 weeks time, then expect MMI with return to full duty.  Patient can follow-up if needed.    At the conclusion of the office visit, Yifan verbally acknowledged that I answered all of his questions satisfactorily.    Sandhya Vital MD  Orthopedic Surgery Sports Medicine and Shoulder Surgery        Again, thank you for allowing me to participate in the care of your patient.        Sincerely,        SANDHYA VITAL MD

## 2023-03-30 ENCOUNTER — TELEPHONE (OUTPATIENT)
Dept: ORTHOPEDICS | Facility: CLINIC | Age: 48
End: 2023-03-30
Payer: COMMERCIAL

## 2023-03-30 NOTE — TELEPHONE ENCOUNTER
Health Call Center    Phone Message    May a detailed message be left on voicemail: no     Reason for Call: Other: Requesting work note stating actually return date to work. Can't just be so many weeks. Would like it emailed to [email or web address]

## 2023-03-30 NOTE — LETTER
March 29, 2023      Yifan Quesada  [address] [facility] [place] [address]        To Whom It May Concern:    Yifan Quesada was seen in our clinic. He may return to work with the following: limited to light duty - lifting, pushing or pulling no greater than 20 pounds, no repetitive motions and no over head lifting for 6 weeks. Return to full after 6 weeks, on 5/11/23.      Sincerely,        ROOPA VITAL MD

## 2023-03-31 NOTE — TELEPHONE ENCOUNTER
Letter sent to patient. Patient notified he can receive it via [email or web address]. No other questions at this time.    Chip Ochoa ATC

## 2023-03-31 NOTE — TELEPHONE ENCOUNTER
I'm not sure how to approve a pended letter, but ok to give approve it for whatever date patient specified

## 2023-05-17 ENCOUNTER — TELEPHONE (OUTPATIENT)
Dept: ORTHOPEDICS | Facility: CLINIC | Age: 48
End: 2023-05-17
Payer: COMMERCIAL

## 2023-05-17 NOTE — TELEPHONE ENCOUNTER
Reason for Call:  Form, our goal is to have forms completed with 7 days, however, some forms may require a visit or additional information.    Type of letter, form or note:  health care provider report     Who is the form from?: Insurance    Where did the form come from: form was faxed in    Phone number of person requesting form: n/a  Can we leave a detailed message on this number:  Not Applicable    Desired completion date of form: asap      How will form be returned?:  fax to [phone]    Has the patient signed a consent form for release of information (may be included with form)? Not Applicable        Form was started and place in Provider Basket for provider review/ completion at [facility].

## 2023-05-24 ENCOUNTER — MEDICAL CORRESPONDENCE (OUTPATIENT)
Dept: HEALTH INFORMATION MANAGEMENT | Facility: CLINIC | Age: 48
End: 2023-05-24
Payer: COMMERCIAL

## 2023-05-24 NOTE — TELEPHONE ENCOUNTER
Forms have been faxed to desired location, copy sent to scan and placed in file.     Chip Ochoa ATC

## 2023-06-02 ENCOUNTER — HEALTH MAINTENANCE LETTER (OUTPATIENT)
Age: 48
End: 2023-06-02

## 2023-06-05 DIAGNOSIS — N52.9 ERECTILE DYSFUNCTION, UNSPECIFIED ERECTILE DYSFUNCTION TYPE: ICD-10-CM

## 2023-06-05 RX ORDER — TADALAFIL 5 MG/1
TABLET ORAL
Qty: 90 TABLET | Refills: 3 | Status: SHIPPED | OUTPATIENT
Start: 2023-06-05 | End: 2024-05-28

## 2023-06-05 NOTE — TELEPHONE ENCOUNTER
"Last Written Prescription Date: 4/29/22  Last Fill Quantity: 90, # refills: 3  Last office visit provider: has upcoming appt 6/30/23        Requested Prescriptions   Pending Prescriptions Disp Refills     tadalafil (CIALIS) 5 MG tablet [Pharmacy Med Name: TADALAFIL 5MG TABS] 90 tablet 3     Sig: TAKE 1/2 TO 1 TABLET BY MOUTH DAILY       Erectile Dysfuction Protocol Passed - 6/5/2023 12:56 PM        Passed - Absence of nitrates on medication list        Passed - Absence of Alpha Blockers on Med list        Passed - Recent (12 mo) or future (30 days) visit within the authorizing provider's specialty     Patient has had an office visit with the authorizing provider or a provider within the authorizing providers department within the previous 12 mos or has a future within next 30 days. See \"Patient Info\" tab in inbasket, or \"Choose Columns\" in Meds & Orders section of the refill encounter.              Passed - Medication is active on med list        Passed - Patient is age 18 or older                 Ale Dejesus RN 06/05/23 3:38 PM  "

## 2023-06-29 ASSESSMENT — ENCOUNTER SYMPTOMS
DIARRHEA: 0
DIZZINESS: 0
SHORTNESS OF BREATH: 0
NAUSEA: 0
NERVOUS/ANXIOUS: 0
HEMATURIA: 0
HEMATOCHEZIA: 0
WEAKNESS: 0
MYALGIAS: 0
DYSURIA: 0
FEVER: 0
CONSTIPATION: 0
CHILLS: 0
SORE THROAT: 0
ABDOMINAL PAIN: 0
HEARTBURN: 0
JOINT SWELLING: 0
PALPITATIONS: 0
COUGH: 0
ARTHRALGIAS: 0
HEADACHES: 0
EYE PAIN: 0
FREQUENCY: 0
PARESTHESIAS: 0

## 2023-06-30 ENCOUNTER — OFFICE VISIT (OUTPATIENT)
Dept: FAMILY MEDICINE | Facility: CLINIC | Age: 48
End: 2023-06-30
Payer: COMMERCIAL

## 2023-06-30 VITALS
RESPIRATION RATE: 16 BRPM | HEIGHT: 69 IN | WEIGHT: 279.5 LBS | SYSTOLIC BLOOD PRESSURE: 138 MMHG | OXYGEN SATURATION: 94 % | BODY MASS INDEX: 41.4 KG/M2 | TEMPERATURE: 98.6 F | DIASTOLIC BLOOD PRESSURE: 88 MMHG | HEART RATE: 77 BPM

## 2023-06-30 DIAGNOSIS — Z78.9 STATIN INTOLERANCE: ICD-10-CM

## 2023-06-30 DIAGNOSIS — N52.9 ERECTILE DYSFUNCTION, UNSPECIFIED ERECTILE DYSFUNCTION TYPE: ICD-10-CM

## 2023-06-30 DIAGNOSIS — Z82.49 FAMILY HISTORY OF HEART DISEASE IN MALE FAMILY MEMBER BEFORE AGE 55: ICD-10-CM

## 2023-06-30 DIAGNOSIS — I10 ESSENTIAL HYPERTENSION: ICD-10-CM

## 2023-06-30 DIAGNOSIS — E66.01 CLASS 2 SEVERE OBESITY DUE TO EXCESS CALORIES WITH SERIOUS COMORBIDITY AND BODY MASS INDEX (BMI) OF 39.0 TO 39.9 IN ADULT (H): ICD-10-CM

## 2023-06-30 DIAGNOSIS — E78.00 HYPERCHOLESTEREMIA: ICD-10-CM

## 2023-06-30 DIAGNOSIS — K76.0 NAFLD (NONALCOHOLIC FATTY LIVER DISEASE): ICD-10-CM

## 2023-06-30 DIAGNOSIS — N18.31 STAGE 3A CHRONIC KIDNEY DISEASE (H): ICD-10-CM

## 2023-06-30 DIAGNOSIS — Z00.00 ROUTINE GENERAL MEDICAL EXAMINATION AT A HEALTH CARE FACILITY: Primary | ICD-10-CM

## 2023-06-30 DIAGNOSIS — E66.812 CLASS 2 SEVERE OBESITY DUE TO EXCESS CALORIES WITH SERIOUS COMORBIDITY AND BODY MASS INDEX (BMI) OF 39.0 TO 39.9 IN ADULT (H): ICD-10-CM

## 2023-06-30 DIAGNOSIS — E88.810 METABOLIC SYNDROME X: ICD-10-CM

## 2023-06-30 LAB
ALBUMIN UR-MCNC: NEGATIVE MG/DL
APPEARANCE UR: CLEAR
BILIRUB UR QL STRIP: NEGATIVE
COLOR UR AUTO: YELLOW
GLUCOSE UR STRIP-MCNC: NEGATIVE MG/DL
HGB BLD-MCNC: 16.2 G/DL (ref 13.3–17.7)
HGB UR QL STRIP: NEGATIVE
KETONES UR STRIP-MCNC: ABNORMAL MG/DL
LEUKOCYTE ESTERASE UR QL STRIP: NEGATIVE
NITRATE UR QL: NEGATIVE
PH UR STRIP: 5.5 [PH] (ref 5–8)
SP GR UR STRIP: 1.02 (ref 1–1.03)
UROBILINOGEN UR STRIP-ACNC: 0.2 E.U./DL

## 2023-06-30 PROCEDURE — 82570 ASSAY OF URINE CREATININE: CPT | Performed by: FAMILY MEDICINE

## 2023-06-30 PROCEDURE — 85018 HEMOGLOBIN: CPT | Performed by: FAMILY MEDICINE

## 2023-06-30 PROCEDURE — 99214 OFFICE O/P EST MOD 30 MIN: CPT | Mod: 25 | Performed by: FAMILY MEDICINE

## 2023-06-30 PROCEDURE — 99396 PREV VISIT EST AGE 40-64: CPT | Performed by: FAMILY MEDICINE

## 2023-06-30 PROCEDURE — 80061 LIPID PANEL: CPT | Performed by: FAMILY MEDICINE

## 2023-06-30 PROCEDURE — 80053 COMPREHEN METABOLIC PANEL: CPT | Performed by: FAMILY MEDICINE

## 2023-06-30 PROCEDURE — 82043 UR ALBUMIN QUANTITATIVE: CPT | Performed by: FAMILY MEDICINE

## 2023-06-30 PROCEDURE — 36415 COLL VENOUS BLD VENIPUNCTURE: CPT | Performed by: FAMILY MEDICINE

## 2023-06-30 PROCEDURE — 81003 URINALYSIS AUTO W/O SCOPE: CPT | Performed by: FAMILY MEDICINE

## 2023-06-30 RX ORDER — OLMESARTAN MEDOXOMIL AND HYDROCHLOROTHIAZIDE 40/25 40; 25 MG/1; MG/1
1 TABLET ORAL DAILY
Qty: 90 TABLET | Refills: 3 | Status: SHIPPED | OUTPATIENT
Start: 2023-06-30 | End: 2024-07-01

## 2023-06-30 ASSESSMENT — ENCOUNTER SYMPTOMS
PALPITATIONS: 0
NAUSEA: 0
JOINT SWELLING: 0
ABDOMINAL PAIN: 0
SHORTNESS OF BREATH: 0
NERVOUS/ANXIOUS: 0
MYALGIAS: 0
DIZZINESS: 0
HEMATURIA: 0
FREQUENCY: 0
HEMATOCHEZIA: 0
HEADACHES: 0
SORE THROAT: 0
EYE PAIN: 0
HEARTBURN: 0
DYSURIA: 0
FEVER: 0
WEAKNESS: 0
CONSTIPATION: 0
DIARRHEA: 0
ARTHRALGIAS: 0
COUGH: 0
PARESTHESIAS: 0
CHILLS: 0

## 2023-06-30 NOTE — ASSESSMENT & PLAN NOTE
This patient has a history of elevated cholesterol levels family history of heart disease in father who had first event in his 40s.  The patient had severe myalgias while on rosuvastatin a number of years ago.  He has not been on alternative statin therapies.  Check fasting lipids today.  Discussed physical activity and exercise.  Blood pressure the upper range of tolerable given his current antihypertensive regimen.  Discussed potential benefit of CT calcium score.  This will be completed.  If the score is high I would like him to see a preventative cardiologist to discuss statin therapy.  I think thoughtful titration of an alternative statin such as atorvastatin might be a reasonable next step.  We would have to monitor liver labs given nonalcoholic fatty liver disease.

## 2023-06-30 NOTE — PROGRESS NOTES
SUBJECTIVE:   CC: Yifan is an 47 year old who presents for preventative health visit.       2023    11:04 AM   Additional Questions   Roomed by sac   Accompanied by self         2023    11:04 AM   Patient Reported Additional Medications   Patient reports taking the following new medications no     Healthy Habits:     Getting at least 3 servings of Calcium per day:  Yes    Bi-annual eye exam:  Yes    Dental care twice a year:  NO    Sleep apnea or symptoms of sleep apnea:  Sleep apnea    Diet:  Other    Frequency of exercise:  2-3 days/week    Duration of exercise:  45-60 minutes    Taking medications regularly:  Yes    Medication side effects:  None    Additional concerns today:  No    Chief Complaint   Patient presents with     Physical     Pt. Fasting.     No specific concerns.  Recent biceps rupture.  Stress (mom and dad in hospital).     BP:   - no concerns today.  Working to get back into exercise.  Would like the opportunity to change lifestyle.    ED: Tadalafil helpful.  No side effects.    Social History     Tobacco Use     Smoking status: Former     Packs/day: 0.00     Years: 0.00     Pack years: 0.00     Types: Cigarettes, Clove cigarettes or kreteks     Start date: 1994     Quit date: 1995     Years since quittin.2     Smokeless tobacco: Never     Tobacco comments:     in college   Substance Use Topics     Alcohol use: Yes     Comment: Alcoholic Drinks/day: 2 drinks per month            2023     5:01 PM   Alcohol Use   Prescreen: >3 drinks/day or >7 drinks/week? No       Last PSA: No results found for: PSA    Reviewed orders with patient. Reviewed health maintenance and updated orders accordingly - Yes    Reviewed and updated as needed this visit by clinical staff   Tobacco  Allergies  Meds  Problems  Med Hx  Surg Hx  Fam Hx          Reviewed and updated as needed this visit by Provider   Tobacco  Allergies  Meds  Problems  Med Hx  Surg Hx  Fam Hx        "      Review of Systems   Constitutional: Negative for chills and fever.   HENT: Negative for congestion, ear pain, hearing loss and sore throat.    Eyes: Negative for pain and visual disturbance.   Respiratory: Negative for cough and shortness of breath.    Cardiovascular: Negative for chest pain, palpitations and peripheral edema.   Gastrointestinal: Negative for abdominal pain, constipation, diarrhea, heartburn, hematochezia and nausea.   Genitourinary: Positive for impotence. Negative for dysuria, frequency, genital sores, hematuria, penile discharge and urgency.   Musculoskeletal: Negative for arthralgias, joint swelling and myalgias.   Skin: Negative for rash.   Neurological: Negative for dizziness, weakness, headaches and paresthesias.   Psychiatric/Behavioral: Negative for mood changes. The patient is not nervous/anxious.          OBJECTIVE:   /88   Pulse 77   Temp 98.6  F (37  C) (Oral)   Resp 16   Ht 1.76 m (5' 9.3\")   Wt 126.8 kg (279 lb 8 oz)   SpO2 94%   BMI 40.92 kg/m      Physical Exam  Vitals reviewed.   Constitutional:       General: He is not in acute distress.     Appearance: Normal appearance. He is obese.   HENT:      Head: Normocephalic and atraumatic.      Right Ear: External ear normal.      Left Ear: External ear normal.      Nose: Nose normal.      Mouth/Throat:      Pharynx: No oropharyngeal exudate or posterior oropharyngeal erythema.   Eyes:      General: No scleral icterus.  Cardiovascular:      Rate and Rhythm: Normal rate and regular rhythm.      Heart sounds: Normal heart sounds. No murmur heard.     No friction rub. No gallop.   Pulmonary:      Effort: Pulmonary effort is normal. No respiratory distress.      Breath sounds: No wheezing.   Abdominal:      General: Bowel sounds are normal. There is no distension.      Palpations: Abdomen is soft. There is no mass.      Tenderness: There is no abdominal tenderness.   Musculoskeletal:         General: No swelling. Normal " range of motion.      Cervical back: Normal range of motion.   Skin:     Findings: No rash.      Comments: Innumerable moles.  There are 7 distinct dark smaller than 6 mm lesions on his back.  All of them are round and homogenous of appearance.   Neurological:      General: No focal deficit present.      Mental Status: He is alert and oriented to person, place, and time.      Cranial Nerves: No cranial nerve deficit.      Deep Tendon Reflexes: Reflexes normal.   Psychiatric:         Mood and Affect: Mood normal.         Behavior: Behavior normal.         Thought Content: Thought content normal.         Judgment: Judgment normal.       Diagnostic Test Results:  Labs reviewed in Epic    ASSESSMENT/PLAN:     Problem List Items Addressed This Visit     Class 2 severe obesity due to excess calories with serious comorbidity and body mass index (BMI) of 39.0 to 39.9 in adult (H)    Erectile dysfunction     Daily dose tadalafil.  Continue current therapy.  I did not review association between erectile dysfunction and heart disease.  CT calcium score and lipids today.         Essential hypertension    Relevant Medications    olmesartan-hydrochlorothiazide (BENICAR HCT) 40-25 MG tablet    Other Relevant Orders    Comprehensive metabolic panel (BMP + Alb, Alk Phos, ALT, AST, Total. Bili, TP)    Family history of heart disease in male family member before age 55     Father passed away of heart related causes at ae 72.          Relevant Orders    Comprehensive metabolic panel (BMP + Alb, Alk Phos, ALT, AST, Total. Bili, TP)    Lipid panel reflex to direct LDL Fasting    CT Coronary Calcium Scan    Hypercholesteremia     This patient has a history of elevated cholesterol levels family history of heart disease in father who had first event in his 40s.  The patient had severe myalgias while on rosuvastatin a number of years ago.  He has not been on alternative statin therapies.  Check fasting lipids today.  Discussed physical  activity and exercise.  Blood pressure the upper range of tolerable given his current antihypertensive regimen.  Discussed potential benefit of CT calcium score.  This will be completed.  If the score is high I would like him to see a preventative cardiologist to discuss statin therapy.  I think thoughtful titration of an alternative statin such as atorvastatin might be a reasonable next step.  We would have to monitor liver labs given nonalcoholic fatty liver disease.         Metabolic syndrome X    Relevant Orders    Comprehensive metabolic panel (BMP + Alb, Alk Phos, ALT, AST, Total. Bili, TP)    Lipid panel reflex to direct LDL Fasting    NAFLD (nonalcoholic fatty liver disease)     Check liver labs today.  Continue to monitor overall metabolic health.         Relevant Orders    Comprehensive metabolic panel (BMP + Alb, Alk Phos, ALT, AST, Total. Bili, TP)    Lipid panel reflex to direct LDL Fasting    Routine general medical examination at a [facility] care facility - Primary     This patient presents for annual exam with no specific concerns other than medication check for blood pressure.  He states that the past 6 months has been challenging as he has dealt with short-term disability as result of a proximal biceps partial tear and the death of his father who he had to care for while on hospice.  He has noticed a deficit in his arm since his injury but given the location of the injury, there is no surgical fix.  He enjoys weight lifting weights and has been limited in physical activity.  He believes this is contributing to slight increase in his blood pressure.  He otherwise feels well.  - Lab work today.         Stage 3a chronic kidney disease (H)    Relevant Orders    Albumin Random Urine Quantitative with Creat Ratio    UA Macroscopic with reflex to Microscopic and Culture    Hemoglobin    Statin intolerance       Patient has been advised of split billing requirements and indicates understanding:  "Yes      COUNSELING:   Reviewed preventive health counseling, as reflected in patient instructions       Regular exercise       Healthy diet/nutrition      BMI:   Estimated body mass index is 40.92 kg/m  as calculated from the following:    Height as of this encounter: 1.76 m (5' 9.3\").    Weight as of this encounter: 126.8 kg (279 lb 8 oz).   Weight management plan: Discussed healthy diet and exercise guidelines    He reports that he quit smoking about 28 years ago. His smoking use included cigarettes and clove cigarettes or kreteks. He started smoking about 28 years ago. He has never used smokeless tobacco.    Devang Sepulveda MD  [facility]  "

## 2023-06-30 NOTE — ASSESSMENT & PLAN NOTE
This patient presents for annual exam with no specific concerns other than medication check for blood pressure.  He states that the past 6 months has been challenging as he has dealt with short-term disability as result of a proximal biceps partial tear and the death of his father who he had to care for while on hospice.  He has noticed a deficit in his arm since his injury but given the location of the injury, there is no surgical fix.  He enjoys weight lifting weights and has been limited in physical activity.  He believes this is contributing to slight increase in his blood pressure.  He otherwise feels well.  - Lab work today.

## 2023-06-30 NOTE — ASSESSMENT & PLAN NOTE
Daily dose tadalafil.  Continue current therapy.  I did not review association between erectile dysfunction and heart disease.  CT calcium score and lipids today.

## 2023-07-01 LAB
ALBUMIN SERPL BCG-MCNC: 4.5 G/DL (ref 3.5–5.2)
ALP SERPL-CCNC: 60 U/L (ref 40–129)
ALT SERPL W P-5'-P-CCNC: 103 U/L (ref 0–70)
ANION GAP SERPL CALCULATED.3IONS-SCNC: 11 MMOL/L (ref 7–15)
AST SERPL W P-5'-P-CCNC: 56 U/L (ref 0–45)
BILIRUB SERPL-MCNC: 0.7 MG/DL
BUN SERPL-MCNC: 18.4 MG/DL (ref 6–20)
CALCIUM SERPL-MCNC: 9.4 MG/DL (ref 8.6–10)
CHLORIDE SERPL-SCNC: 101 MMOL/L (ref 98–107)
CHOLEST SERPL-MCNC: 231 MG/DL
CREAT SERPL-MCNC: 1.33 MG/DL (ref 0.67–1.17)
CREAT UR-MCNC: 252 MG/DL
DEPRECATED HCO3 PLAS-SCNC: 28 MMOL/L (ref 22–29)
GFR SERPL CREATININE-BSD FRML MDRD: 66 ML/MIN/1.73M2
GLUCOSE SERPL-MCNC: 95 MG/DL (ref 70–99)
HDLC SERPL-MCNC: 34 MG/DL
LDLC SERPL CALC-MCNC: 173 MG/DL
MICROALBUMIN UR-MCNC: <12 MG/L
MICROALBUMIN/CREAT UR: NORMAL MG/G{CREAT}
NONHDLC SERPL-MCNC: 197 MG/DL
POTASSIUM SERPL-SCNC: 4 MMOL/L (ref 3.4–5.3)
PROT SERPL-MCNC: 7.4 G/DL (ref 6.4–8.3)
SODIUM SERPL-SCNC: 140 MMOL/L (ref 136–145)
TRIGL SERPL-MCNC: 118 MG/DL

## 2023-07-03 DIAGNOSIS — K76.0 NAFLD (NONALCOHOLIC FATTY LIVER DISEASE): Primary | ICD-10-CM

## 2023-07-05 ENCOUNTER — ANCILLARY PROCEDURE (OUTPATIENT)
Dept: ULTRASOUND IMAGING | Facility: CLINIC | Age: 48
End: 2023-07-05
Attending: FAMILY MEDICINE
Payer: COMMERCIAL

## 2023-07-05 DIAGNOSIS — K76.0 NAFLD (NONALCOHOLIC FATTY LIVER DISEASE): ICD-10-CM

## 2023-07-05 PROCEDURE — 76981 USE PARENCHYMA: CPT | Mod: GC | Performed by: RADIOLOGY

## 2023-07-06 ENCOUNTER — ANCILLARY ORDERS (OUTPATIENT)
Dept: FAMILY MEDICINE | Facility: CLINIC | Age: 48
End: 2023-07-06

## 2023-07-06 ENCOUNTER — HOSPITAL ENCOUNTER (OUTPATIENT)
Dept: CT IMAGING | Facility: CLINIC | Age: 48
Discharge: HOME | End: 2023-07-06
Attending: FAMILY MEDICINE | Admitting: FAMILY MEDICINE
Payer: COMMERCIAL

## 2023-07-06 DIAGNOSIS — Z82.49 FAMILY HISTORY OF HEART DISEASE IN MALE FAMILY MEMBER BEFORE AGE 55: ICD-10-CM

## 2023-07-06 LAB
CV CALCIUM SCORE AGATSTON LM: 0
CV CALCIUM SCORING AGATSON LAD: 32
CV CALCIUM SCORING AGATSTON CX: 0
CV CALCIUM SCORING AGATSTON RCA: 36
CV CALCIUM SCORING AGATSTON TOTAL: 68

## 2023-07-06 PROCEDURE — 75571 CT HRT W/O DYE W/CA TEST: CPT

## 2023-07-06 PROCEDURE — 75571 CT HRT W/O DYE W/CA TEST: CPT | Mod: 26 | Performed by: GENERAL ACUTE CARE HOSPITAL

## 2023-07-20 ENCOUNTER — MYC MEDICAL ADVICE (OUTPATIENT)
Dept: FAMILY MEDICINE | Facility: CLINIC | Age: 48
End: 2023-07-20
Payer: COMMERCIAL

## 2023-07-20 DIAGNOSIS — I25.10 CORONARY ARTERY CALCIFICATION SEEN ON CT SCAN: ICD-10-CM

## 2023-07-20 DIAGNOSIS — Z78.9 STATIN INTOLERANCE: Primary | ICD-10-CM

## 2023-07-20 DIAGNOSIS — E88.810 METABOLIC SYNDROME X: ICD-10-CM

## 2023-07-20 DIAGNOSIS — E78.00 HYPERCHOLESTEREMIA: ICD-10-CM

## 2023-08-17 ENCOUNTER — OFFICE VISIT (OUTPATIENT)
Dept: CARDIOLOGY | Facility: CLINIC | Age: 48
End: 2023-08-17
Attending: FAMILY MEDICINE
Payer: COMMERCIAL

## 2023-08-17 VITALS
SYSTOLIC BLOOD PRESSURE: 125 MMHG | WEIGHT: 278.1 LBS | RESPIRATION RATE: 14 BRPM | OXYGEN SATURATION: 96 % | HEART RATE: 69 BPM | DIASTOLIC BLOOD PRESSURE: 80 MMHG | HEIGHT: 69 IN | BODY MASS INDEX: 41.19 KG/M2

## 2023-08-17 DIAGNOSIS — I25.10 CORONARY ARTERY CALCIFICATION SEEN ON CT SCAN: ICD-10-CM

## 2023-08-17 DIAGNOSIS — E78.00 HYPERCHOLESTEREMIA: ICD-10-CM

## 2023-08-17 DIAGNOSIS — Z78.9 STATIN INTOLERANCE: ICD-10-CM

## 2023-08-17 DIAGNOSIS — E88.810 METABOLIC SYNDROME X: ICD-10-CM

## 2023-08-17 PROCEDURE — 93000 ELECTROCARDIOGRAM COMPLETE: CPT | Performed by: GENERAL ACUTE CARE HOSPITAL

## 2023-08-17 PROCEDURE — 99204 OFFICE O/P NEW MOD 45 MIN: CPT | Performed by: INTERNAL MEDICINE

## 2023-08-17 RX ORDER — EVOLOCUMAB 140 MG/ML
140 INJECTION, SOLUTION SUBCUTANEOUS
Qty: 1 ML | Refills: 11 | Status: SHIPPED | OUTPATIENT
Start: 2023-08-17 | End: 2024-07-19

## 2023-08-17 RX ORDER — EZETIMIBE 10 MG/1
10 TABLET ORAL DAILY
Qty: 90 TABLET | Refills: 3 | Status: SHIPPED | OUTPATIENT
Start: 2023-08-17 | End: 2023-12-18

## 2023-08-17 NOTE — PROGRESS NOTES
HEART CARE ENCOUNTER CONSULTATON NOTE      [facility]  [phone]      Assessment/Recommendations   Assessment:  1.  Coronary artery disease:: Total Agatston calcium score of 68 placing him in 87 percentile.  Recommend aggressive risk factor modification.  Due to statin intolerance we will start on Zetia and Repatha.  2.  Dyslipidemia: LDL extremely elevated over 170 with known coronary artery disease.  Goal LDL of less than 70  3.  Statin intolerance  4.  Hypertension: Well-controlled  5.  Morbid obesity  Plan:  1.  Start Zetia 10 mg daily and will start prior authorization for Repatha  2.  Continue regular exercise and work on improved diet, weight loss  3.  Repeat fasting lipids in a few months, follow-up with me in a year       History of Present Illness/Subjective    HPI: Yifan Quesada is a 47 year old male with history of morbid obesity, dyslipidemia, family history of heart disease, hypertension who I am seeing today for initial consultation due to risk factors and dyslipidemia.  Father had history of myocardial infarction at age 49 years old and recently passed away from heart failure.  Patient is a non-smoker and only rare alcohol.  He normally exercises very regularly however this past year at the end of February he ruptured his biceps tendon.  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  He has slowly recovered and is now exercising 2-3 times a week.  He does kettle bells and elliptical for activity.  Feels good with activity denies any shortness of breath or chest pain.  For his dyslipidemia he was tried on Crestor he believes about 15 years ago and developed PVCs, fatigue and extreme muscle weakness/pain.  Since then he has stayed off of statins.  He tried Vascepa and developed diarrhea.  He tries to follow a healthy diet.    Twelve-lead EKG personally reviewed demonstrates normal sinus rhythm at 73 bpm, normal-appearing  "EKG.  Lipids 6/30/2023: Total cholesterol 231, triglycerides 118, HDL 34,     CT coronary calcium scan: 7/6/2023    The total Agatston score is 68. A calcium score in this range places the individual in the 87th percentile when compared to an age and gender matched control group.    Radiology review for incidental non cardiac findings will be under separate report by the radiologist.     Physical Examination  Review of Systems   Vitals: /80 (BP Location: Left arm, Patient Position: Sitting, Cuff Size: Adult Large)   Pulse 69   Resp 14   Ht 1.76 m (5' 9.3\")   Wt 126.1 kg (278 lb 1.6 oz)   SpO2 96%   BMI 40.71 kg/m    BMI= Body mass index is 40.71 kg/m .  Wt Readings from Last 3 Encounters:   08/17/23 126.1 kg (278 lb 1.6 oz)   06/30/23 126.8 kg (279 lb 8 oz)   03/29/23 128.4 kg (283 lb)       General Appearance:   no distress, normal body habitus   ENT/Mouth: membranes moist, no oral lesions or bleeding gums.      EYES:  no scleral icterus, normal conjunctivae   Neck: no carotid bruits or thyromegaly   Chest/Lungs:   lungs are clear to auscultation   Cardiovascular:   Regular. Normal first and second heart sounds with no murmur no edema bilaterally    Abdomen:  bowel sounds are present   Extremities: no cyanosis or clubbing   Skin: no xanthelasma, warm.    Neurologic: normal  bilateral, no tremors     Psychiatric: alert and oriented x3, calm        Please refer above for cardiac ROS details.        Medical History  Surgical History Family History Social History   Past Medical History:   Diagnosis Date    ED (erectile dysfunction)     GERD (gastroesophageal reflux disease)     Hypertension     Metabolic syndrome     Tendonitis, bicipital     bilateral    White coat hypertension      Past Surgical History:   Procedure Laterality Date    .nasal fracture repair      NASAL FRACTURE SURGERY       Family History   Problem Relation Age of Onset    Other Cancer Mother         Lung cancer    Cancer " Mother         lung     Depression Mother     Schizophrenia Mother     Other - See Comments Mother         low blood sugars     Snoring Mother     Polycythemia Mother     Mental Illness Mother     Hypertension Mother     Coronary Artery Disease Father     Snoring Father     Hypertension Father     Obesity Father     Hypertension Sister     Depression Sister     Obesity Sister     Heart Failure Maternal Grandmother     Hypertension Maternal Grandfather     Hypertension Paternal Grandmother     Heart Failure Paternal Grandmother     Breast Cancer Paternal Grandmother     Diabetes Paternal Grandmother     Obesity Paternal Grandmother     Prostate Cancer Paternal Grandfather     Hypertension Paternal Grandfather     Obesity Paternal Grandfather     Colon Cancer No family hx of     Thyroid Cancer No family hx of         Social History     Socioeconomic History    Marital status: Single     Spouse name: Not on file    Number of children: Not on file    Years of education: Not on file    Highest education level: Not on file   Occupational History    Occupation: ICU nurse     Employer: [employer]   Tobacco Use    Smoking status: Former     Packs/day: 0.00     Years: 0.00     Pack years: 0.00     Types: Cigarettes, Clove cigarettes or kreteks     Start date: 1994     Quit date: 1995     Years since quittin.3    Smokeless tobacco: Never    Tobacco comments:     in college   Vaping Use    Vaping Use: Never used   Substance and Sexual Activity    Alcohol use: Yes     Comment: Alcoholic Drinks/day: 2 drinks per month     Drug use: Never    Sexual activity: Yes     Partners: Female     Birth control/protection: I.U.D.   Other Topics Concern    Parent/sibling w/ CABG, MI or angioplasty before 65F 55M? Yes   Social History Narrative    Not on file     Social Determinants of Health     Financial Resource Strain: Not on file   Food Insecurity: Not on file   Transportation Needs: Not on file   Physical Activity: Not on  file   Stress: Not on file   Social Connections: Not on file   Intimate Partner Violence: Not on file   Housing Stability: Not on file           Medications  Allergies   Current Outpatient Medications   Medication Sig Dispense Refill    ibuprofen (ADVIL/MOTRIN) 200 MG capsule Take 1 capsule by mouth every 6 hours prn      MULTIPLE VITAMIN PO Take by mouth daily      olmesartan-hydrochlorothiazide (BENICAR HCT) 40-25 MG tablet Take 1 tablet by mouth daily 90 tablet 3    omeprazole (PRILOSEC) 20 MG DR capsule Take 1 capsule (20 mg) by mouth daily 90 capsule 3    tadalafil (CIALIS) 5 MG tablet TAKE 1/2 TO 1 TABLET BY MOUTH DAILY 90 tablet 3       Allergies   Allergen Reactions    Statins Muscle Pain (Myalgia)     Severe symptoms while on crestor (statin)    Famotidine Cramps and Muscle Pain (Myalgia)    Cefazolin Rash    Ciprofloxacin Rash    Clavulanic Acid Rash    Doxycycline Rash    Levofloxacin Rash    Penicillins Rash    Sulfamethizole Rash    Zithromax [Azithromycin] Rash          Lab Results    Chemistry/lipid CBC Cardiac Enzymes/BNP/TSH/INR   Recent Labs   Lab Test 06/30/23  1144   CHOL 231*   HDL 34*   *   TRIG 118     Recent Labs   Lab Test 06/30/23  1144 04/29/22  1109 07/19/21  0906   * 171* 144*     Recent Labs   Lab Test 06/30/23  1144      POTASSIUM 4.0   CHLORIDE 101   CO2 28   GLC 95   BUN 18.4   CR 1.33*   GFRESTIMATED 66   CURRY 9.4     Recent Labs   Lab Test 06/30/23  1144 08/09/22  1010 04/29/22  1109   CR 1.33* 1.25* 1.51*     Recent Labs   Lab Test 04/29/22  1109 07/19/21  0906 10/21/20  0754   A1C 5.5 5.4 5.6          Recent Labs   Lab Test 06/30/23  1144   HGB 16.2     Recent Labs   Lab Test 06/30/23  1144 08/09/22  1010   HGB 16.2 15.8    No results for input(s): TROPONINI in the last 74975 hours.  No results for input(s): BNP, NTBNPI, NTBNP in the last 93601 hours.  Recent Labs   Lab Test 04/29/22  1109   TSH 1.86     No results for input(s): INR in the last 88854 hours.      Fátima Chanel MD

## 2023-08-17 NOTE — LETTER
8/17/2023    Devang Sepulveda MD  [address]  [facility] [address]    RE: Yifan Quesada       Dear Colleague,     I had the pleasure of seeing Yifan Quesada in the [facility].    HEART CARE ENCOUNTER CONSULTATON NOTE      [facility]  [phone]      Assessment/Recommendations   Assessment:  1.  Coronary artery disease:: Total Agatston calcium score of 68 placing him in 87 percentile.  Recommend aggressive risk factor modification.  Due to statin intolerance we will start on Zetia and Repatha.  2.  Dyslipidemia: LDL extremely elevated over 170 with known coronary artery disease.  Goal LDL of less than 70  3.  Statin intolerance  4.  Hypertension: Well-controlled  5.  Morbid obesity  Plan:  1.  Start Zetia 10 mg daily and will start prior authorization for Repatha  2.  Continue regular exercise and work on improved diet, weight loss  3.  Repeat fasting lipids in a few months, follow-up with me in a year       History of Present Illness/Subjective    HPI: Yifan Quesada is a 47 year old male with history of morbid obesity, dyslipidemia, family history of heart disease, hypertension who I am seeing today for initial consultation due to risk factors and dyslipidemia.  Father had history of myocardial infarction at age 49 years old and recently passed away from heart failure.  Patient is a non-smoker and only rare alcohol.  He normally exercises very regularly however this past year at the end of February he ruptured his biceps tendon.  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  He has slowly recovered and is now exercising 2-3 times a week.  He does kettle bells and elliptical for activity.  Feels good with activity denies any shortness of breath or chest pain.  For his dyslipidemia he was tried on Crestor he believes about 15 years ago and developed PVCs, fatigue and extreme muscle weakness/pain.  Since then  "he has stayed off of statins.  He tried Vascepa and developed diarrhea.  He tries to follow a healthy diet.    Twelve-lead EKG personally reviewed demonstrates normal sinus rhythm at 73 bpm, normal-appearing EKG.  Lipids 6/30/2023: Total cholesterol 231, triglycerides 118, HDL 34,     CT coronary calcium scan: 7/6/2023    The total Agatston score is 68. A calcium score in this range places the individual in the 87th percentile when compared to an age and gender matched control group.    Radiology review for incidental non cardiac findings will be under separate report by the radiologist.     Physical Examination  Review of Systems   Vitals: /80 (BP Location: Left arm, Patient Position: Sitting, Cuff Size: Adult Large)   Pulse 69   Resp 14   Ht 1.76 m (5' 9.3\")   Wt 126.1 kg (278 lb 1.6 oz)   SpO2 96%   BMI 40.71 kg/m    BMI= Body mass index is 40.71 kg/m .  Wt Readings from Last 3 Encounters:   08/17/23 126.1 kg (278 lb 1.6 oz)   06/30/23 126.8 kg (279 lb 8 oz)   03/29/23 128.4 kg (283 lb)       General Appearance:   no distress, normal body habitus   ENT/Mouth: membranes moist, no oral lesions or bleeding gums.      EYES:  no scleral icterus, normal conjunctivae   Neck: no carotid bruits or thyromegaly   Chest/Lungs:   lungs are clear to auscultation   Cardiovascular:   Regular. Normal first and second heart sounds with no murmur no edema bilaterally    Abdomen:  bowel sounds are present   Extremities: no cyanosis or clubbing   Skin: no xanthelasma, warm.    Neurologic: normal  bilateral, no tremors     Psychiatric: alert and oriented x3, calm        Please refer above for cardiac ROS details.        Medical History  Surgical History Family History Social History   Past Medical History:   Diagnosis Date    ED (erectile dysfunction)     GERD (gastroesophageal reflux disease)     Hypertension     Metabolic syndrome     Tendonitis, bicipital     bilateral    White coat hypertension      Past " Surgical History:   Procedure Laterality Date    .nasal fracture repair      NASAL FRACTURE SURGERY       Family History   Problem Relation Age of Onset    Other Cancer Mother         Lung cancer    Cancer Mother         lung     Depression Mother     Schizophrenia Mother     Other - See Comments Mother         low blood sugars     Snoring Mother     Polycythemia Mother     Mental Illness Mother     Hypertension Mother     Coronary Artery Disease Father     Snoring Father     Hypertension Father     Obesity Father     Hypertension Sister     Depression Sister     Obesity Sister     Heart Failure Maternal Grandmother     Hypertension Maternal Grandfather     Hypertension Paternal Grandmother     Heart Failure Paternal Grandmother     Breast Cancer Paternal Grandmother     Diabetes Paternal Grandmother     Obesity Paternal Grandmother     Prostate Cancer Paternal Grandfather     Hypertension Paternal Grandfather     Obesity Paternal Grandfather     Colon Cancer No family hx of     Thyroid Cancer No family hx of         Social History     Socioeconomic History    Marital status: Single     Spouse name: Not on file    Number of children: Not on file    Years of education: Not on file    Highest education level: Not on file   Occupational History    Occupation: ICU nurse     Employer: [employer]   Tobacco Use    Smoking status: Former     Packs/day: 0.00     Years: 0.00     Pack years: 0.00     Types: Cigarettes, Clove cigarettes or kreteks     Start date: 1994     Quit date: 1995     Years since quittin.3    Smokeless tobacco: Never    Tobacco comments:     in college   Vaping Use    Vaping Use: Never used   Substance and Sexual Activity    Alcohol use: Yes     Comment: Alcoholic Drinks/day: 2 drinks per month     Drug use: Never    Sexual activity: Yes     Partners: Female     Birth control/protection: I.U.D.   Other Topics Concern    Parent/sibling w/ CABG, MI or angioplasty before 65F 55M? Yes   Social  History Narrative    Not on file     Social Determinants of Health     Financial Resource Strain: Not on file   Food Insecurity: Not on file   Transportation Needs: Not on file   Physical Activity: Not on file   Stress: Not on file   Social Connections: Not on file   Intimate Partner Violence: Not on file   Housing Stability: Not on file           Medications  Allergies   Current Outpatient Medications   Medication Sig Dispense Refill    ibuprofen (ADVIL/MOTRIN) 200 MG capsule Take 1 capsule by mouth every 6 hours prn      MULTIPLE VITAMIN PO Take by mouth daily      olmesartan-hydrochlorothiazide (BENICAR HCT) 40-25 MG tablet Take 1 tablet by mouth daily 90 tablet 3    omeprazole (PRILOSEC) 20 MG DR capsule Take 1 capsule (20 mg) by mouth daily 90 capsule 3    tadalafil (CIALIS) 5 MG tablet TAKE 1/2 TO 1 TABLET BY MOUTH DAILY 90 tablet 3       Allergies   Allergen Reactions    Statins Muscle Pain (Myalgia)     Severe symptoms while on crestor (statin)    Famotidine Cramps and Muscle Pain (Myalgia)    Cefazolin Rash    Ciprofloxacin Rash    Clavulanic Acid Rash    Doxycycline Rash    Levofloxacin Rash    Penicillins Rash    Sulfamethizole Rash    Zithromax [Azithromycin] Rash          Lab Results    Chemistry/lipid CBC Cardiac Enzymes/BNP/TSH/INR   Recent Labs   Lab Test 06/30/23  1144   CHOL 231*   HDL 34*   *   TRIG 118     Recent Labs   Lab Test 06/30/23  1144 04/29/22  1109 07/19/21  0906   * 171* 144*     Recent Labs   Lab Test 06/30/23  1144      POTASSIUM 4.0   CHLORIDE 101   CO2 28   GLC 95   BUN 18.4   CR 1.33*   GFRESTIMATED 66   CURRY 9.4     Recent Labs   Lab Test 06/30/23  1144 08/09/22  1010 04/29/22  1109   CR 1.33* 1.25* 1.51*     Recent Labs   Lab Test 04/29/22  1109 07/19/21  0906 10/21/20  0754   A1C 5.5 5.4 5.6          Recent Labs   Lab Test 06/30/23  1144   HGB 16.2     Recent Labs   Lab Test 06/30/23  1144 08/09/22  1010   HGB 16.2 15.8    No results for input(s): TROPONINI  in the last 93317 hours.  No results for input(s): BNP, NTBNPI, NTBNP in the last 72350 hours.  Recent Labs   Lab Test 04/29/22  1109   TSH 1.86     No results for input(s): INR in the last 10791 hours.     Fátima Chanel MD      Thank you for allowing me to participate in the care of your patient.      Sincerely,     Fátima Chanel MD     [facility]  cc:   Devang Sepulveda MD  [address] [place] [address]

## 2023-08-21 LAB
ATRIAL RATE - MUSE: 73 BPM
DIASTOLIC BLOOD PRESSURE - MUSE: NORMAL MMHG
INTERPRETATION ECG - MUSE: NORMAL
P AXIS - MUSE: 51 DEGREES
PR INTERVAL - MUSE: 150 MS
QRS DURATION - MUSE: 88 MS
QT - MUSE: 394 MS
QTC - MUSE: 434 MS
R AXIS - MUSE: 26 DEGREES
SYSTOLIC BLOOD PRESSURE - MUSE: NORMAL MMHG
T AXIS - MUSE: 27 DEGREES
VENTRICULAR RATE- MUSE: 73 BPM

## 2023-08-30 ENCOUNTER — TELEPHONE (OUTPATIENT)
Dept: CARDIOLOGY | Facility: CLINIC | Age: 48
End: 2023-08-30
Payer: COMMERCIAL

## 2023-08-30 NOTE — TELEPHONE ENCOUNTER
Pharmacy will contact [facility] and let them know this was approved.  Pharmacy will contact patient when this is ready for .

## 2023-08-30 NOTE — TELEPHONE ENCOUNTER
Central Prior Authorization Team   Phone: [phone]    PA Initiation    Medication: Repatha  Insurance Company: [employer] - Phone [phone] Fax [phone]  Pharmacy Filling the Rx: [id number] [place], [place] - [address] AT [facility] [address]  Filling Pharmacy Phone: [phone]  Filling Pharmacy Fax:    Start Date: 8/30/2023

## 2023-08-30 NOTE — TELEPHONE ENCOUNTER
PA Initiation    Medication:  Repatha 140mg / ml  Insurance Company:  [facility]  Pharmacy Filling the Rx:  [facility]  Filling Pharmacy Phone:  [phone]  Filling Pharmacy Fax:  [phone]  Start Date:   8-17-23    CoverMyMeds  Key:  [id number]

## 2023-08-30 NOTE — TELEPHONE ENCOUNTER
"Return call to [facility] - informed rep that PA request was never rec'd from pharmacy for Repatha Rx sent 8-17-23 - rep agreed to re-fax PA request.  mg  "

## 2023-08-30 NOTE — TELEPHONE ENCOUNTER
Prior Authorization Approval    Authorization Effective Date: 8/30/2023  Authorization Expiration Date: 8/29/2024  Medication: Repatha  Approved Dose/Quantity:   Reference #:     Insurance Company: [employer] - Phone [phone] Fax [phone]  Expected CoPay:       CoPay Card Available:      Foundation Assistance Needed:    Which Pharmacy is filling the prescription (Not needed for infusion/clinic administered): [id number] [address] AT [facility] OF [address]  Pharmacy Notified: Yes  Patient Notified: Yes

## 2023-08-30 NOTE — TELEPHONE ENCOUNTER
[facility] Call Center    Phone Message    May a detailed message be left on voicemail: yes     Reason for Call: Other: Snow called from [employer] requesting a status update on the prior auth that was sent to Dr. Chanel for Yifan's Repatha. Please reach out to Snow at [phone] to discuss. Thank you!     Rx# [id number]  Store# [id number]    Action Taken: Other: Cardiology    Travel Screening: Not Applicable    Thank you!  [facility]

## 2023-09-08 NOTE — TELEPHONE ENCOUNTER
Incoming rleens fax requesting medication change stating that Repatha not approved. However documentation shows it is approved, and pt dispensed on 9/1/23. Resolved. JOHNATHON,RN

## 2023-12-11 ENCOUNTER — LAB (OUTPATIENT)
Dept: LAB | Facility: CLINIC | Age: 48
End: 2023-12-11
Payer: COMMERCIAL

## 2023-12-11 DIAGNOSIS — Z78.9 STATIN INTOLERANCE: ICD-10-CM

## 2023-12-11 DIAGNOSIS — I25.10 CORONARY ARTERY CALCIFICATION SEEN ON CT SCAN: ICD-10-CM

## 2023-12-11 PROCEDURE — 80061 LIPID PANEL: CPT

## 2023-12-11 PROCEDURE — 36415 COLL VENOUS BLD VENIPUNCTURE: CPT

## 2023-12-12 LAB
CHOLEST SERPL-MCNC: 96 MG/DL
FASTING STATUS PATIENT QL REPORTED: YES
HDLC SERPL-MCNC: 33 MG/DL
LDLC SERPL CALC-MCNC: 38 MG/DL
NONHDLC SERPL-MCNC: 63 MG/DL
TRIGL SERPL-MCNC: 127 MG/DL

## 2023-12-18 DIAGNOSIS — I25.10 CORONARY ARTERY CALCIFICATION SEEN ON CT SCAN: ICD-10-CM

## 2023-12-18 DIAGNOSIS — Z78.9 STATIN INTOLERANCE: Primary | ICD-10-CM

## 2023-12-18 DIAGNOSIS — E78.00 HYPERCHOLESTEREMIA: ICD-10-CM

## 2024-01-13 DIAGNOSIS — K21.9 GASTROESOPHAGEAL REFLUX DISEASE, UNSPECIFIED WHETHER ESOPHAGITIS PRESENT: ICD-10-CM

## 2024-04-09 DIAGNOSIS — K21.9 GASTROESOPHAGEAL REFLUX DISEASE, UNSPECIFIED WHETHER ESOPHAGITIS PRESENT: ICD-10-CM

## 2024-05-24 ENCOUNTER — TELEPHONE (OUTPATIENT)
Dept: FAMILY MEDICINE | Facility: CLINIC | Age: 49
End: 2024-05-24
Payer: COMMERCIAL

## 2024-05-24 NOTE — TELEPHONE ENCOUNTER
Patient Quality Outreach    Patient is due for the following:   IVD  -  Aspirin    Next Steps:   Patient has upcoming appointment, these items will be addressed at that time.    Type of outreach:    Chart review performed, no outreach needed.      Questions for provider review:    None           Leona Dang MA

## 2024-05-28 DIAGNOSIS — N52.9 ERECTILE DYSFUNCTION, UNSPECIFIED ERECTILE DYSFUNCTION TYPE: ICD-10-CM

## 2024-05-28 RX ORDER — TADALAFIL 5 MG/1
TABLET ORAL
Qty: 90 TABLET | Refills: 0 | Status: SHIPPED | OUTPATIENT
Start: 2024-05-28 | End: 2024-07-08

## 2024-06-15 DIAGNOSIS — K21.9 GASTROESOPHAGEAL REFLUX DISEASE, UNSPECIFIED WHETHER ESOPHAGITIS PRESENT: ICD-10-CM

## 2024-06-29 DIAGNOSIS — I10 ESSENTIAL HYPERTENSION: ICD-10-CM

## 2024-07-01 RX ORDER — OLMESARTAN MEDOXOMIL AND HYDROCHLOROTHIAZIDE 40/25 40; 25 MG/1; MG/1
1 TABLET ORAL DAILY
Qty: 90 TABLET | Refills: 0 | Status: SHIPPED | OUTPATIENT
Start: 2024-07-01 | End: 2024-07-08

## 2024-07-03 SDOH — HEALTH STABILITY: PHYSICAL HEALTH: ON AVERAGE, HOW MANY MINUTES DO YOU ENGAGE IN EXERCISE AT THIS LEVEL?: 30 MIN

## 2024-07-03 SDOH — HEALTH STABILITY: PHYSICAL HEALTH: ON AVERAGE, HOW MANY DAYS PER WEEK DO YOU ENGAGE IN MODERATE TO STRENUOUS EXERCISE (LIKE A BRISK WALK)?: 3 DAYS

## 2024-07-03 ASSESSMENT — SOCIAL DETERMINANTS OF HEALTH (SDOH): HOW OFTEN DO YOU GET TOGETHER WITH FRIENDS OR RELATIVES?: ONCE A WEEK

## 2024-07-08 ENCOUNTER — OFFICE VISIT (OUTPATIENT)
Dept: FAMILY MEDICINE | Facility: CLINIC | Age: 49
End: 2024-07-08
Attending: FAMILY MEDICINE
Payer: COMMERCIAL

## 2024-07-08 ENCOUNTER — TELEPHONE (OUTPATIENT)
Dept: FAMILY MEDICINE | Facility: CLINIC | Age: 49
End: 2024-07-08

## 2024-07-08 VITALS
BODY MASS INDEX: 42.24 KG/M2 | DIASTOLIC BLOOD PRESSURE: 79 MMHG | TEMPERATURE: 98.4 F | OXYGEN SATURATION: 95 % | HEIGHT: 69 IN | SYSTOLIC BLOOD PRESSURE: 130 MMHG | WEIGHT: 285.2 LBS | RESPIRATION RATE: 16 BRPM | HEART RATE: 77 BPM

## 2024-07-08 DIAGNOSIS — E66.813 CLASS 3 SEVERE OBESITY DUE TO EXCESS CALORIES WITH SERIOUS COMORBIDITY AND BODY MASS INDEX (BMI) OF 40.0 TO 44.9 IN ADULT (H): ICD-10-CM

## 2024-07-08 DIAGNOSIS — N18.31 STAGE 3A CHRONIC KIDNEY DISEASE (H): ICD-10-CM

## 2024-07-08 DIAGNOSIS — E78.00 HYPERCHOLESTEREMIA: ICD-10-CM

## 2024-07-08 DIAGNOSIS — N52.9 ERECTILE DYSFUNCTION, UNSPECIFIED ERECTILE DYSFUNCTION TYPE: ICD-10-CM

## 2024-07-08 DIAGNOSIS — E66.01 CLASS 3 SEVERE OBESITY DUE TO EXCESS CALORIES WITH SERIOUS COMORBIDITY AND BODY MASS INDEX (BMI) OF 40.0 TO 44.9 IN ADULT (H): ICD-10-CM

## 2024-07-08 DIAGNOSIS — Z00.00 ROUTINE GENERAL MEDICAL EXAMINATION AT A HEALTH CARE FACILITY: Primary | ICD-10-CM

## 2024-07-08 DIAGNOSIS — K76.0 NAFLD (NONALCOHOLIC FATTY LIVER DISEASE): ICD-10-CM

## 2024-07-08 DIAGNOSIS — I10 ESSENTIAL HYPERTENSION: ICD-10-CM

## 2024-07-08 DIAGNOSIS — M05.80 POLYARTHRITIS WITH POSITIVE RHEUMATOID FACTOR (H): ICD-10-CM

## 2024-07-08 DIAGNOSIS — R52 BODY ACHES: ICD-10-CM

## 2024-07-08 DIAGNOSIS — E88.810 METABOLIC SYNDROME X: ICD-10-CM

## 2024-07-08 LAB
ERYTHROCYTE [DISTWIDTH] IN BLOOD BY AUTOMATED COUNT: 12.1 % (ref 10–15)
ERYTHROCYTE [SEDIMENTATION RATE] IN BLOOD BY WESTERGREN METHOD: 6 MM/HR (ref 0–20)
HCT VFR BLD AUTO: 47.8 % (ref 40–53)
HGB BLD-MCNC: 16.8 G/DL (ref 13.3–17.7)
MCH RBC QN AUTO: 30.6 PG (ref 26.5–33)
MCHC RBC AUTO-ENTMCNC: 35.1 G/DL (ref 31.5–36.5)
MCV RBC AUTO: 87 FL (ref 78–100)
PLATELET # BLD AUTO: 227 10E3/UL (ref 150–450)
RBC # BLD AUTO: 5.49 10E6/UL (ref 4.4–5.9)
WBC # BLD AUTO: 6.9 10E3/UL (ref 4–11)

## 2024-07-08 PROCEDURE — 86140 C-REACTIVE PROTEIN: CPT | Performed by: FAMILY MEDICINE

## 2024-07-08 PROCEDURE — 99396 PREV VISIT EST AGE 40-64: CPT | Performed by: FAMILY MEDICINE

## 2024-07-08 PROCEDURE — 85027 COMPLETE CBC AUTOMATED: CPT | Performed by: FAMILY MEDICINE

## 2024-07-08 PROCEDURE — 99214 OFFICE O/P EST MOD 30 MIN: CPT | Mod: 25 | Performed by: FAMILY MEDICINE

## 2024-07-08 PROCEDURE — 36415 COLL VENOUS BLD VENIPUNCTURE: CPT | Performed by: FAMILY MEDICINE

## 2024-07-08 PROCEDURE — 86038 ANTINUCLEAR ANTIBODIES: CPT | Performed by: FAMILY MEDICINE

## 2024-07-08 PROCEDURE — 86431 RHEUMATOID FACTOR QUANT: CPT | Performed by: FAMILY MEDICINE

## 2024-07-08 PROCEDURE — 82610 CYSTATIN C: CPT | Performed by: FAMILY MEDICINE

## 2024-07-08 PROCEDURE — 82570 ASSAY OF URINE CREATININE: CPT | Performed by: FAMILY MEDICINE

## 2024-07-08 PROCEDURE — 85652 RBC SED RATE AUTOMATED: CPT | Performed by: FAMILY MEDICINE

## 2024-07-08 PROCEDURE — 80048 BASIC METABOLIC PNL TOTAL CA: CPT | Performed by: FAMILY MEDICINE

## 2024-07-08 PROCEDURE — 80061 LIPID PANEL: CPT | Performed by: FAMILY MEDICINE

## 2024-07-08 PROCEDURE — 82043 UR ALBUMIN QUANTITATIVE: CPT | Performed by: FAMILY MEDICINE

## 2024-07-08 RX ORDER — TADALAFIL 5 MG/1
TABLET ORAL
Qty: 90 TABLET | Refills: 3 | Status: SHIPPED | OUTPATIENT
Start: 2024-07-08

## 2024-07-08 RX ORDER — OLMESARTAN MEDOXOMIL AND HYDROCHLOROTHIAZIDE 40/25 40; 25 MG/1; MG/1
1 TABLET ORAL DAILY
Qty: 90 TABLET | Refills: 3 | Status: SHIPPED | OUTPATIENT
Start: 2024-07-08

## 2024-07-08 RX ORDER — NIACIN 500 MG
TABLET ORAL
COMMUNITY
Start: 2024-01-01

## 2024-07-08 ASSESSMENT — ENCOUNTER SYMPTOMS
DYSURIA: 0
EYE PAIN: 0
PALPITATIONS: 0
ABDOMINAL PAIN: 0
BACK PAIN: 0
ARTHRALGIAS: 0
FEVER: 0
SHORTNESS OF BREATH: 0
HEMATURIA: 0
CHILLS: 0
SORE THROAT: 0
SEIZURES: 0
COLOR CHANGE: 0
COUGH: 0
VOMITING: 0

## 2024-07-08 NOTE — ASSESSMENT & PLAN NOTE
FibroScan completed July 2023 without advanced fibrosis.  Check liver labs today.  Anticipate improvement with GLP-1 receptor agonist.

## 2024-07-08 NOTE — ASSESSMENT & PLAN NOTE
The patient continues to struggle with weight.  He has central adiposity and radiologic evidence of visceral adiposity.  Previously he was quite intolerant to semaglutide with symptoms of diarrhea (constipation?)  And stomach upset.  He is interested in a trial of tirzepatide (Zepbound).  He meets criteria of having a BMI greater than 30 with NAFLD as outlined for insurance coverage through his employer.  [facility] pharmacy referral placed.  Discussed need to maintain protein consumption and muscle mass.

## 2024-07-08 NOTE — ASSESSMENT & PLAN NOTE
Annual exam.  Continues to work nights.  Overall, feels as though he is doing about the same now as he was previously.  He is interested in getting shingles vaccine but not today.

## 2024-07-08 NOTE — PATIENT INSTRUCTIONS
Patient Education   Preventive Care Advice   This is general advice given by our system to help you stay healthy. However, your care team may have specific advice just for you. Please talk to your care team about your preventive care needs.  Nutrition  Eat 5 or more servings of fruits and vegetables each day.  Try wheat bread, brown rice and whole grain pasta (instead of white bread, rice, and pasta).  Get enough calcium and vitamin D. Check the label on foods and aim for 100% of the RDA (recommended daily allowance).  Lifestyle  Exercise at least 150 minutes each week  (30 minutes a day, 5 days a week).  Do muscle strengthening activities 2 days a week. These help control your weight and prevent disease.  No smoking.  Wear sunscreen to prevent skin cancer.  Have a dental exam and cleaning every 6 months.  Yearly exams  See your health care team every year to talk about:  Any changes in your health.  Any medicines your care team has prescribed.  Preventive care, family planning, and ways to prevent chronic diseases.  Shots (vaccines)   HPV shots (up to age 26), if you've never had them before.  Hepatitis B shots (up to age 59), if you've never had them before.  COVID-19 shot: Get this shot when it's due.  Flu shot: Get a flu shot every year.  Tetanus shot: Get a tetanus shot every 10 years.  Pneumococcal, hepatitis A, and RSV shots: Ask your care team if you need these based on your risk.  Shingles shot (for age 50 and up)  General health tests  Diabetes screening:  Starting at age 35, Get screened for diabetes at least every 3 years.  If you are younger than age 35, ask your care team if you should be screened for diabetes.  Cholesterol test: At age 39, start having a cholesterol test every 5 years, or more often if advised.  Bone density scan (DEXA): At age 50, ask your care team if you should have this scan for osteoporosis (brittle bones).  Hepatitis C: Get tested at least once in your life.  STIs (sexually  transmitted infections)  Before age 24: Ask your care team if you should be screened for STIs.  After age 24: Get screened for STIs if you're at risk. You are at risk for STIs (including HIV) if:  You are sexually active with more than one person.  You don't use condoms every time.  You or a partner was diagnosed with a sexually transmitted infection.  If you are at risk for HIV, ask about PrEP medicine to prevent HIV.  Get tested for HIV at least once in your life, whether you are at risk for HIV or not.  Cancer screening tests  Cervical cancer screening: If you have a cervix, begin getting regular cervical cancer screening tests starting at age 21.  Breast cancer scan (mammogram): If you've ever had breasts, begin having regular mammograms starting at age 40. This is a scan to check for breast cancer.  Colon cancer screening: It is important to start screening for colon cancer at age 45.  Have a colonoscopy test every 10 years (or more often if you're at risk) Or, ask your provider about stool tests like a FIT test every year or Cologuard test every 3 years.  To learn more about your testing options, visit:   .  For help making a decision, visit:   https://bit.ly/zc12963.  Prostate cancer screening test: If you have a prostate, ask your care team if a prostate cancer screening test (PSA) at age 55 is right for you.  Lung cancer screening: If you are a current or former smoker ages 50 to 80, ask your care team if ongoing lung cancer screenings are right for you.  For informational purposes only. Not to replace the advice of your health care provider. Copyright   2023 [employer]. All rights reserved. Clinically reviewed by the [facility] Transitions Program. [employer] [id number] - REV 01/24.

## 2024-07-08 NOTE — ASSESSMENT & PLAN NOTE
Blood pressure within normal limits.  Continue olmesartan-hydrochlorothiazide.  Check basic metabolic panel.  Refill sent to pharmacy.

## 2024-07-08 NOTE — PROGRESS NOTES
Preventive Care Visit  [facility]  Devang Sepulveda MD, Family Medicine  Jul 8, 2024      Assessment & Plan   Problem List Items Addressed This Visit       Class 3 severe obesity due to excess calories with serious comorbidity and body mass index (BMI) of 40.0 to 44.9 in adult (H)     The patient continues to struggle with weight.  He has central adiposity and radiologic evidence of visceral adiposity.  Previously he was quite intolerant to semaglutide with symptoms of diarrhea (constipation?)  And stomach upset.  He is interested in a trial of tirzepatide (Zepbound).  He meets criteria of having a BMI greater than 30 with NAFLD as outlined for insurance coverage through his employer.  [facility] pharmacy referral placed.  Discussed need to maintain protein consumption and muscle mass.         Relevant Orders    Med Therapy Management Referral    Erectile dysfunction     Daily dose tadalafil.  Refill sent to [facility] (more cost advantageous).         Relevant Medications    tadalafil (CIALIS) 5 MG tablet    Essential hypertension     Blood pressure within normal limits.  Continue olmesartan-hydrochlorothiazide.  Check basic metabolic panel.  Refill sent to pharmacy.         Relevant Medications    olmesartan-hydrochlorothiazide (BENICAR HCT) 40-25 MG tablet    Hypercholesteremia     Check lipids.  Doing well on Repatha as managed by his cardiologist.         Relevant Orders    Basic metabolic panel  (Ca, Cl, CO2, Creat, Gluc, K, Na, BUN)    Lipid panel reflex to direct LDL Fasting    Metabolic syndrome X    NAFLD (nonalcoholic fatty liver disease)     FibroScan completed July 2023 without advanced fibrosis.  Check liver labs today.  Anticipate improvement with GLP-1 receptor agonist.         Relevant Orders    Med Therapy Management Referral    Routine general medical examination at a health care facility - Primary     Annual exam.  Continues to work nights.  Overall, feels as though he is  "doing about the same now as he was previously.  He is interested in getting shingles vaccine but not today.         Stage 3a chronic kidney disease (H)     Muscular build.  Will check Cystatin C.  Anticipate normal GFR using this metabolite.         Relevant Orders    Albumin Random Urine Quantitative with Creat Ratio    Cystatin C with GFR     Other Visit Diagnoses       Body aches        Relevant Orders    CRP, inflammation    ESR: Erythrocyte sedimentation rate (Completed)    Rheumatoid factor    Anti Nuclear Isela IgG by IFA with Reflex    CBC with platelets (Completed)             Patient has been advised of split billing requirements and indicates understanding: Yes       BMI  Estimated body mass index is 42.12 kg/m  as calculated from the following:    Height as of this encounter: 1.753 m (5' 9\").    Weight as of this encounter: 129.4 kg (285 lb 3.2 oz).   Weight management plan: Discussed healthy diet and exercise guidelines    Counseling  Appropriate preventive services were discussed with this patient, including applicable screening as appropriate for fall prevention, nutrition, physical activity, Tobacco-use cessation, weight loss and cognition.  Checklist reviewing preventive services available has been given to the patient.  Reviewed patient's diet, addressing concerns and/or questions.   He is at risk for lack of exercise and has been provided with information to increase physical activity for the benefit of his well-being.   He is at risk for psychosocial distress and has been provided with information to reduce risk.     Clive Saha is a 48 year old, presenting for the following:  Physical (Fasting )        7/8/2024    11:07 AM   Additional Questions   Roomed by xl        Health Care Directive  Patient does not have a Health Care Directive or Living Will: Discussed advance care planning with patient; however, patient declined at this time.    Joint pain: better since stopping omeprazole.  Tums is " helpful.      Weight loss:   - wonders about tirzapetitde .  Did not toleralate wegovy in the past.    Body pain: wonders about autoimmune           7/3/2024   General Health   How would you rate your overall physical health? (!) FAIR   Feel stress (tense, anxious, or unable to sleep) Only a little      (!) STRESS CONCERN      7/3/2024   Nutrition   Three or more servings of calcium each day? Yes   Diet: Low salt    Gluten-free/reduced   How many servings of fruit and vegetables per day? (!) 2-3   How many sweetened beverages each day? 0-1       Multiple values from one day are sorted in reverse-chronological order         7/3/2024   Exercise   Days per week of moderate/strenous exercise 3 days   Average minutes spent exercising at this level 30 min            7/3/2024   Social Factors   Frequency of gathering with friends or relatives Once a week   Worry food won't last until get money to buy more No   Food not last or not have enough money for food? No   Do you have housing? (Housing is defined as stable permanent housing and does not include staying ouside in a car, in a tent, in an abandoned building, in an overnight shelter, or couch-surfing.) Yes   Are you worried about losing your housing? No   Lack of transportation? No   Unable to get utilities (heat,electricity)? No            7/3/2024   Fall Risk   Fallen 2 or more times in the past year? No   Trouble with walking or balance? No             7/3/2024   Dental   Dentist two times every year? Yes            7/3/2024   TB Screening   Were you born outside of the [place]? No            Today's PHQ-2 Score:       7/7/2024    11:43 AM   PHQ-2 ( 1999 [employer])   Q1: Little interest or pleasure in doing things 0   Q2: Feeling down, depressed or hopeless 0   PHQ-2 Score 0   Q1: Little interest or pleasure in doing things Not at all   Q2: Feeling down, depressed or hopeless Not at all   PHQ-2 Score 0           7/3/2024   Substance Use   Alcohol more than 3/day or more than  "7/wk No   Do you use any other substances recreationally? No        Social History     Tobacco Use    Smoking status: Former     Current packs/day: 0.00     Types: Cigarettes, Clove cigarettes or kreteks     Start date: 1994     Quit date: 1995     Years since quittin.2    Smokeless tobacco: Never    Tobacco comments:     in college   Vaping Use    Vaping status: Never Used   Substance Use Topics    Alcohol use: Yes     Comment: Alcoholic Drinks/day: 2 drinks per month     Drug use: Never           7/3/2024   STI Screening   New sexual partner(s) since last STI/HIV test? No      ASCVD Risk   The ASCVD Risk score (Kristina LINN, et al., 2019) failed to calculate for the following reasons:    The valid total cholesterol range is 130 to 320 mg/dL        7/3/2024   Contraception/Family Planning   Questions about contraception or family planning No           Reviewed and updated as needed this visit by Provider   Tobacco  Allergies  Meds  Problems  Med Hx  Surg Hx  Fam Hx          Review of Systems   Constitutional:  Negative for chills and fever.   HENT:  Negative for ear pain and sore throat.    Eyes:  Negative for pain and visual disturbance.   Respiratory:  Negative for cough and shortness of breath.    Cardiovascular:  Negative for chest pain and palpitations.   Gastrointestinal:  Negative for abdominal pain and vomiting.   Genitourinary:  Negative for dysuria and hematuria.   Musculoskeletal:  Negative for arthralgias and back pain.   Skin:  Negative for color change and rash.   Neurological:  Negative for seizures and syncope.   All other systems reviewed and are negative.         Objective    Exam  /79 (BP Location: Left arm, Patient Position: Sitting, Cuff Size: Adult Large)   Pulse 77   Temp 98.4  F (36.9  C) (Oral)   Resp 16   Ht 1.753 m (5' 9\")   Wt 129.4 kg (285 lb 3.2 oz)   SpO2 95%   BMI 42.12 kg/m     Estimated body mass index is 42.12 kg/m  as calculated from the " "following:    Height as of this encounter: 1.753 m (5' 9\").    Weight as of this encounter: 129.4 kg (285 lb 3.2 oz).    Physical Exam  Vitals reviewed.   Constitutional:       General: He is not in acute distress.     Appearance: Normal appearance. He is not ill-appearing.   HENT:      Head: Normocephalic and atraumatic.      Right Ear: External ear normal.      Left Ear: External ear normal.      Nose: Nose normal.      Mouth/Throat:      Pharynx: Oropharynx is clear. No oropharyngeal exudate or posterior oropharyngeal erythema.   Eyes:      General: No scleral icterus.        Right eye: No discharge.         Left eye: No discharge.      Extraocular Movements: Extraocular movements intact.      Conjunctiva/sclera: Conjunctivae normal.      Pupils: Pupils are equal, round, and reactive to light.   Neck:      Comments: No thyromegaly.  Cardiovascular:      Rate and Rhythm: Normal rate and regular rhythm.      Heart sounds: Normal heart sounds. No murmur heard.     No friction rub. No gallop.   Pulmonary:      Effort: Pulmonary effort is normal. No respiratory distress.      Breath sounds: Normal breath sounds. No wheezing or rales.   Abdominal:      General: There is no distension.      Palpations: Abdomen is soft. There is no mass.      Tenderness: There is no abdominal tenderness.   Musculoskeletal:         General: No signs of injury. Normal range of motion.      Cervical back: Normal range of motion.      Right lower leg: No edema.      Left lower leg: No edema.   Lymphadenopathy:      Cervical: No cervical adenopathy.   Skin:     General: Skin is warm.      Coloration: Skin is not jaundiced.      Findings: No rash.   Neurological:      General: No focal deficit present.      Mental Status: He is alert and oriented to person, place, and time.      Cranial Nerves: No cranial nerve deficit.      Deep Tendon Reflexes: Reflexes normal.   Psychiatric:         Mood and Affect: Mood normal.         Signed " Electronically by: Devang Sepulveda MD

## 2024-07-09 LAB
ANA SER QL IF: NEGATIVE
ANION GAP SERPL CALCULATED.3IONS-SCNC: 9 MMOL/L (ref 7–15)
BUN SERPL-MCNC: 20.4 MG/DL (ref 6–20)
CALCIUM SERPL-MCNC: 9.6 MG/DL (ref 8.6–10)
CHLORIDE SERPL-SCNC: 102 MMOL/L (ref 98–107)
CHOLEST SERPL-MCNC: 162 MG/DL
CREAT SERPL-MCNC: 1.15 MG/DL (ref 0.67–1.17)
CREAT UR-MCNC: 233 MG/DL
CRP SERPL-MCNC: <3 MG/L
CYSTATIN C (ROCHE): 1 MG/L (ref 0.6–1)
DEPRECATED HCO3 PLAS-SCNC: 27 MMOL/L (ref 22–29)
EGFRCR SERPLBLD CKD-EPI 2021: 78 ML/MIN/1.73M2
FASTING STATUS PATIENT QL REPORTED: YES
FASTING STATUS PATIENT QL REPORTED: YES
GFR/BSA.PRED SERPLBLD CYS-BASED-ARV: 81 ML/MIN/1.73M2
GLUCOSE SERPL-MCNC: 107 MG/DL (ref 70–99)
HDLC SERPL-MCNC: 37 MG/DL
LDLC SERPL CALC-MCNC: 89 MG/DL
MICROALBUMIN UR-MCNC: 13 MG/L
MICROALBUMIN/CREAT UR: 5.58 MG/G CR (ref 0–17)
NONHDLC SERPL-MCNC: 125 MG/DL
POTASSIUM SERPL-SCNC: 4.4 MMOL/L (ref 3.4–5.3)
RHEUMATOID FACT SERPL-ACNC: 122 IU/ML
SODIUM SERPL-SCNC: 138 MMOL/L (ref 135–145)
TRIGL SERPL-MCNC: 179 MG/DL

## 2024-07-09 NOTE — TELEPHONE ENCOUNTER
[facility] Prior Authorization Team   Phone: [phone]    PRIOR AUTHORIZATION DENIED    Medication: TADALAFIL 5 MG PO TABS  Insurance Company: [employer] - Phone [phone] Fax [phone]  Denial Date: 7/8/2024  Denial Reason(s): MUST TRY/FAIL GENERIC SILDENAFIL      Appeal Information: IF THE PROVIDER WOULD LIKE TO APPEAL THIS DECISION PLEASE PROVIDE THE PA TEAM WITH A LETTER OF MEDICAL NECESSITY      Patient Notified: NO

## 2024-07-19 DIAGNOSIS — Z78.9 STATIN INTOLERANCE: ICD-10-CM

## 2024-07-19 DIAGNOSIS — E88.810 METABOLIC SYNDROME X: ICD-10-CM

## 2024-07-19 RX ORDER — EVOLOCUMAB 140 MG/ML
INJECTION, SOLUTION SUBCUTANEOUS
Qty: 2 ML | Refills: 1 | Status: SHIPPED | OUTPATIENT
Start: 2024-07-19

## 2024-07-22 ENCOUNTER — TRANSFERRED RECORDS (OUTPATIENT)
Dept: HEALTH INFORMATION MANAGEMENT | Facility: CLINIC | Age: 49
End: 2024-07-22
Payer: COMMERCIAL

## 2024-07-30 ENCOUNTER — ALLIED HEALTH/NURSE VISIT (OUTPATIENT)
Dept: FAMILY MEDICINE | Facility: CLINIC | Age: 49
End: 2024-07-30
Payer: COMMERCIAL

## 2024-07-30 VITALS — TEMPERATURE: 98.1 F

## 2024-07-30 DIAGNOSIS — Z23 NEED FOR SHINGLES VACCINE: Primary | ICD-10-CM

## 2024-07-30 PROCEDURE — 90750 HZV VACC RECOMBINANT IM: CPT

## 2024-07-30 PROCEDURE — 90471 IMMUNIZATION ADMIN: CPT

## 2024-07-30 PROCEDURE — 99207 PR NO CHARGE NURSE ONLY: CPT

## 2024-07-30 NOTE — PROGRESS NOTES
Prior to immunization administration, verified patients identity using patient s name and date of birth. Please see Immunization Activity for additional information.     Screening Questionnaire for Adult Immunization    Are you sick today?   No   Do you have allergies to medications, food, a vaccine component or latex?   No   Have you ever had a serious reaction after receiving a vaccination?   No   Do you have a long-term health problem with heart, lung, kidney, or metabolic disease (e.g., diabetes), asthma, a blood disorder, no spleen, complement component deficiency, a cochlear implant, or a spinal fluid leak?  Are you on long-term aspirin therapy?   No   Do you have cancer, leukemia, HIV/AIDS, or any other immune system problem?   No   Do you have a parent, brother, or sister with an immune system problem?   No   In the past 3 months, have you taken medications that affect  your immune system, such as prednisone, other steroids, or anticancer drugs; drugs for the treatment of rheumatoid arthritis, Crohn s disease, or psoriasis; or have you had radiation treatments?   No   Have you had a seizure, or a brain or other nervous system problem?   No   During the past year, have you received a transfusion of blood or blood    products, or been given immune (gamma) globulin or antiviral drug?   No   For women: Are you pregnant or is there a chance you could become       pregnant during the next month?   No   Have you received any vaccinations in the past 4 weeks?   No     Immunization questionnaire answers were all negative.    I have reviewed the following standing orders:   This patient is due and qualifies for the Zoster vaccine.    Click here for Zoster Standing Order    I have reviewed the vaccines inclusion and exclusion criteria; No concerns regarding eligibility. 1    Patient instructed to remain in clinic for 15 minutes afterwards, and to report any adverse reactions.     Screening performed by Ale Dejesus  RN on 7/30/2024 at 9:04 AM.

## 2024-08-01 ENCOUNTER — TRANSFERRED RECORDS (OUTPATIENT)
Dept: HEALTH INFORMATION MANAGEMENT | Facility: CLINIC | Age: 49
End: 2024-08-01
Payer: COMMERCIAL

## 2024-08-26 ENCOUNTER — TRANSFERRED RECORDS (OUTPATIENT)
Dept: HEALTH INFORMATION MANAGEMENT | Facility: CLINIC | Age: 49
End: 2024-08-26
Payer: COMMERCIAL

## 2024-09-23 NOTE — PROGRESS NOTES
HEART CARE ENCOUNTER CONSULTATON NOTE      [facility]  [phone]      Assessment/Recommendations   Assessment:   Coronary artery disease: CT coronary calcium score 68 (LAD/RCA)  Hypertension: Elevated 153/88, 140/86 on Olmesartan-HCTZ.  Better control at home and at recent clinic visit.  Hyperlipidemia: LDL 89 on Repatha and niacin, history of statin intolerance.  Previously well-controlled prior to discontinuation of Zetia.  CKD  Rheumatoid arthritis: Recent diagnosis, following with rheumatologist/[facility] orthopedics      Plan:   Restart Zetia 10 mg daily. LDL goal less than 70.  No apparent interaction with Plaquenil therapy.  Continue Repatha and niacin  Continue to monitor blood pressure  Discussed signs and symptoms of CAD progression to monitor for report      Follow up in 1 year or sooner as needed     History of Present Illness/Subjective    HPI: Yifan Quesada is a 48 year old male with PMHx of CAD, HTN, HLD, CKD, rheumatoid arthritis presents for follow-up.    Patient reports doing well taking Repatha without issue.  His blood pressure is controlled when he monitors it at work.  He noticed that his cholesterol increase on recent measurements since discontinuation of Zetia.  Exercise capability has been limited for the last couple years due to joint pain, and was recently diagnosed with rheumatoid arthritis.  Started on Plaquenil.  Has joint pain in hands and ankles with associated inflammatory swelling.     He denies lightheadedness, shortness of breath, dyspnea on exertion, orthopnea, PND, palpitations, and chest pain.      CT coronary calcium score 7/2023 results:    The total Agatston score is 68. A calcium score in this range places the individual in the 87th percentile when compared to an age and gender matched control group.    Radiology review for incidental non cardiac findings will be under separate report by the radiologist.        Physical Examination  Review of  "Systems   Vitals: BP (!) 153/88 (BP Location: Right arm, Patient Position: Sitting, Cuff Size: Adult Large)   Pulse 80   Ht 1.753 m (5' 9\")   Wt 129.3 kg (285 lb)   SpO2 97%   BMI 42.09 kg/m    BMI= Body mass index is 42.09 kg/m .  Wt Readings from Last 3 Encounters:   09/24/24 129.3 kg (285 lb)   07/08/24 129.4 kg (285 lb 3.2 oz)   08/17/23 126.1 kg (278 lb 1.6 oz)           ENT/Mouth: membranes moist, no oral lesions or bleeding gums.      EYES:  no scleral icterus, normal conjunctivae       Chest/Lungs:   lungs are clear to auscultation, no rales or wheezing, equal chest wall expansion    Cardiovascular:   Regular. Normal first and second heart sounds with no murmurs, rubs, or gallops; the radial and posterior tibial pulses are intact, absent edema bilaterally        Extremities: no cyanosis or clubbing   Skin: no xanthelasma, warm.    Neurologic:  no tremors     Psychiatric: alert and oriented x3, calm        Please refer above for cardiac ROS details.        Medical History  Surgical History Family History Social History   Past Medical History:   Diagnosis Date    ED (erectile dysfunction)     GERD (gastroesophageal reflux disease)     Hypertension     Metabolic syndrome     Tendonitis, bicipital     bilateral    White coat hypertension      Past Surgical History:   Procedure Laterality Date    .nasal fracture repair      NASAL FRACTURE SURGERY       Family History   Problem Relation Age of Onset    Other Cancer Mother         Lung cancer    Cancer Mother         lung     Depression Mother     Schizophrenia Mother     Other - See Comments Mother         low blood sugars     Snoring Mother     Polycythemia Mother     Mental Illness Mother     Hypertension Mother     Coronary Artery Disease Father 48    Snoring Father     Hypertension Father     Obesity Father     Hypertension Sister     Depression Sister     Obesity Sister     Heart Failure Maternal Grandmother     Hypertension Maternal Grandfather     " Hypertension Paternal Grandmother     Heart Failure Paternal Grandmother     Breast Cancer Paternal Grandmother     Diabetes Paternal Grandmother     Obesity Paternal Grandmother     Prostate Cancer Paternal Grandfather     Hypertension Paternal Grandfather     Obesity Paternal Grandfather     Colon Cancer No family hx of     Thyroid Cancer No family hx of         Social History     Socioeconomic History    Marital status: Single     Spouse name: Not on file    Number of children: Not on file    Years of education: Not on file    Highest education level: Not on file   Occupational History    Occupation: ICU nurse     Employer: [employer]   Tobacco Use    Smoking status: Former     Current packs/day: 0.00     Types: Cigarettes, Clove cigarettes or kreteks     Start date: 1994     Quit date: 1995     Years since quittin.5    Smokeless tobacco: Never    Tobacco comments:     in college   Vaping Use    Vaping status: Never Used   Substance and Sexual Activity    Alcohol use: Yes     Comment: Alcoholic Drinks/day: 2 drinks per month     Drug use: Never    Sexual activity: Yes     Partners: Female     Birth control/protection: I.U.D.   Other Topics Concern    Parent/sibling w/ CABG, MI or angioplasty before 65F 55M? Yes   Social History Narrative    Not on file     Social Determinants of Health     Financial Resource Strain: Low Risk  (7/3/2024)    Financial Resource Strain     Within the past 12 months, have you or your family members you live with been unable to get utilities (heat, electricity) when it was really needed?: No   Food Insecurity: Low Risk  (7/3/2024)    Food Insecurity     Within the past 12 months, did you worry that your food would run out before you got money to buy more?: No     Within the past 12 months, did the food you bought just not last and you didn t have money to get more?: No   Transportation Needs: Low Risk  (7/3/2024)    Transportation Needs     Within the past 12 months, has  lack of transportation kept you from medical appointments, getting your medicines, non-medical meetings or appointments, work, or from getting things that you need?: No   Physical Activity: Insufficiently Active (7/3/2024)    Exercise Vital Sign     Days of Exercise per Week: 3 days     Minutes of Exercise per Session: 30 min   Stress: No Stress Concern Present (7/3/2024)    [employer] - Occupational Stress Questionnaire     Feeling of Stress : Only a little   Social Connections: Unknown (7/3/2024)    Social Connection and Isolation Panel [NHANES]     Frequency of Communication with Friends and Family: Not on file     Frequency of Social Gatherings with Friends and Family: Once a week     Attends Quaker Services: Not on file     Active Member of Clubs or Organizations: Not on file     Attends Club or Organization Meetings: Not on file     Marital Status: Not on file   Interpersonal Safety: Low Risk  (7/8/2024)    Interpersonal Safety     Do you feel physically and emotionally safe where you currently live?: Yes     Within the past 12 months, have you been hit, slapped, kicked or otherwise physically hurt by someone?: No     Within the past 12 months, have you been humiliated or emotionally abused in other ways by your partner or ex-partner?: No   Housing Stability: Low Risk  (7/3/2024)    Housing Stability     Do you have housing? : Yes     Are you worried about losing your housing?: No           Medications  Allergies   Current Outpatient Medications   Medication Sig Dispense Refill    evolocumab (REPATHA SURECLICK) 140 MG/ML prefilled autoinjector ADMINISTER 1 ML(140 MG) UNDER THE SKIN EVERY 14 DAYS. 2 mL 1    ibuprofen (ADVIL/MOTRIN) 200 MG capsule Take 1 capsule by mouth every 6 hours prn      MULTIPLE VITAMIN PO Take by mouth daily      niacin 500 MG tablet       olmesartan-hydrochlorothiazide (BENICAR HCT) 40-25 MG tablet Take 1 tablet by mouth daily 90 tablet 3    omeprazole  "(PRILOSEC) 20 MG DR capsule TAKE 1 CAPSULE(20 MG) BY MOUTH DAILY 90 capsule 0    Psyllium (METAMUCIL PO) METAMUCIL GUMMIES      tadalafil (CIALIS) 5 MG tablet TAKE 1/2 TO 1 TABLET BY MOUTH DAILY 90 tablet 3       Allergies   Allergen Reactions    Statins Muscle Pain (Myalgia)     Severe symptoms while on crestor (statin)    Famotidine Cramps and Muscle Pain (Myalgia)    Cefazolin Rash    Ciprofloxacin Rash    Clavulanic Acid Rash    Doxycycline Rash    Levofloxacin Rash    Penicillins Rash    Sulfamethizole Rash    Zithromax [Azithromycin] Rash          Lab Results    Chemistry/lipid CBC Cardiac Enzymes/BNP/TSH/INR   Recent Labs   Lab Test 07/08/24  1154   CHOL 162   HDL 37*   LDL 89   TRIG 179*     Recent Labs   Lab Test 07/08/24  1154 12/11/23  1315 06/30/23  1144   LDL 89 38 173*     Recent Labs   Lab Test 07/08/24  1154      POTASSIUM 4.4   CHLORIDE 102   CO2 27   *   BUN 20.4*   CR 1.15   GFRESTIMATED 78   CURRY 9.6     Recent Labs   Lab Test 07/08/24  1154 06/30/23  1144 08/09/22  1010   CR 1.15 1.33* 1.25*     Recent Labs   Lab Test 04/29/22  1109 07/19/21  0906 10/21/20  0754   A1C 5.5 5.4 5.6          Recent Labs   Lab Test 07/08/24  1154   WBC 6.9   HGB 16.8   HCT 47.8   MCV 87        Recent Labs   Lab Test 07/08/24  1154 06/30/23  1144 08/09/22  1010   HGB 16.8 16.2 15.8    No results for input(s): \"TROPONINI\" in the last 38968 hours.  No results for input(s): \"BNP\", \"NTBNPI\", \"NTBNP\" in the last 77842 hours.  Recent Labs   Lab Test 04/29/22  1109   TSH 1.86     No results for input(s): \"INR\" in the last 17136 hours.       This note has been dictated using voice recognition software. Any grammatical, typographical, or context distortions are unintentional and inherent to the software    Coleen Vuong PA-C                                       "

## 2024-09-24 ENCOUNTER — OFFICE VISIT (OUTPATIENT)
Dept: CARDIOLOGY | Facility: CLINIC | Age: 49
End: 2024-09-24
Payer: COMMERCIAL

## 2024-09-24 VITALS
WEIGHT: 285 LBS | OXYGEN SATURATION: 97 % | SYSTOLIC BLOOD PRESSURE: 140 MMHG | HEIGHT: 69 IN | DIASTOLIC BLOOD PRESSURE: 66 MMHG | BODY MASS INDEX: 42.21 KG/M2 | HEART RATE: 80 BPM

## 2024-09-24 DIAGNOSIS — I10 ESSENTIAL HYPERTENSION: ICD-10-CM

## 2024-09-24 DIAGNOSIS — E78.5 DYSLIPIDEMIA, GOAL LDL BELOW 70: Primary | ICD-10-CM

## 2024-09-24 DIAGNOSIS — R93.1 ELEVATED CORONARY ARTERY CALCIUM SCORE: ICD-10-CM

## 2024-09-24 PROCEDURE — G2211 COMPLEX E/M VISIT ADD ON: HCPCS

## 2024-09-24 PROCEDURE — 99214 OFFICE O/P EST MOD 30 MIN: CPT

## 2024-09-24 RX ORDER — EZETIMIBE 10 MG/1
10 TABLET ORAL DAILY
Qty: 90 TABLET | Refills: 3 | Status: SHIPPED | OUTPATIENT
Start: 2024-09-24

## 2024-09-24 NOTE — PATIENT INSTRUCTIONS
It was a pleasure taking part in your care today:    - Restart Zetia 10 mg along with Repatha  - Repeat lipid panel in 3-6 months  - Follow up in 1 year      Please call the [facility] clinic with any questions or concerns at [phone].     Coleen Vuong PA-C

## 2024-09-24 NOTE — LETTER
9/24/2024    Devang Sepulveda MD  [address]  [facility] [address]    RE: Yifan Quesada       Dear Colleague,     I had the pleasure of seeing Yifan Quesada in the [facility].    HEART CARE ENCOUNTER CONSULTATON NOTE      [facility]  [phone]      Assessment/Recommendations   Assessment:   Coronary artery disease: CT coronary calcium score 68 (LAD/RCA)  Hypertension: Elevated 153/88, 140/86 on Olmesartan-HCTZ.  Better control at home and at recent clinic visit.  Hyperlipidemia: LDL 89 on Repatha and niacin, history of statin intolerance.  Previously well-controlled prior to discontinuation of Zetia.  CKD  Rheumatoid arthritis: Recent diagnosis, following with rheumatologist/[facility] orthopedics      Plan:   Restart Zetia 10 mg daily. LDL goal less than 70.  No apparent interaction with Plaquenil therapy.  Continue Repatha and niacin  Continue to monitor blood pressure  Discussed signs and symptoms of CAD progression to monitor for report      Follow up in 1 year or sooner as needed     History of Present Illness/Subjective    HPI: Yifan Quesada is a 48 year old male with PMHx of CAD, HTN, HLD, CKD, rheumatoid arthritis presents for follow-up.    Patient reports doing well taking Repatha without issue.  His blood pressure is controlled when he monitors it at work.  He noticed that his cholesterol increase on recent measurements since discontinuation of Zetia.  Exercise capability has been limited for the last couple years due to joint pain, and was recently diagnosed with rheumatoid arthritis.  Started on Plaquenil.  Has joint pain in hands and ankles with associated inflammatory swelling.     He denies lightheadedness, shortness of breath, dyspnea on exertion, orthopnea, PND, palpitations, and chest pain.      CT coronary calcium score 7/2023 results:     The total Agatston score is 68. A calcium score in this range places the individual in the 87th percentile  "when compared to an age and gender matched control group.     Radiology review for incidental non cardiac findings will be under separate report by the radiologist.        Physical Examination  Review of Systems   Vitals: BP (!) 153/88 (BP Location: Right arm, Patient Position: Sitting, Cuff Size: Adult Large)   Pulse 80   Ht 1.753 m (5' 9\")   Wt 129.3 kg (285 lb)   SpO2 97%   BMI 42.09 kg/m    BMI= Body mass index is 42.09 kg/m .  Wt Readings from Last 3 Encounters:   09/24/24 129.3 kg (285 lb)   07/08/24 129.4 kg (285 lb 3.2 oz)   08/17/23 126.1 kg (278 lb 1.6 oz)           ENT/Mouth: membranes moist, no oral lesions or bleeding gums.      EYES:  no scleral icterus, normal conjunctivae       Chest/Lungs:   lungs are clear to auscultation, no rales or wheezing, equal chest wall expansion    Cardiovascular:   Regular. Normal first and second heart sounds with no murmurs, rubs, or gallops; the radial and posterior tibial pulses are intact, absent edema bilaterally        Extremities: no cyanosis or clubbing   Skin: no xanthelasma, warm.    Neurologic:  no tremors     Psychiatric: alert and oriented x3, calm        Please refer above for cardiac ROS details.        Medical History  Surgical History Family History Social History   Past Medical History:   Diagnosis Date     ED (erectile dysfunction)      GERD (gastroesophageal reflux disease)      Hypertension      Metabolic syndrome      Tendonitis, bicipital     bilateral     White coat hypertension      Past Surgical History:   Procedure Laterality Date     .nasal fracture repair       NASAL FRACTURE SURGERY       Family History   Problem Relation Age of Onset     Other Cancer Mother         Lung cancer     Cancer Mother         lung      Depression Mother      Schizophrenia Mother      Other - See Comments Mother         low blood sugars      Snoring Mother      Polycythemia Mother      Mental Illness Mother      Hypertension Mother      Coronary Artery Disease " Father 48     Snoring Father      Hypertension Father      Obesity Father      Hypertension Sister      Depression Sister      Obesity Sister      Heart Failure Maternal Grandmother      Hypertension Maternal Grandfather      Hypertension Paternal Grandmother      Heart Failure Paternal Grandmother      Breast Cancer Paternal Grandmother      Diabetes Paternal Grandmother      Obesity Paternal Grandmother      Prostate Cancer Paternal Grandfather      Hypertension Paternal Grandfather      Obesity Paternal Grandfather      Colon Cancer No family hx of      Thyroid Cancer No family hx of         Social History     Socioeconomic History     Marital status: Single     Spouse name: Not on file     Number of children: Not on file     Years of education: Not on file     Highest education level: Not on file   Occupational History     Occupation: ICU nurse     Employer: [employer]   Tobacco Use     Smoking status: Former     Current packs/day: 0.00     Types: Cigarettes, Clove cigarettes or kreteks     Start date: 1994     Quit date: 1995     Years since quittin.5     Smokeless tobacco: Never     Tobacco comments:     in college   Vaping Use     Vaping status: Never Used   Substance and Sexual Activity     Alcohol use: Yes     Comment: Alcoholic Drinks/day: 2 drinks per month      Drug use: Never     Sexual activity: Yes     Partners: Female     Birth control/protection: I.U.D.   Other Topics Concern     Parent/sibling w/ CABG, MI or angioplasty before 65F 55M? Yes   Social History Narrative     Not on file     Social Determinants of Health     Financial Resource Strain: Low Risk  (7/3/2024)    Financial Resource Strain      Within the past 12 months, have you or your family members you live with been unable to get utilities (heat, electricity) when it was really needed?: No   Food Insecurity: Low Risk  (7/3/2024)    Food Insecurity      Within the past 12 months, did you worry that your food would run out  before you got money to buy more?: No      Within the past 12 months, did the food you bought just not last and you didn t have money to get more?: No   Transportation Needs: Low Risk  (7/3/2024)    Transportation Needs      Within the past 12 months, has lack of transportation kept you from medical appointments, getting your medicines, non-medical meetings or appointments, work, or from getting things that you need?: No   Physical Activity: Insufficiently Active (7/3/2024)    Exercise Vital Sign      Days of Exercise per Week: 3 days      Minutes of Exercise per Session: 30 min   Stress: No Stress Concern Present (7/3/2024)    [employer] [place] [employer] - Occupational Stress Questionnaire      Feeling of Stress : Only a little   Social Connections: Unknown (7/3/2024)    Social Connection and Isolation Panel [NHANES]      Frequency of Communication with Friends and Family: Not on file      Frequency of Social Gatherings with Friends and Family: Once a week      Attends Hoahaoism Services: Not on file      Active Member of Clubs or Organizations: Not on file      Attends Club or Organization Meetings: Not on file      Marital Status: Not on file   Interpersonal Safety: Low Risk  (7/8/2024)    Interpersonal Safety      Do you feel physically and emotionally safe where you currently live?: Yes      Within the past 12 months, have you been hit, slapped, kicked or otherwise physically hurt by someone?: No      Within the past 12 months, have you been humiliated or emotionally abused in other ways by your partner or ex-partner?: No   Housing Stability: Low Risk  (7/3/2024)    Housing Stability      Do you have housing? : Yes      Are you worried about losing your housing?: No           Medications  Allergies   Current Outpatient Medications   Medication Sig Dispense Refill     evolocumab (REPATHA SURECLICK) 140 MG/ML prefilled autoinjector ADMINISTER 1 ML(140 MG) UNDER THE SKIN EVERY 14 DAYS. 2 mL 1      "ibuprofen (ADVIL/MOTRIN) 200 MG capsule Take 1 capsule by mouth every 6 hours prn       MULTIPLE VITAMIN PO Take by mouth daily       niacin 500 MG tablet        olmesartan-hydrochlorothiazide (BENICAR HCT) 40-25 MG tablet Take 1 tablet by mouth daily 90 tablet 3     omeprazole (PRILOSEC) 20 MG DR capsule TAKE 1 CAPSULE(20 MG) BY MOUTH DAILY 90 capsule 0     Psyllium (METAMUCIL PO) METAMUCIL GUMMIES       tadalafil (CIALIS) 5 MG tablet TAKE 1/2 TO 1 TABLET BY MOUTH DAILY 90 tablet 3       Allergies   Allergen Reactions     Statins Muscle Pain (Myalgia)     Severe symptoms while on crestor (statin)     Famotidine Cramps and Muscle Pain (Myalgia)     Cefazolin Rash     Ciprofloxacin Rash     Clavulanic Acid Rash     Doxycycline Rash     Levofloxacin Rash     Penicillins Rash     Sulfamethizole Rash     Zithromax [Azithromycin] Rash          Lab Results    Chemistry/lipid CBC Cardiac Enzymes/BNP/TSH/INR   Recent Labs   Lab Test 07/08/24  1154   CHOL 162   HDL 37*   LDL 89   TRIG 179*     Recent Labs   Lab Test 07/08/24  1154 12/11/23  1315 06/30/23  1144   LDL 89 38 173*     Recent Labs   Lab Test 07/08/24  1154      POTASSIUM 4.4   CHLORIDE 102   CO2 27   *   BUN 20.4*   CR 1.15   GFRESTIMATED 78   CURRY 9.6     Recent Labs   Lab Test 07/08/24  1154 06/30/23  1144 08/09/22  1010   CR 1.15 1.33* 1.25*     Recent Labs   Lab Test 04/29/22  1109 07/19/21  0906 10/21/20  0754   A1C 5.5 5.4 5.6          Recent Labs   Lab Test 07/08/24  1154   WBC 6.9   HGB 16.8   HCT 47.8   MCV 87        Recent Labs   Lab Test 07/08/24  1154 06/30/23  1144 08/09/22  1010   HGB 16.8 16.2 15.8    No results for input(s): \"TROPONINI\" in the last 40620 hours.  No results for input(s): \"BNP\", \"NTBNPI\", \"NTBNP\" in the last 24969 hours.  Recent Labs   Lab Test 04/29/22  1109   TSH 1.86     No results for input(s): \"INR\" in the last 68156 hours.       This note has been dictated using voice recognition software. Any grammatical, " typographical, or context distortions are unintentional and inherent to the software    Coleen Vuong PA-C                                         Thank you for allowing me to participate in the care of your patient.      Sincerely,     Coleen Byrd PA-C     [facility]  cc:   Coleen Vuong PA-C  [address]  [place] [address]

## 2024-09-27 ENCOUNTER — TELEPHONE (OUTPATIENT)
Dept: FAMILY MEDICINE | Facility: CLINIC | Age: 49
End: 2024-09-27
Payer: COMMERCIAL

## 2024-09-27 NOTE — TELEPHONE ENCOUNTER
Patient Quality Outreach    Patient is due for the following:   Hypertension -  BP check    Next Steps:   Patient has upcoming appointment, these items will be addressed at that time.    Type of outreach:    Chart review performed, no outreach needed.      Questions for provider review:    None           Leona Dang MA

## 2024-09-30 ENCOUNTER — TELEPHONE (OUTPATIENT)
Dept: ENDOCRINOLOGY | Facility: CLINIC | Age: 49
End: 2024-09-30
Payer: COMMERCIAL

## 2024-09-30 ENCOUNTER — MYC MEDICAL ADVICE (OUTPATIENT)
Dept: CARDIOLOGY | Facility: CLINIC | Age: 49
End: 2024-09-30
Payer: COMMERCIAL

## 2024-09-30 ENCOUNTER — VIRTUAL VISIT (OUTPATIENT)
Dept: CARDIOLOGY | Facility: CLINIC | Age: 49
End: 2024-09-30
Attending: FAMILY MEDICINE
Payer: COMMERCIAL

## 2024-09-30 VITALS — WEIGHT: 280 LBS | BODY MASS INDEX: 41.47 KG/M2 | HEIGHT: 69 IN

## 2024-09-30 DIAGNOSIS — I10 ESSENTIAL HYPERTENSION: ICD-10-CM

## 2024-09-30 DIAGNOSIS — E78.5 DYSLIPIDEMIA, GOAL LDL BELOW 70: ICD-10-CM

## 2024-09-30 DIAGNOSIS — K76.0 NAFLD (NONALCOHOLIC FATTY LIVER DISEASE): ICD-10-CM

## 2024-09-30 DIAGNOSIS — E66.01 CLASS 3 SEVERE OBESITY DUE TO EXCESS CALORIES WITH SERIOUS COMORBIDITY AND BODY MASS INDEX (BMI) OF 40.0 TO 44.9 IN ADULT (H): Primary | ICD-10-CM

## 2024-09-30 DIAGNOSIS — R93.1 ELEVATED CORONARY ARTERY CALCIUM SCORE: ICD-10-CM

## 2024-09-30 DIAGNOSIS — M05.742 RHEUMATOID ARTHRITIS INVOLVING BOTH HANDS WITH POSITIVE RHEUMATOID FACTOR (H): ICD-10-CM

## 2024-09-30 DIAGNOSIS — E66.813 CLASS 3 SEVERE OBESITY DUE TO EXCESS CALORIES WITH SERIOUS COMORBIDITY AND BODY MASS INDEX (BMI) OF 40.0 TO 44.9 IN ADULT (H): Primary | ICD-10-CM

## 2024-09-30 DIAGNOSIS — M05.741 RHEUMATOID ARTHRITIS INVOLVING BOTH HANDS WITH POSITIVE RHEUMATOID FACTOR (H): ICD-10-CM

## 2024-09-30 DIAGNOSIS — N52.9 ERECTILE DYSFUNCTION, UNSPECIFIED ERECTILE DYSFUNCTION TYPE: ICD-10-CM

## 2024-09-30 DIAGNOSIS — Z78.9 TAKES DIETARY SUPPLEMENTS: ICD-10-CM

## 2024-09-30 DIAGNOSIS — K21.9 GASTROESOPHAGEAL REFLUX DISEASE, UNSPECIFIED WHETHER ESOPHAGITIS PRESENT: ICD-10-CM

## 2024-09-30 RX ORDER — HYDROXYCHLOROQUINE SULFATE 200 MG/1
200 TABLET, FILM COATED ORAL 2 TIMES DAILY
COMMUNITY
Start: 2024-08-26

## 2024-09-30 ASSESSMENT — PAIN SCALES - GENERAL: PAINLEVEL: MILD PAIN (3)

## 2024-09-30 NOTE — LETTER
9/30/2024      RE: Yifan Quesada  [address] [facility] [address]       Dear Colleague,    Thank you for the opportunity to participate in the care of your patient, Yifan Quesada, at the [facility]. Please see a copy of my visit note below.    Medication Therapy Management (MTM) Encounter    ASSESSMENT:                            Medication Adherence/Access: No issues identified    Weight Management /NAFLD:  Interested in starting zepbound to help with weight loss to improve fatty liver and heart health. For patients that are under [employer] insurance coverage, it is mandated by insurance that each qualifying patient meet with hospital based Weight Management Medication Therapy Management pharmacist to continue therapy coverage. The following patient meets the below coverage criteria and can therefore continue GLP-1/GIP agonist therapy for Weight Management:    Adult  BMI >40 with or without comorbidities   OR   BMI >30 + NAFLD*   at time of initiating GLP-1/GIP agonist therapy Approved for 6 months Met Updated Initial Criteria   At least 5% weight loss of baseline body weight  Approved for 12 months        Hyperlipidemia   stable    Hypertension   Clinic blood pressure above goal <140/90. Blood pressure lower when he checks at work. Target weight loss to help lower blood pressure <130/80 if able. Will ask for a specific home reading to update his chart.     GERD    stable    Erectile dysfunction:   stable    Rheumatoid arthritis:   Too soon to evaluate full efficacy but has noted some symptoms improvement. Follow rheumatology plan of care.     Supplements:   stable    PLAN:                            Start zepbound 2.5 mg once weekly for 4 weeks, then increase to 5 mg once weekly   Please send me an updated blood pressure reading from work to update your chart   Schedule with Dr. Sepulveda in 4-5 months  "    Follow-up: 12/6/24    SUBJECTIVE/OBJECTIVE:                          Yifan Quesada is a 48 year old male seen for an initial visit. He was referred to me from Dr. Sepulveda.      Reason for visit: start Zepbound.    Allergies/ADRs: Reviewed in chart  Past Medical History: Reviewed in chart  Tobacco: He reports that he quit smoking about 29 years ago. His smoking use included cigarettes and clove cigarettes or kreteks. He started smoking about 30 years ago. He has never used smokeless tobacco.  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 Eating twice per day. Drinking a lot of water, more than 2L per day. Switched from soda to sparkling water.   Exercise/Activity: limited due to rheumatoid arthritis symptoms  Medications Tried/Failed:  Wegovy - horrible diarrhea      Current weight today: 280 lbs 0 oz  Goal: decrease weight to ~235 lbs. Has a lot of muscle.   Wt Readings from Last 4 Encounters:   09/30/24 127 kg (280 lb)   09/24/24 129.3 kg (285 lb)   07/08/24 129.4 kg (285 lb 3.2 oz)   08/17/23 126.1 kg (278 lb 1.6 oz)     Estimated body mass index is 41.35 kg/m  as calculated from the following:    Height as of this encounter: 1.753 m (5' 9\").    Weight as of this encounter: 127 kg (280 lb).    Hemoglobin A1C   Date Value Ref Range Status   04/29/2022 5.5 <=5.6 % Final     Comment:       Prediabetes: 5.7 to 6.4%        Diabetes:  >=6.5%     Patients with Hgb F >5%, total bilirubin >10.0 mg/dL, abnormal red cell turnover, severe renal or hepatic disease or malignancy should not have this A1C method used to diagnose or monitor diabetes.        Hyperlipidemia/CAD:  Ezetimibe (Zetia) 10 mg once daily   Evolocumab (Repatha) 140 mg every 14 weeks  Niacin 500 mg once daily " "  Patient reports no significant myalgias or other side effects. He recently re-started zetia after LDL increased.  CT coronary calcium score 68. Following with cardiology.      Recent Labs   Lab Test 07/08/24  1154 12/11/23  1315   CHOL 162 96   HDL 37* 33*   LDL 89 38   TRIG 179* 127       Hypertension  Olmesartan-hydrochlorothiazide 40-25 mg once daily   Patient reports no current medication side effects  Patient self monitors blood pressure.  Home BP monitoring 130/80s .  Thinks he has white-coat hypertension.     BP Readings from Last 3 Encounters:   09/24/24 (!) 140/66   07/08/24 130/79   08/17/23 125/80       GERD   Omeprazole 20 mg once daily   Patient feels that current regimen is effective.       Erectile dysfunction:   Tadalafil 5 mg once daily   This is effective.   No side effects.     Rheumatoid arthritis:   Hydroxychloroquine 200 mg 2 times daily  Ibuprofen as needed- less since starting hydroxychloroquine   Started recently for \"pre-RA\". Pain is improving a bit. No side effects.   Following with rheumatology.     Supplements:   Metamucil daily  No reported issues at this time.     Today's Vitals: Ht 1.753 m (5' 9\")   Wt 127 kg (280 lb)   BMI 41.35 kg/m    ----------------    I spent 25 minutes with this patient today. All changes were made via collaborative practice agreement with Helena Ramirez CNP and Dr. Sepulveda A copy of the visit note was provided to the patient's provider(s).    A summary of these recommendations was sent via [employer].    Telemedicine Visit Details  The patient's medications can be safely assessed via a telemedicine encounter.  Type of service:  Telephone visit  Originating Location (pt. Location): Home    Distant Location (provider location):  Off-site  Start Time: 10:31 AM  End Time: 10:56 AM     Medication Therapy Recommendations  Class 3 severe obesity due to excess calories with serious comorbidity and body mass index (BMI) of 40.0 to 44.9 in adult (H)    Rationale: " Untreated condition - Needs additional medication therapy - Indication   Recommendation: Start Medication - Zepbound 2.5 MG/0.5ML Soaj   Status: Accepted per CPA                Please do not hesitate to contact me if you have any questions/concerns.     Sincerely,     CONSUELO BARRIOS RPH

## 2024-09-30 NOTE — NURSING NOTE
"Current patient location: [address] [place] [id number]    Is the patient currently in the state of [place]? YES    Visit mode:VIDEO    If the visit is dropped, the patient can be reconnected by: VIDEO VISIT: Text to cell phone:   Telephone Information:   Mobile [phone]       Will anyone else be joining the visit? NO  (If patient encounters technical issues they should call [phone]    How would you like to obtain your AVS? MyChart    Are changes needed to the allergy or medication list? No    Are refills needed on medications prescribed by this physician? NO    Rooming Documentation:  Questionnaire(s) not pre-assigned    Reason for visit: Medication Therapy Management    Patient states 3/10 joint pain today \"pre-RA\" or palindrome arthritis.    Paul Castro VVF      "

## 2024-09-30 NOTE — PATIENT INSTRUCTIONS
"Recommendations from today's [facility] visit:                                                      Start zepbound 2.5 mg once weekly for 4 weeks, then increase to 5 mg once weekly   Please send me an updated blood pressure reading from work to update your chart   Schedule with Dr. Sepulveda in 4-5 months     [employer] employees with "[employer] insurance ([facility]) are restricted to using the [facility] for Saxenda, Wegovy, and Zepbound    [facility]  [place], [place] - [facility] [address]  Phone: [phone]    Next steps:  After processing the prescription and saving to your profile, the pharmacy will give you an automated call to inform you that they have your prescription on file. This typically occurs within 1-2 days after the prescription is sent but may take 3-5 business days during high volume times.  You will need to call the pharmacy back to set up the first delivery of every new prescription or new medication dose.   Once you are on a stable dose, you can inquire with the pharmacy about signing up for \"Text to Order through [employer]\", \"Auto Fill\", and/or using the \"My "[employer] Rx\" tod.        Zepbound Dosing:   Start Zepbound: It is a subcutaneous injection that you inject once weekly and titrate the dose slowly over time. Make sure inject the same time each day of the week, for example Monday evenings.  Each pen is single use.  In each prescription, you will get 4 pens in each box.  You will need a new prescription for each strength of the medication.  Week 1-4: Inject 2.5 mg once weekly  Week 5-8: If tolerating, can increase to 5 mg once weekly if necessary  Week 9-12: If tolerating, can increase to 7.5 mg once weekly if necessary  Week 13-16: If tolerating, can increase to 10 mg once weekly if necessary  Week 17-20: If tolerating, can increase to 12.5 mg once weekly if necessary  Week 21 and on: if tolerating, can increase to 15 mg once weekly " "if necessary    The goal is to get to a dose that is well tolerated and effective for you. You do not have to go up on the dose each month or get to maximum dose if getting an effective response with minimal side effects.     *If you are having some nausea or other side effects to where you are hesitant to move up to the next dose, stay at the same dose you are on for an additional week to see if side effect(s) improves/resolves. Make sure to take this time to hydrate and ensure you are drinking at least 64 oz water per day. If you are wanting to stay at the same dose and do not have additional refills on that prescription please reach out to the clinic.    Zepbound Administration Video: Video that was created by the .  https://www.[employer]/how-to-use    Zepbound Copay Card:  https://www.[employer]/coverage-savings    Zepbound Storage and Stability:   Make sure that when you get the prescription that you store the prescription in the refrigerator until it is time to use the Zepbound pen.  Once it is time to use the Zepbound pen, you can keep the pen at room temperature and it is good for up to 21 days at room temperature.     Zepbound Common Side Effects:   Nausea, diarrhea, constipation, headache, tiredness (fatigue), dizziness, stomach upset/pain. Less commonly, Zepbound can cause low blood sugar (symptoms: shaky, dizzy, sweaty, agitation). Please reach out to the care team should you feel like this is occurring. It is important to ensure that you are eating consistent meals and not skipping meals. Ensure you are getting at least 64 oz water daily.       Follow-up: 2 months     It was great speaking with you today.  I value your experience and would be very thankful for your time in providing feedback in our clinic survey. In the next few days, you may receive an email or text message from [employer] with a link to a survey related to your  clinical pharmacist.\"     To schedule " another MTM appointment, please call the clinic directly or you may call the MTM scheduling line at [phone].    My Clinical Pharmacist's contact information:                                                      Please feel free to contact me with any questions or concerns you have.      Mirta Mederos, PharmD, [facility]  Medication Therapy Management Pharmacist

## 2024-09-30 NOTE — TELEPHONE ENCOUNTER
"Prior Authorization Retail Medication Request    Medication/Dose: Zepbound 2.5 mg onceweekly  Diagnosis and ICD code (if different than what is on RX):    New/renewal/insurance change PA/secondary ins. PA:  Previously Tried and Failed:  diet and exercise for at least 3 months without clinically effective sustainable weight loss and Wegovy: diarrhea  Rationale: Yifan Quesada is a 48 year old male with a diagnosis of Class III Obesity (BMI at least 40 kg/m2).     Estimated body mass index is 41.35 kg/m  as calculated from the following:    Height as of an earlier encounter on 9/30/24: 1.753 m (5' 9\").    Weight as of an earlier encounter on 9/30/24: 127 kg (280 lb).     Insurance   Primary: [facility]  Insurance ID:  [id number]     "

## 2024-09-30 NOTE — PROGRESS NOTES
Medication Therapy Management (MTM) Encounter    ASSESSMENT:                            Medication Adherence/Access: No issues identified    Weight Management /NAFLD:  Interested in starting zepbound to help with weight loss to improve fatty liver and heart health. For patients that are under [employer] insurance coverage, it is mandated by insurance that each qualifying patient meet with hospital based Weight Management Medication Therapy Management pharmacist to continue therapy coverage. The following patient meets the below coverage criteria and can therefore continue GLP-1/GIP agonist therapy for Weight Management:    Adult  BMI >40 with or without comorbidities   OR   BMI >30 + NAFLD*   at time of initiating GLP-1/GIP agonist therapy Approved for 6 months Met Updated Initial Criteria   At least 5% weight loss of baseline body weight  Approved for 12 months        Hyperlipidemia   stable    Hypertension   Clinic blood pressure above goal <140/90. Blood pressure lower when he checks at work. Target weight loss to help lower blood pressure <130/80 if able. Will ask for a specific home reading to update his chart.     GERD    stable    Erectile dysfunction:   stable    Rheumatoid arthritis:   Too soon to evaluate full efficacy but has noted some symptoms improvement. Follow rheumatology plan of care.     Supplements:   stable    PLAN:                            Start zepbound 2.5 mg once weekly for 4 weeks, then increase to 5 mg once weekly   Please send me an updated blood pressure reading from work to update your chart   Schedule with Dr. Sepulveda in 4-5 months     Follow-up: 12/6/24    SUBJECTIVE/OBJECTIVE:                          Yifan Quesada is a 48 year old male seen for an initial visit. He was referred to me from Dr. Sepulveda.      Reason for visit: start Zepbound.    Allergies/ADRs: Reviewed in chart  Past Medical History: Reviewed in chart  Tobacco: He reports that he quit smoking about  "29 years ago. His smoking use included cigarettes and clove cigarettes or kreteks. He started smoking about 30 years ago. He has never used smokeless tobacco.  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 Eating twice per day. Drinking a lot of water, more than 2L per day. Switched from soda to sparkling water.   Exercise/Activity: limited due to rheumatoid arthritis symptoms  Medications Tried/Failed:  Wegovy - horrible diarrhea      Current weight today: 280 lbs 0 oz  Goal: decrease weight to ~235 lbs. Has a lot of muscle.   Wt Readings from Last 4 Encounters:   09/30/24 127 kg (280 lb)   09/24/24 129.3 kg (285 lb)   07/08/24 129.4 kg (285 lb 3.2 oz)   08/17/23 126.1 kg (278 lb 1.6 oz)     Estimated body mass index is 41.35 kg/m  as calculated from the following:    Height as of this encounter: 1.753 m (5' 9\").    Weight as of this encounter: 127 kg (280 lb).    Hemoglobin A1C   Date Value Ref Range Status   04/29/2022 5.5 <=5.6 % Final     Comment:       Prediabetes: 5.7 to 6.4%        Diabetes:  >=6.5%     Patients with Hgb F >5%, total bilirubin >10.0 mg/dL, abnormal red cell turnover, severe renal or hepatic disease or malignancy should not have this A1C method used to diagnose or monitor diabetes.        Hyperlipidemia /CAD:  Ezetimibe (Zetia) 10 mg once daily   Evolocumab (Repatha) 140 mg every 14 weeks  Niacin 500 mg once daily   Patient reports no significant myalgias or other side effects. He recently re-started zetia after LDL increased.  CT coronary calcium score 68. Following with cardiology.      Recent Labs   Lab Test 07/08/24  1154 12/11/23  1315   CHOL 162 96   HDL 37* 33*   LDL 89 38   TRIG 179* 127       Hypertension   Olmesartan-hydrochlorothiazide 40-25 " "mg once daily   Patient reports no current medication side effects  Patient self monitors blood pressure.  Home BP monitoring 130/80s .  Thinks he has white-coat hypertension.     BP Readings from Last 3 Encounters:   09/24/24 (!) 140/66   07/08/24 130/79   08/17/23 125/80       GERD    Omeprazole 20 mg once daily   Patient feels that current regimen is effective.       Erectile dysfunction:   Tadalafil 5 mg once daily   This is effective.   No side effects.     Rheumatoid arthritis:   Hydroxychloroquine 200 mg 2 times daily  Ibuprofen as needed- less since starting hydroxychloroquine   Started recently for \"pre-RA\". Pain is improving a bit. No side effects.   Following with rheumatology.     Supplements:   Metamucil daily  No reported issues at this time.     Today's Vitals: Ht 1.753 m (5' 9\")   Wt 127 kg (280 lb)   BMI 41.35 kg/m    ----------------    I spent 25 minutes with this patient today. All changes were made via collaborative practice agreement with Helena Ramirez CNP and Dr. Sepulveda A copy of the visit note was provided to the patient's provider(s).    A summary of these recommendations was sent via [employer].    Telemedicine Visit Details  The patient's medications can be safely assessed via a telemedicine encounter.  Type of service:  Telephone visit  Originating Location (pt. Location): Home    Distant Location (provider location):  Off-site  Start Time: 10:31 AM  End Time: 10:56 AM     Medication Therapy Recommendations  Class 3 severe obesity due to excess calories with serious comorbidity and body mass index (BMI) of 40.0 to 44.9 in adult (H)    Rationale: Untreated condition - Needs additional medication therapy - Indication   Recommendation: Start Medication - Zepbound 2.5 MG/0.5ML Soaj   Status: Accepted per CPA              "

## 2024-10-01 NOTE — TELEPHONE ENCOUNTER
PA Initiation    Medication: ZEPBOUND 2.5 MG/0.5ML SC SOAJ  Insurance Company: [employer] - Phone [phone] Fax [phone]  Pharmacy Filling the Rx: [facility] - [place], [place] - [address]  Filling Pharmacy Phone:    Filling Pharmacy Fax:    Start Date: 10/1/2024       [id number]

## 2024-10-02 NOTE — TELEPHONE ENCOUNTER
Prior Authorization Approval    Medication: ZEPBOUND 2.5 MG/0.5ML SC SOAJ  Authorization Effective Date: 10/1/2024  Authorization Expiration Date: 4/1/2025  Approved Dose/Quantity:    Reference #: [id number]   Insurance Company: "[employer] - Phone [phone] Fax [phone]  Expected CoPay: $    CoPay Card Available: No    Financial Assistance Needed:    Which Pharmacy is filling the prescription: [facility] - [place], [place] - [address]  Pharmacy Notified: Y  Patient Notified: Y

## 2024-10-03 ENCOUNTER — ALLIED HEALTH/NURSE VISIT (OUTPATIENT)
Dept: FAMILY MEDICINE | Facility: CLINIC | Age: 49
End: 2024-10-03
Payer: COMMERCIAL

## 2024-10-03 DIAGNOSIS — Z23 NEED FOR VACCINATION: Primary | ICD-10-CM

## 2024-10-03 PROCEDURE — 90471 IMMUNIZATION ADMIN: CPT

## 2024-10-03 PROCEDURE — 99207 PR NO CHARGE NURSE ONLY: CPT

## 2024-10-03 PROCEDURE — 90750 HZV VACC RECOMBINANT IM: CPT

## 2024-10-03 NOTE — PROGRESS NOTES
Prior to immunization administration, verified patients identity using patient s name and date of birth. Please see Immunization Activity for additional information.     Screening Questionnaire for Adult Immunization    Are you sick today?   No   Do you have allergies to medications, food, a vaccine component or latex?   No   Have you ever had a serious reaction after receiving a vaccination?   No   Do you have a long-term health problem with heart, lung, kidney, or metabolic disease (e.g., diabetes), asthma, a blood disorder, no spleen, complement component deficiency, a cochlear implant, or a spinal fluid leak?  Are you on long-term aspirin therapy?   No   Do you have cancer, leukemia, HIV/AIDS, or any other immune system problem?   No   Do you have a parent, brother, or sister with an immune system problem?   No   In the past 3 months, have you taken medications that affect  your immune system, such as prednisone, other steroids, or anticancer drugs; drugs for the treatment of rheumatoid arthritis, Crohn s disease, or psoriasis; or have you had radiation treatments?   No   Have you had a seizure, or a brain or other nervous system problem?   No   During the past year, have you received a transfusion of blood or blood    products, or been given immune (gamma) globulin or antiviral drug?   No   For women: Are you pregnant or is there a chance you could become       pregnant during the next month?   No   Have you received any vaccinations in the past 4 weeks?   No     Immunization questionnaire answers were all negative.    I have reviewed the following standing orders:   This patient is due and qualifies for the Zoster vaccine.      I have reviewed the vaccines inclusion and exclusion criteria; No concerns regarding eligibility.     Patient instructed to remain in clinic for 15 minutes afterwards, and to report any adverse reactions.     Screening performed by Janae Velasquez MA on 10/3/2024 at 9:22 AM.

## 2024-10-08 DIAGNOSIS — E88.810 METABOLIC SYNDROME X: ICD-10-CM

## 2024-10-08 DIAGNOSIS — Z78.9 STATIN INTOLERANCE: ICD-10-CM

## 2024-10-08 RX ORDER — EVOLOCUMAB 140 MG/ML
INJECTION, SOLUTION SUBCUTANEOUS
Qty: 6 ML | Refills: 2 | Status: SHIPPED | OUTPATIENT
Start: 2024-10-08

## 2024-10-28 ENCOUNTER — TRANSFERRED RECORDS (OUTPATIENT)
Dept: HEALTH INFORMATION MANAGEMENT | Facility: CLINIC | Age: 49
End: 2024-10-28
Payer: COMMERCIAL

## 2024-10-28 ENCOUNTER — MEDICAL CORRESPONDENCE (OUTPATIENT)
Dept: HEALTH INFORMATION MANAGEMENT | Facility: CLINIC | Age: 49
End: 2024-10-28
Payer: COMMERCIAL

## 2024-10-29 ENCOUNTER — TRANSCRIBE ORDERS (OUTPATIENT)
Dept: CALL CENTER | Age: 49
End: 2024-10-29
Payer: COMMERCIAL

## 2024-10-29 DIAGNOSIS — K76.0 FATTY LIVER: Primary | ICD-10-CM

## 2024-11-04 ENCOUNTER — HOSPITAL ENCOUNTER (EMERGENCY)
Facility: CLINIC | Age: 49
Discharge: HOME | End: 2024-11-04
Payer: COMMERCIAL

## 2024-11-04 ENCOUNTER — TRANSFERRED RECORDS (OUTPATIENT)
Dept: HEALTH INFORMATION MANAGEMENT | Facility: CLINIC | Age: 49
End: 2024-11-04
Payer: COMMERCIAL

## 2024-11-04 VITALS
SYSTOLIC BLOOD PRESSURE: 123 MMHG | DIASTOLIC BLOOD PRESSURE: 74 MMHG | RESPIRATION RATE: 17 BRPM | TEMPERATURE: 99 F | BODY MASS INDEX: 40.32 KG/M2 | HEART RATE: 89 BPM | WEIGHT: 273 LBS | OXYGEN SATURATION: 93 %

## 2024-11-04 DIAGNOSIS — L03.113 CELLULITIS OF RIGHT HAND: ICD-10-CM

## 2024-11-04 PROCEDURE — 250N000013 HC RX MED GY IP 250 OP 250 PS 637

## 2024-11-04 PROCEDURE — 99284 EMERGENCY DEPT VISIT MOD MDM: CPT

## 2024-11-04 RX ORDER — DOXYCYCLINE 100 MG/1
100 CAPSULE ORAL ONCE
Status: COMPLETED | OUTPATIENT
Start: 2024-11-04 | End: 2024-11-04

## 2024-11-04 RX ORDER — CEPHALEXIN 500 MG/1
500 CAPSULE ORAL 4 TIMES DAILY
Qty: 40 CAPSULE | Refills: 0 | Status: SHIPPED | OUTPATIENT
Start: 2024-11-04 | End: 2024-11-14

## 2024-11-04 RX ORDER — DOXYCYCLINE 100 MG/1
100 CAPSULE ORAL 2 TIMES DAILY
Qty: 20 CAPSULE | Refills: 0 | Status: SHIPPED | OUTPATIENT
Start: 2024-11-04

## 2024-11-04 RX ORDER — CEPHALEXIN 500 MG/1
500 CAPSULE ORAL ONCE
Status: COMPLETED | OUTPATIENT
Start: 2024-11-04 | End: 2024-11-04

## 2024-11-04 RX ADMIN — CEPHALEXIN 500 MG: 500 CAPSULE ORAL at 10:56

## 2024-11-04 RX ADMIN — DOXYCYCLINE HYCLATE 100 MG: 100 CAPSULE ORAL at 10:47

## 2024-11-04 ASSESSMENT — COLUMBIA-SUICIDE SEVERITY RATING SCALE - C-SSRS
1. IN THE PAST MONTH, HAVE YOU WISHED YOU WERE DEAD OR WISHED YOU COULD GO TO SLEEP AND NOT WAKE UP?: NO
6. HAVE YOU EVER DONE ANYTHING, STARTED TO DO ANYTHING, OR PREPARED TO DO ANYTHING TO END YOUR LIFE?: NO
2. HAVE YOU ACTUALLY HAD ANY THOUGHTS OF KILLING YOURSELF IN THE PAST MONTH?: NO

## 2024-11-04 ASSESSMENT — ACTIVITIES OF DAILY LIVING (ADL)
ADLS_ACUITY_SCORE: 0
ADLS_ACUITY_SCORE: 0

## 2024-11-04 NOTE — ED PROVIDER NOTES
Emergency Department Midlevel Supervisory Note     I personally saw the patient and performed a substantive portion of the visit including all aspects of the medical decision making.    ED Course:  9:53 AM Farrah Sandra PA-C staffed patient with me. I agree with their assessment and plan of management, and I will see the patient.  ***I met with the patient to introduce myself, gather additional history, perform my initial exam, and discuss the plan.     Brief HPI:     Yifan Quesada is a 48 year old male who presents for evaluation of hand pain. Patient cut the 4th finger of his left hand 2-3 days ago, which scabbed over. Since then, he has developed increased swelling and redness throughout is entire left hand.    I, Addie Castellon, am serving as a scribe to document services personally performed by Toby Mayo MD, based on my observations and the provider's statements to me.   I, Toby Mayo MD, attest that Addie Castellon was acting in a scribe capacity, has observed my performance of the services and has documented them in accordance with my direction.    Brief Physical Exam: /76   Pulse 101   Temp 99  F (37.2  C) (Oral)   Resp 16   Wt 123.8 kg (273 lb)   SpO2 95%   BMI 40.32 kg/m    Constitutional:  Alert, in no acute distress  EYES: Conjunctivae clear  HENT:  Atraumatic  Respiratory:  Respirations even, unlabored, in no acute respiratory distress  Cardiovascular:  Regular rate and rhythm, good peripheral perfusion  GI: Soft, non-distended, non-tender  Musculoskeletal:  Moves all 4 extremities equally, grossly symmetrical strength  Integument: Warm & dry. No appreciable rash, erythema.  Neurologic:  Alert & oriented, speech clear and fluent, no focal deficits noted  Psych: Normal mood and affect  ***     MDM:  ***       No diagnosis found.    Consults:  I discussed the patient with *** who recommends ***    Labs and Imaging:       I have reviewed the relevant laboratory studies above.    I  independently interpreted the following imaging study(s):   ***    EKG: I reviewed and independently interpreted the patient's EKG, with comments made as listed below. Please see scanned EKG for full report.   ***    Procedures:  I was present for the key portions of procedures documented in ANGEL/midlevel note, see midlevel note for further details.    Toby Mayo MD  [facility] EMERGENCY ROOM  [address] [place] [id number] [phone]

## 2024-11-04 NOTE — ED TRIAGE NOTES
Pt has RA.    Friday he began to have B hand swelling.  On Saturday his left hand improved but his right hand is swollen, red and he has been having fevers.     Triage Assessment (Adult)       Row Name 11/04/24 0919          Triage Assessment    Airway WDL WDL        Respiratory WDL    Respiratory WDL WDL        Skin Circulation/Temperature WDL    Skin Circulation/Temperature WDL WDL        Cardiac WDL    Cardiac WDL WDL        Peripheral/Neurovascular WDL    Peripheral Neurovascular WDL WDL        Cognitive/Neuro/Behavioral WDL    Cognitive/Neuro/Behavioral WDL WDL

## 2024-11-04 NOTE — ED PROVIDER NOTES
Emergency Department Encounter   NAME: Yifan Quesada ; AGE: 48 year old male ; YOB: 1975 ; MRN: [id number] ; PCP: Devang Sepulveda   ED PROVIDER: Farrah Sandra PA-C    Evaluation Date & Time:   11/4/2024  9:21 AM    CHIEF COMPLAINT:  Hand Pain      FINAL IMPRESSION:    ICD-10-CM    1. Cellulitis of right hand  L03.113             IMPRESSION AND PLAN   MDM: Yifan Quesada is a 48 year old male with a pertinent history of GERD, obesity, HTN, prediabetes, HLD, metabolic syndrome, CKD who presents to the ED by walk-in for evaluation of edema, erythema, warmth to the dorsal aspect of the right hand that first began about 2 days ago after patient was working on his cabin over the weekend.  Patient reports that while he was working on the cabin, piece of wood cut into his right little finger which he thinks precipitated the onset of his symptoms.  Patient was recently diagnosed with RA and is currently taking Plaquenil twice daily with good control of his RA.    Vitals - slightly tachycardic at 101 otherwise vitally stable. On exam patient is well-appearing in no acute distress.  R hand reveals notable edema, erythema, warmth and diffuse tenderness throughout the dorsal aspect.  L hand appears completely unremarkable.  Active ROM of all digits and R wrist intact, less concerning for septic arthritis.  Additionally, there is no concern for sausage fingers, less concerning for flexor tenosynovitis.  There is no obvious fluctuance or masses to suggest abscess.  Given that patient was cut with a piece of wood, will move forward with POC US to rule out retained foreign body.     POC US performed at bedside by Dr. Mayo.  Did reveal cobblestoning diffusely throughout the dorsal aspect of the R hand to suggest cellulitis.  No obvious retained foreign bodies directly underneath the scab.  Patient informed of concerns regarding cellulitis and he expresses understanding.  Per pharmacy recommendations, will cover  with doxycycline and Keflex for broader coverage.  Patient is amenable to this plan. Given long list of antibiotic related allergies resulting in rash, will monitor patient for 10 minutes following antibiotic administration. Patient reports rash with many antibiotics but has never experience anaphylactic type reaction. He is agreeable to Doxycycline and Keflex even though there is a warning from his allergy. I encouraged him to use Tylenol as needed for pain and swelling.  Patient is agreeable and feels comfortable discharge at this time.    Patient seen in conjunction with Dr. Mayo.    History:  Supplemental history from: N/A  External Record(s) reviewed: Documented in chart    Work Up:  Chart documentation includes differential considered and any EKGs or imaging independently interpreted by provider, where specified.  In additional to work up documented, I considered the following work up: Documented in chart, if applicable.    External consultation:  Discussion of management with another provider: Documented in chart, if applicable    Complicating factors:  Care impacted by chronic illness: See HPI.  Care affected by social determinants of health: N/A    Disposition considerations: Discharge. I prescribed additional prescription strength medication(s) as charted. See documentation for any additional details.  Not Applicable      Chart Review: N/A    ED COURSE:  9:42 AM I met and introduced myself to the patient. I gathered initial history and performed my physical exam. We discussed plan for initial workup.   9:53 AM I have staffed the patient with Dr. Mayo, ED MD, who has evaluated the patient and agrees with all aspects of today's care.   10:50 AM I rechecked the patient and discussed results, discharge, follow up, and reasons to return to the ED.       MEDICATIONS GIVEN IN THE EMERGENCY DEPARTMENT:  Medications   doxycycline hyclate (VIBRAMYCIN) capsule 100 mg (100 mg Oral $Given 11/4/24 8777)   cephALEXin  (KEFLEX) capsule 500 mg (500 mg Oral $Given 11/4/24 0832)         NEW PRESCRIPTIONS STARTED AT TODAY'S ED VISIT:  New Prescriptions    CEPHALEXIN (KEFLEX) 500 MG CAPSULE    Take 1 capsule (500 mg) by mouth 4 times daily for 10 days.    DOXYCYCLINE HYCLATE (VIBRAMYCIN) 100 MG CAPSULE    Take 1 capsule (100 mg) by mouth 2 times daily.         BRIEF HPI   Patient information was obtained from: Patient   Use of Intrepreter: N/A     Yifan Quesada is a 48 year old male who presents emergency department for swelling and pain of his right hand.  Patient reports that over the weekend, he was working on his cabin when a piece of wood cut into his right little finger.  Since then, the wound has scabbed over however patient reports increasing swelling, pain, erythema and warmth over the dorsal aspect of the right hand.  Patient initially thought he was experiencing a rheumatoid arthritis flare as the left hand also became acutely swollen and painful however the symptoms resolved on their own.  Patient does take Plaquenil for rheumatoid arthritis twice daily and has been able to manage his RA with this medication.  He did see his rheumatologist regarding his concerns and was encouraged to seek further evaluation at the emergency department due to concerns for cellulitis.  Patient otherwise denies fever, chills, nausea, vomiting, decreased ROM, numbness, tingling.      REVIEW OF SYSTEMS:  Pertinent positive and negative symptoms per HPI.       MEDICAL HISTORY     Past Medical History:   Diagnosis Date    ED (erectile dysfunction)     GERD (gastroesophageal reflux disease)     Hypertension     Metabolic syndrome     Tendonitis, bicipital     White coat hypertension        Past Surgical History:   Procedure Laterality Date    .nasal fracture repair      NASAL FRACTURE SURGERY         Family History   Problem Relation Age of Onset    Other Cancer Mother         Lung cancer    Cancer Mother         lung     Depression Mother      Schizophrenia Mother     Other - See Comments Mother         low blood sugars     Snoring Mother     Polycythemia Mother     Mental Illness Mother     Hypertension Mother     Coronary Artery Disease Father 48    Snoring Father     Hypertension Father     Obesity Father     Hypertension Sister     Depression Sister     Obesity Sister     Heart Failure Maternal Grandmother     Hypertension Maternal Grandfather     Hypertension Paternal Grandmother     Heart Failure Paternal Grandmother     Breast Cancer Paternal Grandmother     Diabetes Paternal Grandmother     Obesity Paternal Grandmother     Prostate Cancer Paternal Grandfather     Hypertension Paternal Grandfather     Obesity Paternal Grandfather     Colon Cancer No family hx of     Thyroid Cancer No family hx of        Social History     Tobacco Use    Smoking status: Former     Current packs/day: 0.00     Types: Cigarettes, Clove cigarettes or kreteks     Start date: 1994     Quit date: 1995     Years since quittin.6    Smokeless tobacco: Never    Tobacco comments:     in college   Vaping Use    Vaping status: Never Used   Substance Use Topics    Alcohol use: Yes     Comment: Alcoholic Drinks/day: 2 drinks per month     Drug use: Never       cephALEXin (KEFLEX) 500 MG capsule  doxycycline hyclate (VIBRAMYCIN) 100 MG capsule  evolocumab (REPATHA SURECLICK) 140 MG/ML prefilled autoinjector  ezetimibe (ZETIA) 10 MG tablet  hydroxychloroquine (PLAQUENIL) 200 MG tablet  ibuprofen (ADVIL/MOTRIN) 200 MG capsule  MULTIPLE VITAMIN PO  niacin 500 MG tablet  olmesartan-hydrochlorothiazide (BENICAR HCT) 40-25 MG tablet  omeprazole (PRILOSEC) 20 MG DR capsule  Psyllium (METAMUCIL PO)  tadalafil (CIALIS) 5 MG tablet  tirzepatide-Weight Management (ZEPBOUND) 2.5 MG/0.5ML prefilled pen  tirzepatide-Weight Management (ZEPBOUND) 5 MG/0.5ML prefilled pen        PHYSICAL EXAM     First Vitals:  Patient Vitals for the past 24 hrs:   BP Temp Temp src Pulse Resp SpO2  Weight   11/04/24 1049 123/74 -- -- 89 17 93 % --   11/04/24 0921 135/76 99  F (37.2  C) Oral 101 16 95 % 123.8 kg (273 lb)       PHYSICAL EXAM:  Physical Exam  Vitals and nursing note reviewed.   Constitutional:       General: He is not in acute distress.     Appearance: Normal appearance. He is obese. He is not ill-appearing or toxic-appearing.   Cardiovascular:      Rate and Rhythm: Normal rate.   Pulmonary:      Effort: Pulmonary effort is normal.   Musculoskeletal:      Right hand: Swelling and tenderness present. No deformity or bony tenderness. Normal range of motion. Normal strength. Normal sensation. Normal capillary refill. Normal pulse.      Left hand: Normal.      Comments: Small scab over the dorsal surface of the R little finger. No active drainage from the wound.    Skin:     General: Skin is warm and dry.      Comments: Notable swelling, erythema and warmth along the dorsal aspect of the R hand. No obvious fluctuance or masses appreciated.    Neurological:      General: No focal deficit present.      Mental Status: He is alert and oriented to person, place, and time.   Psychiatric:         Mood and Affect: Mood normal.          RESULTS     LAB:  All pertinent labs reviewed and interpreted  Labs Ordered and Resulted from Time of ED Arrival to Time of ED Departure - No data to display      RADIOLOGY:  No orders to display       PROCEDURES:  Please see Dr. Mayo's note for POC US results.         Farrah Sandra PA-C  Emergency Medicine   [facility] EMERGENCY ROOM       Farrah Sandra PA-C  11/04/24 6117

## 2024-11-04 NOTE — DISCHARGE INSTRUCTIONS
Thankfully, the US was negative for any retained foreign body. However, based on your symptoms we will treat for cellulitis with antibiotics. You can continue to use tylenol/ibuprofen as needed for pain and swelling. Please take the antibiotic in its entirety to ensure the bacteria has been treated appropriately. Please return to the ED for any new or worsening symptoms.

## 2024-12-01 ENCOUNTER — MYC REFILL (OUTPATIENT)
Dept: FAMILY MEDICINE | Facility: CLINIC | Age: 49
End: 2024-12-01
Payer: COMMERCIAL

## 2024-12-01 DIAGNOSIS — N52.9 ERECTILE DYSFUNCTION, UNSPECIFIED ERECTILE DYSFUNCTION TYPE: ICD-10-CM

## 2024-12-02 RX ORDER — TADALAFIL 5 MG/1
TABLET ORAL
Qty: 90 TABLET | Refills: 3 | Status: SHIPPED | OUTPATIENT
Start: 2024-12-02

## 2024-12-09 ENCOUNTER — E-VISIT (OUTPATIENT)
Dept: FAMILY MEDICINE | Facility: CLINIC | Age: 49
End: 2024-12-09
Payer: COMMERCIAL

## 2024-12-09 DIAGNOSIS — M54.9 CHRONIC BACK PAIN, UNSPECIFIED BACK LOCATION, UNSPECIFIED BACK PAIN LATERALITY: Primary | ICD-10-CM

## 2024-12-09 DIAGNOSIS — G89.29 CHRONIC BACK PAIN, UNSPECIFIED BACK LOCATION, UNSPECIFIED BACK PAIN LATERALITY: Primary | ICD-10-CM

## 2024-12-09 PROCEDURE — 99207 PR NON-BILLABLE SERV PER CHARTING: CPT | Performed by: FAMILY MEDICINE

## 2024-12-09 NOTE — PATIENT INSTRUCTIONS
"Wondering if we can schedule a visit to review your symptoms.  In general, the \"conservative\" treatment for back pain is physical therapy and an anti-inflammatory on a schedule (usually something like naproxen but could be a Medrol Dosepak).  From there your insurance will probably require an x-ray before approval of MRI.    You would mention some change in skin sensation.  Where are your symptoms?    I have asked my clinic to give you a call to try to get you on the schedule in the next week or 2 if that is okay.    Thank you for choosing us for your care. I think an in-clinic or virtual visit would be the best next step based on your symptoms. Please schedule a clinic appointment; you won t be charged for this eVisit.      You can schedule an appointment by clicking here in [facility], or call [phone].  "

## 2024-12-09 NOTE — TELEPHONE ENCOUNTER
Please help patient schedule clinic visit to review back pain symptoms.    Provider E-Visit time total (minutes): 4

## 2024-12-12 ENCOUNTER — TRANSFERRED RECORDS (OUTPATIENT)
Dept: HEALTH INFORMATION MANAGEMENT | Facility: CLINIC | Age: 49
End: 2024-12-12

## 2024-12-12 ENCOUNTER — OFFICE VISIT (OUTPATIENT)
Dept: FAMILY MEDICINE | Facility: CLINIC | Age: 49
End: 2024-12-12
Payer: COMMERCIAL

## 2024-12-12 VITALS
TEMPERATURE: 98.4 F | BODY MASS INDEX: 39.16 KG/M2 | SYSTOLIC BLOOD PRESSURE: 111 MMHG | DIASTOLIC BLOOD PRESSURE: 77 MMHG | WEIGHT: 264.4 LBS | OXYGEN SATURATION: 95 % | RESPIRATION RATE: 14 BRPM | HEART RATE: 85 BPM | HEIGHT: 69 IN

## 2024-12-12 DIAGNOSIS — M25.552 HIP PAIN, LEFT: Primary | ICD-10-CM

## 2024-12-12 NOTE — ASSESSMENT & PLAN NOTE
Patient presents with left hip and thigh pain.  Initially he thought this was a lumbar radiculopathy but as time is progressed it is now localizing to the left hip and anterior thigh.  The pattern is a bit confusing.  We discussed greater trochanteric bursitis.  Referral placed for sports medicine.  Defer imaging to that evaluation.

## 2024-12-12 NOTE — PROGRESS NOTES
"  Assessment & Plan   Problem List Items Addressed This Visit       Hip pain, left - Primary     Patient presents with left hip and thigh pain.  Initially he thought this was a lumbar radiculopathy but as time is progressed it is now localizing to the left hip and anterior thigh.  The pattern is a bit confusing.  We discussed greater trochanteric bursitis.  Referral placed for sports medicine.  Defer imaging to that evaluation.         Relevant Orders    Orthopedic  Referral      Subjective   Yifan is a 48 year old, presenting for the following health issues:  Musculoskeletal Problem (Left kni pain into thigh )        12/12/2024    10:01 AM   Additional Questions   Roomed by ac   Accompanied by self     Ongoing for months   - pain at night   - \"Like IT band and with pins and needles.\"   - no injury.   - no saddle anesthesia, weakness, no urinary incontinence    History of Present Illness       Reason for visit:  Numbness and tingling in my left upper leg.  Symptom onset:  More than a month  Symptom intensity:  Moderate  Symptom progression:  Staying the same  Had these symptoms before:  Yes  Has tried/received treatment for these symptoms:  Yes  Previous treatment was successful:  No  What makes it worse:  Standing and sitting.  Laying on my back flat.  What makes it better:  Moving. A massage gun. Stretching and lifting weights, but only lessens it briefly.   He is taking medications regularly.        Objective    /77 (BP Location: Left arm, Patient Position: Sitting, Cuff Size: Adult Large)   Pulse 85   Temp 98.4  F (36.9  C) (Oral)   Resp 14   Ht 1.753 m (5' 9\")   Wt 119.9 kg (264 lb 6.4 oz)   SpO2 95%   BMI 39.05 kg/m    Body mass index is 39.05 kg/m .  Physical Exam  Nursing note reviewed.   Constitutional:       General: He is not in acute distress.     Appearance: Normal appearance. He is not ill-appearing.   HENT:      Head: Normocephalic and atraumatic.   Eyes:      Extraocular " Movements: Extraocular movements intact.      Conjunctiva/sclera: Conjunctivae normal.   Pulmonary:      Effort: Pulmonary effort is normal.   Musculoskeletal:      Comments: Internal rotation of the hips bilaterally there is decreased.  Pain to palpation over the left greater trochanter.  He also has medial groin pain over the abductor brevis.  Walks with normal gait.  Strength 5/5.  Sensation is equal in the legs in all dermatomal distributions.   Neurological:      Mental Status: He is alert and oriented to person, place, and time.   Psychiatric:         Attention and Perception: Attention normal.         Mood and Affect: Mood normal.         Speech: Speech normal.         Thought Content: Thought content normal.                    Signed Electronically by: Devang Sepulveda MD

## 2024-12-13 ENCOUNTER — TRANSFERRED RECORDS (OUTPATIENT)
Dept: HEALTH INFORMATION MANAGEMENT | Facility: CLINIC | Age: 49
End: 2024-12-13
Payer: COMMERCIAL

## 2024-12-16 ENCOUNTER — PATIENT OUTREACH (OUTPATIENT)
Dept: CARE COORDINATION | Facility: CLINIC | Age: 49
End: 2024-12-16
Payer: COMMERCIAL

## 2024-12-16 DIAGNOSIS — K21.9 GASTROESOPHAGEAL REFLUX DISEASE, UNSPECIFIED WHETHER ESOPHAGITIS PRESENT: ICD-10-CM

## 2025-01-06 ENCOUNTER — TRANSFERRED RECORDS (OUTPATIENT)
Dept: HEALTH INFORMATION MANAGEMENT | Facility: CLINIC | Age: 50
End: 2025-01-06
Payer: COMMERCIAL

## 2025-01-16 DIAGNOSIS — K76.0 NAFLD (NONALCOHOLIC FATTY LIVER DISEASE): Primary | ICD-10-CM

## 2025-01-31 ENCOUNTER — LAB (OUTPATIENT)
Dept: LAB | Facility: CLINIC | Age: 50
End: 2025-01-31
Attending: PHYSICIAN ASSISTANT
Payer: COMMERCIAL

## 2025-01-31 ENCOUNTER — OFFICE VISIT (OUTPATIENT)
Dept: GASTROENTEROLOGY | Facility: CLINIC | Age: 50
End: 2025-01-31
Attending: PHYSICIAN ASSISTANT
Payer: COMMERCIAL

## 2025-01-31 VITALS
WEIGHT: 263 LBS | HEIGHT: 69 IN | BODY MASS INDEX: 38.95 KG/M2 | OXYGEN SATURATION: 97 % | HEART RATE: 81 BPM | DIASTOLIC BLOOD PRESSURE: 77 MMHG | SYSTOLIC BLOOD PRESSURE: 118 MMHG

## 2025-01-31 DIAGNOSIS — K76.0 FATTY LIVER: ICD-10-CM

## 2025-01-31 DIAGNOSIS — R79.89 ELEVATED LFTS: Primary | ICD-10-CM

## 2025-01-31 DIAGNOSIS — K76.0 NAFLD (NONALCOHOLIC FATTY LIVER DISEASE): ICD-10-CM

## 2025-01-31 DIAGNOSIS — R79.89 ELEVATED LFTS: ICD-10-CM

## 2025-01-31 LAB
ALBUMIN SERPL BCG-MCNC: 4.7 G/DL (ref 3.5–5.2)
ALP SERPL-CCNC: 52 U/L (ref 40–150)
ALT SERPL W P-5'-P-CCNC: 43 U/L (ref 0–70)
ANION GAP SERPL CALCULATED.3IONS-SCNC: 10 MMOL/L (ref 7–15)
AST SERPL W P-5'-P-CCNC: 31 U/L (ref 0–45)
BILIRUB DIRECT SERPL-MCNC: 0.24 MG/DL (ref 0–0.3)
BILIRUB SERPL-MCNC: 0.7 MG/DL
BUN SERPL-MCNC: 20.9 MG/DL (ref 6–20)
CALCIUM SERPL-MCNC: 9.5 MG/DL (ref 8.8–10.4)
CHLORIDE SERPL-SCNC: 104 MMOL/L (ref 98–107)
CREAT SERPL-MCNC: 1.31 MG/DL (ref 0.67–1.17)
EGFRCR SERPLBLD CKD-EPI 2021: 66 ML/MIN/1.73M2
ERYTHROCYTE [DISTWIDTH] IN BLOOD BY AUTOMATED COUNT: 12.3 % (ref 10–15)
GLUCOSE SERPL-MCNC: 100 MG/DL (ref 70–99)
HBV CORE AB SERPL QL IA: NONREACTIVE
HBV SURFACE AB SERPL IA-ACNC: 99.7 M[IU]/ML
HBV SURFACE AB SERPL IA-ACNC: REACTIVE M[IU]/ML
HBV SURFACE AG SERPL QL IA: NONREACTIVE
HCO3 SERPL-SCNC: 27 MMOL/L (ref 22–29)
HCT VFR BLD AUTO: 45.2 % (ref 40–53)
HGB BLD-MCNC: 16.2 G/DL (ref 13.3–17.7)
IGA SERPL-MCNC: 144 MG/DL (ref 84–499)
INR PPP: 1.05 (ref 0.85–1.15)
MCH RBC QN AUTO: 30.2 PG (ref 26.5–33)
MCHC RBC AUTO-ENTMCNC: 35.8 G/DL (ref 31.5–36.5)
MCV RBC AUTO: 84 FL (ref 78–100)
PLATELET # BLD AUTO: 230 10E3/UL (ref 150–450)
POTASSIUM SERPL-SCNC: 3.9 MMOL/L (ref 3.4–5.3)
PROT SERPL-MCNC: 7.5 G/DL (ref 6.4–8.3)
RBC # BLD AUTO: 5.37 10E6/UL (ref 4.4–5.9)
SODIUM SERPL-SCNC: 141 MMOL/L (ref 135–145)
WBC # BLD AUTO: 8.5 10E3/UL (ref 4–11)

## 2025-01-31 PROCEDURE — 36415 COLL VENOUS BLD VENIPUNCTURE: CPT | Performed by: PATHOLOGY

## 2025-01-31 PROCEDURE — 85027 COMPLETE CBC AUTOMATED: CPT | Performed by: PATHOLOGY

## 2025-01-31 PROCEDURE — 85610 PROTHROMBIN TIME: CPT | Performed by: PATHOLOGY

## 2025-01-31 PROCEDURE — 80053 COMPREHEN METABOLIC PANEL: CPT | Performed by: PATHOLOGY

## 2025-01-31 PROCEDURE — 86706 HEP B SURFACE ANTIBODY: CPT | Performed by: PHYSICIAN ASSISTANT

## 2025-01-31 PROCEDURE — 82248 BILIRUBIN DIRECT: CPT | Performed by: PATHOLOGY

## 2025-01-31 PROCEDURE — 86704 HEP B CORE ANTIBODY TOTAL: CPT | Performed by: PHYSICIAN ASSISTANT

## 2025-01-31 PROCEDURE — 86364 TISS TRNSGLTMNASE EA IG CLAS: CPT | Performed by: PHYSICIAN ASSISTANT

## 2025-01-31 PROCEDURE — 99000 SPECIMEN HANDLING OFFICE-LAB: CPT | Performed by: PATHOLOGY

## 2025-01-31 PROCEDURE — 99204 OFFICE O/P NEW MOD 45 MIN: CPT | Performed by: PHYSICIAN ASSISTANT

## 2025-01-31 PROCEDURE — 86258 DGP ANTIBODY EACH IG CLASS: CPT | Performed by: PHYSICIAN ASSISTANT

## 2025-01-31 PROCEDURE — 87340 HEPATITIS B SURFACE AG IA: CPT | Performed by: PHYSICIAN ASSISTANT

## 2025-01-31 PROCEDURE — 99213 OFFICE O/P EST LOW 20 MIN: CPT | Performed by: PHYSICIAN ASSISTANT

## 2025-01-31 PROCEDURE — 82784 ASSAY IGA/IGD/IGG/IGM EACH: CPT | Performed by: PHYSICIAN ASSISTANT

## 2025-01-31 ASSESSMENT — PAIN SCALES - GENERAL: PAINLEVEL_OUTOF10: MILD PAIN (2)

## 2025-01-31 NOTE — Clinical Note
1/31/2025      Yifan Quesada  [address] [facility] [address]      Dear Colleague,    Thank you for referring your patient, Yifan Quesada, to the [facility]. Please see a copy of my visit note below.    Hepatology Clinic note  Yifan Quesada   Date of Birth 1975    REASON FOR CONSULTATION: ***  REFERRING PROVIDER:***         Assessment/plan:   Yifan Quesada is a 49 year old male with History of elevated ALT/AST and imaging findings of hepatic steatosis. Risk factors for fatty liver disease includes: obesity, HTN, hyperlipidemia, hypothryoidism, diabetes, JOHNNIE. Transaminases have been historically ***. Liver function also normal without stigmata of advanced liver disease. Given improvement in transaminases, do not feel that further hepatobiliary work-up indicated at this time.    #Metabolic associated fatty liver disease (MALFD):   - Patient has multiple risk factors making them high risk for on-going fibrosis. We discussed the pathophysiology and natural history of nonalcoholic fatty liver disease and cirrhosis.   -Recommend repeating a fibrosis scan to evaluate any fibrosis in 1-2 years after a period of weight loss.   - Recommend yearly FIB-4 labs with PCP. FIB-4 Calculation: 1.05 at 1/31/2025  7:49 AM  - Recommend slow gradual weight loss. Discussed how a weight loss of 3-5% can show improvement on steatosis and weight loss of 7-10% can show improvement on fibrosis on histology.   - Maintain good control of cholesterol (No contraindication to starting a statin with LFT elevations)   - Optimize blood glucose as needed. Could consider GLP-1 inhibitor for both insulin resistance and help with weight loss  - Improve nutrition: continue limiting carbohydrates, especially simple carbohydrates, and following Mediterranean eating patten  - Increase physical activity as able, ideally 150 minutes weekly  - Limit alcohol intake to not more than 3 drinks a week    # Will also rule  out genetic and autoimmune causes of hepatobiliary disease.   Labs: Hep B, Hep C, Iron panel, alpha-1-antitrypsin deficiency, BRANDON, F-actin, TTG/IgA       - Follow up with Hepatology in six months       Betsy Cam PA-C   [facility]     -----------------------------------------------------       HPI:   Yifan Quesada is a 49 year old male  presenting for the evaluation of elevated LFT's.     LFT's were first noticed to be abnormal in     Treated with doxycyline in November for cellulitis.     Weight is down   Started on plaquenil , able to move again  Zepbound - second     Finish off what have .   Working out more.   Has been doing intermittent fasting for the past few years. (Fasting for 16/8 hour, 20/4, 12/12).   Eat a lot of chicken, tacos/fajitas and casseroles/jambablya. Has always ate pretty healthy.   Diagnosed with RA.      - 2.5 L nightly     Patient denies jaundice, lower extremity edema, abdominal distension or confusion.    Patient also denies melena, hematochezia or hematemesis.  Patient denies weight loss, fevers, sweats or chills.    PMH:    has a past medical history of ED (erectile dysfunction), GERD (gastroesophageal reflux disease), Hypertension, Metabolic syndrome, Tendonitis, bicipital, and White coat hypertension.     SMH:    has a past surgical history that includes .nasal fracture repair and Nasal Fracture Surgery.     Medications:   doxycycline hyclate  ezetimibe  hydroxychloroquine  ibuprofen  METAMUCIL PO  MULTIPLE VITAMIN PO  niacin  olmesartan-hydrochlorothiazide  omeprazole  Repatha SureClick Soaj  tadalafil  tirzepatide-Weight Management     No previous tobacco use. In regards to alcohol, might have a few drinks a month. No previous IV/IN drug use.    Currently works in the [employer]. Patient currently lives with significant other Ebony. No known liver disease or liver disease or liver cancer.     F-actin  Iron panel   Ttg    Previous work-up:   Lab Results    Component Value Date    HCVAB Nonreactive 2022    AMISH Negative 2024    TSH 1.86 2022    CHOL 162 2024    HDL 37 (L) 2024    LDL 89 2024    TRIG 179 (H) 2024    A1C 5.5 2022      Recent Labs   Lab Test 25  0749 23  1144 22  1010 22  1109 21  0906 10/21/20  0754   ALKPHOS 52 60 54 60 53  --    ALT 43 103* 56* 139* 70* 63*   AST 31 56* 29 60* 31  --    BILITOTAL 0.7 0.7 0.5 1.0 0.7  --              Allergies:     Allergies   Allergen Reactions    Statins Muscle Pain (Myalgia)     Severe symptoms while on crestor (statin)    Famotidine Cramps and Muscle Pain (Myalgia)    Cefazolin Rash    Ciprofloxacin Rash    Clavulanic Acid Rash    Doxycycline Rash    Levofloxacin Rash    Penicillins Rash    Sulfamethizole Rash    Zithromax [Azithromycin] Rash            Social History:     Social History     Socioeconomic History    Marital status: Single     Spouse name: Not on file    Number of children: Not on file    Years of education: Not on file    Highest education level: Not on file   Occupational History    Occupation: [employer] nurse     Employer: [employer]   Tobacco Use    Smoking status: Former     Current packs/day: 0.00     Types: Cigarettes, Clove cigarettes or kreteks     Start date: 1994     Quit date: 1995     Years since quittin.8    Smokeless tobacco: Never    Tobacco comments:     in college   Vaping Use    Vaping status: Never Used   Substance and Sexual Activity    Alcohol use: Yes     Comment: Alcoholic Drinks/day: 2 drinks per month     Drug use: Never    Sexual activity: Yes     Partners: Female     Birth control/protection: I.U.D.   Other Topics Concern    Parent/sibling w/ CABG, MI or angioplasty before 65F 55M? Yes   Social History Narrative    Not on file     Social Drivers of Health     Financial Resource Strain: Low Risk  (7/3/2024)    Financial Resource Strain     Within the past 12 months, have you or your family  members you live with been unable to get utilities (heat, electricity) when it was really needed?: No   Food Insecurity: Low Risk  (7/3/2024)    Food Insecurity     Within the past 12 months, did you worry that your food would run out before you got money to buy more?: No     Within the past 12 months, did the food you bought just not last and you didn t have money to get more?: No   Transportation Needs: Low Risk  (7/3/2024)    Transportation Needs     Within the past 12 months, has lack of transportation kept you from medical appointments, getting your medicines, non-medical meetings or appointments, work, or from getting things that you need?: No   Physical Activity: Insufficiently Active (7/3/2024)    Exercise Vital Sign     Days of Exercise per Week: 3 days     Minutes of Exercise per Session: 30 min   Stress: No Stress Concern Present (7/3/2024)    [place] [employer] - Occupational Stress Questionnaire     Feeling of Stress : Only a little   Social Connections: Unknown (7/3/2024)    Social Connection and Isolation Panel [NHANES]     Frequency of Communication with Friends and Family: Not on file     Frequency of Social Gatherings with Friends and Family: Once a week     Attends Caodaism Services: Not on file     Active Member of Clubs or Organizations: Not on file     Attends Club or Organization Meetings: Not on file     Marital Status: Not on file   Interpersonal Safety: Low Risk  (7/8/2024)    Interpersonal Safety     Do you feel physically and emotionally safe where you currently live?: Yes     Within the past 12 months, have you been hit, slapped, kicked or otherwise physically hurt by someone?: No     Within the past 12 months, have you been humiliated or emotionally abused in other ways by your partner or ex-partner?: No   Housing Stability: Low Risk  (7/3/2024)    Housing Stability     Do you have housing? : Yes     Are you worried about losing your housing?: No            Family  "History:     Family History   Problem Relation Age of Onset    Other Cancer Mother         Lung cancer    Cancer Mother         lung     Depression Mother     Schizophrenia Mother     Other - See Comments Mother         low blood sugars     Snoring Mother     Polycythemia Mother     Mental Illness Mother     Hypertension Mother     Coronary Artery Disease Father 48    Snoring Father     Hypertension Father     Obesity Father     Hypertension Sister     Depression Sister     Obesity Sister     Heart Failure Maternal Grandmother     Hypertension Maternal Grandfather     Hypertension Paternal Grandmother     Heart Failure Paternal Grandmother     Breast Cancer Paternal Grandmother     Diabetes Paternal Grandmother     Obesity Paternal Grandmother     Prostate Cancer Paternal Grandfather     Hypertension Paternal Grandfather     Obesity Paternal Grandfather     Colon Cancer No family hx of     Thyroid Cancer No family hx of             Review of Systems:   Gen: See HPI     HEENT: No change in vision or hearing, mouth sores, dysphagia, lymph nodes  Resp: No shortness of breath, coughing, hx of asthma  CV: No chest pain, palpitations, syncope   GI: See HPI  : No dysuria, history of stones, urine color    Skin: No rash; no pruritus or psoriasis  MS: No arthralgias, myalgias, joint swelling  Neuro: No memory changes, confusion, numbness    Heme: No difficulty clotting, bruising, bleeding  Psych:  No anxiety, depression, agitation          Physical Exam:   /77 (BP Location: Right arm, Patient Position: Sitting)   Pulse 81   Ht 1.753 m (5' 9\")   Wt 119.3 kg (263 lb)   SpO2 97%   BMI 38.84 kg/m      Gen: A&Ox3, NAD, well developed  HEENT: non-icteric   Lym- no palpable lymphadenopathy  Abd: soft, NT, ND., no palpable splenomegaly, liver is not palpable.   Ext: no edema, intact pulses.   Skin: No rash, no palmar erythema, telangiectasias or jaundice  Neuro: grossly intact, no asterixis   Psych: appropriate mood and " "affects         Data:   Reviewed in person and significant for:    Lab Results   Component Value Date     07/08/2024     10/24/2016      Lab Results   Component Value Date    POTASSIUM 4.4 07/08/2024    POTASSIUM 4.1 04/29/2022    POTASSIUM 4.3 10/24/2016     Lab Results   Component Value Date    CHLORIDE 102 07/08/2024    CHLORIDE 101 04/29/2022    CHLORIDE 108 10/24/2016     Lab Results   Component Value Date    CO2 27 07/08/2024    CO2 25 04/29/2022    CO2 25 10/24/2016     Lab Results   Component Value Date    BUN 20.4 07/08/2024    BUN 29 04/29/2022    BUN 20 10/24/2016     Lab Results   Component Value Date    CR 1.15 07/08/2024    CR 1.12 10/24/2016       Lab Results   Component Value Date    WBC 6.9 07/08/2024     Lab Results   Component Value Date    HGB 16.8 07/08/2024     Lab Results   Component Value Date    HCT 47.8 07/08/2024     Lab Results   Component Value Date    MCV 87 07/08/2024     Lab Results   Component Value Date     07/08/2024       Lab Results   Component Value Date    AST 56 06/30/2023     Lab Results   Component Value Date     06/30/2023     No results found for: \"BILICONJ\"   Lab Results   Component Value Date    BILITOTAL 0.7 06/30/2023       Lab Results   Component Value Date    ALBUMIN 4.5 06/30/2023    ALBUMIN 4.3 04/29/2022     Lab Results   Component Value Date    PROTTOTAL 7.4 06/30/2023      Lab Results   Component Value Date    ALKPHOS 60 06/30/2023       No results found for: \"INR\"      Imaging:      EXAMINATION:Liver Ultrasound Elastography, 7/5/2023 1:35 PM     COMPARISON: 10/27/2020 abdominal ultrasound     HISTORY: NAFLD (nonalcoholic fatty liver disease)     FINDINGS:      Ultrasound elastography of the liver was performed using a Ortega  ultrasound machine using 10 separate measurements of the liver  parenchyma with patient in supine position. Measurements were obtained  approximately 2 cm beneath Eve's capsule, perpendicular to the  liver " capsule. Images are of satisfactory quality.     The median liver stiffness is: 1.27 m/sec  The mean liver stiffness is: 1.26 m/sec  The interquartile range is: 0.18  IQR/median: 0.14.  (IQR/median value of greater than 0.15 indicates  that a dataset is unreliable)                                                                      IMPRESSION:  The median liver stiffness is 1.27 m/sec. This is a normal  elastography value with low likelihood of severe fibrosis or  cirrhosis.           Again, thank you for allowing me to participate in the care of your patient.        Sincerely,        Betsy Cam PA-C    Electronically signed

## 2025-01-31 NOTE — PROGRESS NOTES
Hepatology Clinic note  Yifan Quesada   Date of Birth 1975    REASON FOR CONSULTATION: ***  REFERRING PROVIDER:***         Assessment/plan:   Yifan Quesada is a 49 year old male with History of elevated ALT/AST and imaging findings of hepatic steatosis. Risk factors for fatty liver disease includes: obesity, HTN, hyperlipidemia, hypothryoidism, diabetes, JOHNNIE. Transaminases have been historically ***. Liver function also normal without stigmata of advanced liver disease. Given improvement in transaminases, do not feel that further hepatobiliary work-up indicated at this time.    #Metabolic associated fatty liver disease (MALFD):   - Patient has multiple risk factors making them high risk for on-going fibrosis. We discussed the pathophysiology and natural history of nonalcoholic fatty liver disease and cirrhosis.   -Recommend repeating a fibrosis scan to evaluate any fibrosis in 1-2 years after a period of weight loss.   - Recommend yearly FIB-4 labs with PCP. FIB-4 Calculation: 1.05 at 1/31/2025  7:49 AM  - Recommend slow gradual weight loss. Discussed how a weight loss of 3-5% can show improvement on steatosis and weight loss of 7-10% can show improvement on fibrosis on histology.   - Maintain good control of cholesterol (No contraindication to starting a statin with LFT elevations)   - Optimize blood glucose as needed. Could consider GLP-1 inhibitor for both insulin resistance and help with weight loss  - Improve nutrition: continue limiting carbohydrates, especially simple carbohydrates, and following Mediterranean eating patten  - Increase physical activity as able, ideally 150 minutes weekly  - Limit alcohol intake to not more than 3 drinks a week    # Will also rule out genetic and autoimmune causes of hepatobiliary disease.   Labs: Hep B, Hep C, Iron panel, alpha-1-antitrypsin deficiency, BRANDON, F-actin, TTG/IgA       - Follow up with Hepatology in six months       Betsy Cam PA-C   [facility]  [place] [facility]     -----------------------------------------------------       HPI:   Yifan Quesada is a 49 year old male  presenting for the evaluation of elevated LFT's.     LFT's were first noticed to be abnormal in     Treated with doxycyline in November for cellulitis.     Weight is down   Started on plaquenil , able to move again  Zepbound - second     Finish off what have .   Working out more.   Has been doing intermittent fasting for the past few years. (Fasting for 16/8 hour, 20/4, 12/12).   Eat a lot of chicken, tacos/fajitas and casseroles/jambablya. Has always ate pretty healthy.   Diagnosed with RA.      - 2.5 L nightly     Patient denies jaundice, lower extremity edema, abdominal distension or confusion.    Patient also denies melena, hematochezia or hematemesis.  Patient denies weight loss, fevers, sweats or chills.    PMH:    has a past medical history of ED (erectile dysfunction), GERD (gastroesophageal reflux disease), Hypertension, Metabolic syndrome, Tendonitis, bicipital, and White coat hypertension.     SMH:    has a past surgical history that includes .nasal fracture repair and Nasal Fracture Surgery.     Medications:   doxycycline hyclate  ezetimibe  hydroxychloroquine  ibuprofen  METAMUCIL PO  MULTIPLE VITAMIN PO  niacin  olmesartan-hydrochlorothiazide  omeprazole  Repatha SureClick Soaj  tadalafil  tirzepatide-Weight Management     No previous tobacco use. In regards to alcohol, might have a few drinks a month. No previous IV/IN drug use.    Currently works in the [employer]. Patient currently lives with significant other Ebony. No known liver disease or liver disease or liver cancer.     F-actin  Iron panel   Ttg    Previous work-up:   Lab Results   Component Value Date    HCVAB Nonreactive 04/29/2022    AMISH Negative 07/08/2024    TSH 1.86 04/29/2022    CHOL 162 07/08/2024    HDL 37 (L) 07/08/2024    LDL 89 07/08/2024    TRIG 179 (H) 07/08/2024    A1C 5.5 04/29/2022      Recent Labs   Lab  Test 25  0749 23  1144 22  1010 22  1109 21  0906 10/21/20  0754   ALKPHOS 52 60 54 60 53  --    ALT 43 103* 56* 139* 70* 63*   AST 31 56* 29 60* 31  --    BILITOTAL 0.7 0.7 0.5 1.0 0.7  --              Allergies:     Allergies   Allergen Reactions    Statins Muscle Pain (Myalgia)     Severe symptoms while on crestor (statin)    Famotidine Cramps and Muscle Pain (Myalgia)    Cefazolin Rash    Ciprofloxacin Rash    Clavulanic Acid Rash    Doxycycline Rash    Levofloxacin Rash    Penicillins Rash    Sulfamethizole Rash    Zithromax [Azithromycin] Rash            Social History:     Social History     Socioeconomic History    Marital status: Single     Spouse name: Not on file    Number of children: Not on file    Years of education: Not on file    Highest education level: Not on file   Occupational History    Occupation: ICU nurse     Employer: [employer]   Tobacco Use    Smoking status: Former     Current packs/day: 0.00     Types: Cigarettes, Clove cigarettes or kreteks     Start date: 1994     Quit date: 1995     Years since quittin.8    Smokeless tobacco: Never    Tobacco comments:     in college   Vaping Use    Vaping status: Never Used   Substance and Sexual Activity    Alcohol use: Yes     Comment: Alcoholic Drinks/day: 2 drinks per month     Drug use: Never    Sexual activity: Yes     Partners: Female     Birth control/protection: I.U.D.   Other Topics Concern    Parent/sibling w/ CABG, MI or angioplasty before 65F 55M? Yes   Social History Narrative    Not on file     Social Drivers of Health     Financial Resource Strain: Low Risk  (7/3/2024)    Financial Resource Strain     Within the past 12 months, have you or your family members you live with been unable to get utilities (heat, electricity) when it was really needed?: No   Food Insecurity: Low Risk  (7/3/2024)    Food Insecurity     Within the past 12 months, did you worry that your food would run out before you  got money to buy more?: No     Within the past 12 months, did the food you bought just not last and you didn t have money to get more?: No   Transportation Needs: Low Risk  (7/3/2024)    Transportation Needs     Within the past 12 months, has lack of transportation kept you from medical appointments, getting your medicines, non-medical meetings or appointments, work, or from getting things that you need?: No   Physical Activity: Insufficiently Active (7/3/2024)    Exercise Vital Sign     Days of Exercise per Week: 3 days     Minutes of Exercise per Session: 30 min   Stress: No Stress Concern Present (7/3/2024)    [employer] [place] [employer] - Occupational Stress Questionnaire     Feeling of Stress : Only a little   Social Connections: Unknown (7/3/2024)    Social Connection and Isolation Panel [NHANES]     Frequency of Communication with Friends and Family: Not on file     Frequency of Social Gatherings with Friends and Family: Once a week     Attends Evangelical Services: Not on file     Active Member of Clubs or Organizations: Not on file     Attends Club or Organization Meetings: Not on file     Marital Status: Not on file   Interpersonal Safety: Low Risk  (7/8/2024)    Interpersonal Safety     Do you feel physically and emotionally safe where you currently live?: Yes     Within the past 12 months, have you been hit, slapped, kicked or otherwise physically hurt by someone?: No     Within the past 12 months, have you been humiliated or emotionally abused in other ways by your partner or ex-partner?: No   Housing Stability: Low Risk  (7/3/2024)    Housing Stability     Do you have housing? : Yes     Are you worried about losing your housing?: No            Family History:     Family History   Problem Relation Age of Onset    Other Cancer Mother         Lung cancer    Cancer Mother         lung     Depression Mother     Schizophrenia Mother     Other - See Comments Mother         low blood sugars      "Snoring Mother     Polycythemia Mother     Mental Illness Mother     Hypertension Mother     Coronary Artery Disease Father 48    Snoring Father     Hypertension Father     Obesity Father     Hypertension Sister     Depression Sister     Obesity Sister     Heart Failure Maternal Grandmother     Hypertension Maternal Grandfather     Hypertension Paternal Grandmother     Heart Failure Paternal Grandmother     Breast Cancer Paternal Grandmother     Diabetes Paternal Grandmother     Obesity Paternal Grandmother     Prostate Cancer Paternal Grandfather     Hypertension Paternal Grandfather     Obesity Paternal Grandfather     Colon Cancer No family hx of     Thyroid Cancer No family hx of             Review of Systems:   Gen: See HPI     HEENT: No change in vision or hearing, mouth sores, dysphagia, lymph nodes  Resp: No shortness of breath, coughing, hx of asthma  CV: No chest pain, palpitations, syncope   GI: See HPI  : No dysuria, history of stones, urine color    Skin: No rash; no pruritus or psoriasis  MS: No arthralgias, myalgias, joint swelling  Neuro: No memory changes, confusion, numbness    Heme: No difficulty clotting, bruising, bleeding  Psych:  No anxiety, depression, agitation          Physical Exam:   /77 (BP Location: Right arm, Patient Position: Sitting)   Pulse 81   Ht 1.753 m (5' 9\")   Wt 119.3 kg (263 lb)   SpO2 97%   BMI 38.84 kg/m      Gen: A&Ox3, NAD, well developed  HEENT: non-icteric   Lym- no palpable lymphadenopathy  Abd: soft, NT, ND., no palpable splenomegaly, liver is not palpable.   Ext: no edema, intact pulses.   Skin: No rash, no palmar erythema, telangiectasias or jaundice  Neuro: grossly intact, no asterixis   Psych: appropriate mood and affects         Data:   Reviewed in person and significant for:    Lab Results   Component Value Date     07/08/2024     10/24/2016      Lab Results   Component Value Date    POTASSIUM 4.4 07/08/2024    POTASSIUM 4.1 04/29/2022 " "   POTASSIUM 4.3 10/24/2016     Lab Results   Component Value Date    CHLORIDE 102 07/08/2024    CHLORIDE 101 04/29/2022    CHLORIDE 108 10/24/2016     Lab Results   Component Value Date    CO2 27 07/08/2024    CO2 25 04/29/2022    CO2 25 10/24/2016     Lab Results   Component Value Date    BUN 20.4 07/08/2024    BUN 29 04/29/2022    BUN 20 10/24/2016     Lab Results   Component Value Date    CR 1.15 07/08/2024    CR 1.12 10/24/2016       Lab Results   Component Value Date    WBC 6.9 07/08/2024     Lab Results   Component Value Date    HGB 16.8 07/08/2024     Lab Results   Component Value Date    HCT 47.8 07/08/2024     Lab Results   Component Value Date    MCV 87 07/08/2024     Lab Results   Component Value Date     07/08/2024       Lab Results   Component Value Date    AST 56 06/30/2023     Lab Results   Component Value Date     06/30/2023     No results found for: \"BILICONJ\"   Lab Results   Component Value Date    BILITOTAL 0.7 06/30/2023       Lab Results   Component Value Date    ALBUMIN 4.5 06/30/2023    ALBUMIN 4.3 04/29/2022     Lab Results   Component Value Date    PROTTOTAL 7.4 06/30/2023      Lab Results   Component Value Date    ALKPHOS 60 06/30/2023       No results found for: \"INR\"      Imaging:      EXAMINATION:Liver Ultrasound Elastography, 7/5/2023 1:35 PM     COMPARISON: 10/27/2020 abdominal ultrasound     HISTORY: NAFLD (nonalcoholic fatty liver disease)     FINDINGS:      Ultrasound elastography of the liver was performed using a Ortega  ultrasound machine using 10 separate measurements of the liver  parenchyma with patient in supine position. Measurements were obtained  approximately 2 cm beneath Eve's capsule, perpendicular to the  liver capsule. Images are of satisfactory quality.     The median liver stiffness is: 1.27 m/sec  The mean liver stiffness is: 1.26 m/sec  The interquartile range is: 0.18  IQR/median: 0.14.  (IQR/median value of greater than 0.15 indicates  that " a dataset is unreliable)                                                                      IMPRESSION:  The median liver stiffness is 1.27 m/sec. This is a normal  elastography value with low likelihood of severe fibrosis or  cirrhosis.

## 2025-01-31 NOTE — NURSING NOTE
"Chief Complaint   Patient presents with    Consult     Complete RM. Fatty liver     /77 (BP Location: Right arm, Patient Position: Sitting)   Pulse 81   Ht 1.753 m (5' 9\")   Wt 119.3 kg (263 lb)   SpO2 97%   BMI 38.84 kg/m    Gretchen Heaton CMA on 1/31/2025 at 8:29 AM    "

## 2025-02-04 LAB
GLIADIN IGA SER-ACNC: 2 U/ML
GLIADIN IGG SER-ACNC: 0.8 U/ML
TTG IGA SER-ACNC: 0.4 U/ML
TTG IGG SER-ACNC: 0.8 U/ML

## 2025-03-17 ENCOUNTER — TRANSFERRED RECORDS (OUTPATIENT)
Dept: HEALTH INFORMATION MANAGEMENT | Facility: CLINIC | Age: 50
End: 2025-03-17
Payer: COMMERCIAL

## 2025-05-03 ENCOUNTER — PATIENT OUTREACH (OUTPATIENT)
Dept: CARE COORDINATION | Facility: CLINIC | Age: 50
End: 2025-05-03
Payer: COMMERCIAL

## 2025-06-16 ENCOUNTER — TRANSFERRED RECORDS (OUTPATIENT)
Dept: HEALTH INFORMATION MANAGEMENT | Facility: CLINIC | Age: 50
End: 2025-06-16
Payer: COMMERCIAL

## 2025-06-17 ENCOUNTER — TRANSFERRED RECORDS (OUTPATIENT)
Dept: ADMINISTRATIVE | Facility: CLINIC | Age: 50
End: 2025-06-17
Payer: COMMERCIAL

## 2025-06-17 DIAGNOSIS — K21.9 GASTROESOPHAGEAL REFLUX DISEASE, UNSPECIFIED WHETHER ESOPHAGITIS PRESENT: ICD-10-CM

## 2025-06-17 RX ORDER — OMEPRAZOLE 20 MG/1
20 CAPSULE, DELAYED RELEASE ORAL DAILY
Qty: 90 CAPSULE | Refills: 0 | Status: SHIPPED | OUTPATIENT
Start: 2025-06-17

## 2025-06-19 DIAGNOSIS — I10 ESSENTIAL HYPERTENSION: ICD-10-CM

## 2025-06-19 RX ORDER — OLMESARTAN MEDOXOMIL AND HYDROCHLOROTHIAZIDE 40/25 40; 25 MG/1; MG/1
1 TABLET ORAL DAILY
Qty: 90 TABLET | Refills: 1 | Status: SHIPPED | OUTPATIENT
Start: 2025-06-19

## 2025-06-19 NOTE — PROCEDURES
[facility]   [address], [address], [place] [address]     Patient Name: Yifan Quesada  Gender:  Male  Exam Date: 06/17/2025 Visit Number:  [id number]  Age: 49 Years YOB: 1975  Attending MD: Naren Villela MD Medical Record#:  [id number]    Procedure: Colonoscopy   Indications: Previous advanced adenomatous polyp(s)      Referring MD: Referral Self  Primary MD:      Devang Sepulveda MD  Medications: Intra Procedure Medications:   Patient received monitored anesthesia care.     Complications: No immediate complications  ______________________________________________________________________________  Procedure:   An examination of the heart and lungs was performed and found to be within acceptable limits.  .  The patient was therefore deemed a reasonable candidate for endoscopy and sedation.   The risks and benefits of the procedure were explained to the patient.After obtaining informed consent, the patient received monitored anesthesia care and I passed the scope   without difficulty via the rectum to the ileum.  The appendiceal orifice and ic valve were identified.  The scope was retroflexed during the examination  The quality of the prep was fair  to good  (Anand/Gat).    This was a complete examination throughout the entire colon.    Findings:    Normal finding.  Location - ileum.    Polyp location: rectum.  Quantity: 1.  Size: 2 mm.  Polyp shape:  sessile.         Maneuver: polypectomy was performed with a cold biopsy forceps.       Removal:  complete.  Retrieval: complete.  Bleeding: none.    Polyp location: cecum.  Quantity: 1.  Size: 2 mm.  Polyp shape: sessile.         Maneuver: polypectomy was performed with a  cold biopsy forceps.       Removal: complete.  Retrieval: complete.  Bleeding: none.    Polyp location: descending colon.  Quantity: 2.  Size:  2 mm, 2 mm.   Polyp shape: sessile.         Maneuver: polypectomy was performed with a cold biopsy forceps.       Removal:  complete.  Retrieval: complete.  Bleeding: none.      Diverticulosis.  Location: - sigmoid.        Quantity:  few.  No inflammation present.    Hemorrhoids.  Small internal hemorrhoids without bleeding.  Remainder of the exam is normal.  Comments: Irrigation and suctioning were performed to improve visualization    Impression:  Colorectal polyps  Diverticulosis of colon without diverticulitis  Hemorrhoids, internal    Preliminary Plan:  The patient and their physician will receive a copy of the pathology report as well as pathology-based recommendations for future screening or surveillance.  Pathology Results:  A: RECTUM, POLYP:           1. Hyperplastic polyp      B: COLON, CECUM, POLYP:           1. Tubular adenoma           2. Negative for high grade dysplasia           3. Per the colonoscopy report:               a. Polyp size: 2 mm               b. Resection: Complete               c. Retrieval: Complete      C: COLON, DESCENDING, POLYPS:           1. Tubular adenoma (1) and normal colonic mucosa (endoscopically 2 polyps)           2. Negative for high grade dysplasia           3. Per the colonoscopy report:               a. Polyp sizes: 2 mm               b. Resection: Complete               c. Retrieval: Complete      MICROSCOPIC  A: Performed   B: Performed   C: Performed     Electronically signed by: Norm Mccoy MD    Interpreted at [facility], [address] [place] [id number]    Orders    Instruction(s)/Education:  Instruction/Education Timeframe Assessment   Colon Polyps  K63.5   Diverticulosis/Diverticulitis  K63.5   Hemorrhoids  K63.5       Final Plan:  Repeat colonoscopy in 5 years for Polyp surveillance.    We will attempt to contact you at appropriate intervals via [place] mail.  We may not be able to find you or contact you at that time, therefore you should know that the responsibility for following our recommendation rests with you.  If you don't hear from  us at the time your procedure is due, please contact our office to schedule an appointment.  If your contact information should change, please contact our office so that we can update your record.      _Electronically signed by:___________________  Naren Villela MD                 06/17/2025    cc: Devang Sepulveda MD

## 2025-07-07 ENCOUNTER — TELEPHONE (OUTPATIENT)
Dept: CARDIOLOGY | Facility: CLINIC | Age: 50
End: 2025-07-07
Payer: COMMERCIAL

## 2025-07-07 DIAGNOSIS — Z78.9 STATIN INTOLERANCE: ICD-10-CM

## 2025-07-07 DIAGNOSIS — E88.810 METABOLIC SYNDROME X: ICD-10-CM

## 2025-07-07 RX ORDER — EVOLOCUMAB 140 MG/ML
140 INJECTION, SOLUTION SUBCUTANEOUS
Qty: 6 ML | Refills: 2 | Status: SHIPPED | OUTPATIENT
Start: 2025-07-07

## 2025-07-21 ENCOUNTER — TRANSFERRED RECORDS (OUTPATIENT)
Dept: HEALTH INFORMATION MANAGEMENT | Facility: CLINIC | Age: 50
End: 2025-07-21
Payer: COMMERCIAL

## 2025-07-22 SDOH — HEALTH STABILITY: PHYSICAL HEALTH: ON AVERAGE, HOW MANY DAYS PER WEEK DO YOU ENGAGE IN MODERATE TO STRENUOUS EXERCISE (LIKE A BRISK WALK)?: 5 DAYS

## 2025-07-22 SDOH — HEALTH STABILITY: PHYSICAL HEALTH: ON AVERAGE, HOW MANY MINUTES DO YOU ENGAGE IN EXERCISE AT THIS LEVEL?: 60 MIN

## 2025-07-22 ASSESSMENT — SOCIAL DETERMINANTS OF HEALTH (SDOH): HOW OFTEN DO YOU GET TOGETHER WITH FRIENDS OR RELATIVES?: PATIENT DECLINED

## 2025-07-29 ENCOUNTER — OFFICE VISIT (OUTPATIENT)
Dept: FAMILY MEDICINE | Facility: CLINIC | Age: 50
End: 2025-07-29
Attending: FAMILY MEDICINE
Payer: COMMERCIAL

## 2025-07-29 VITALS
SYSTOLIC BLOOD PRESSURE: 102 MMHG | DIASTOLIC BLOOD PRESSURE: 66 MMHG | HEART RATE: 79 BPM | RESPIRATION RATE: 18 BRPM | HEIGHT: 69 IN | WEIGHT: 267.4 LBS | OXYGEN SATURATION: 95 % | TEMPERATURE: 97.7 F | BODY MASS INDEX: 39.6 KG/M2

## 2025-07-29 DIAGNOSIS — Z00.00 ROUTINE GENERAL MEDICAL EXAMINATION AT A HEALTH CARE FACILITY: Primary | ICD-10-CM

## 2025-07-29 DIAGNOSIS — N18.31 STAGE 3A CHRONIC KIDNEY DISEASE (H): ICD-10-CM

## 2025-07-29 DIAGNOSIS — I10 ESSENTIAL HYPERTENSION: ICD-10-CM

## 2025-07-29 DIAGNOSIS — E66.813 CLASS 3 SEVERE OBESITY DUE TO EXCESS CALORIES WITH SERIOUS COMORBIDITY AND BODY MASS INDEX (BMI) OF 40.0 TO 44.9 IN ADULT (H): ICD-10-CM

## 2025-07-29 DIAGNOSIS — E88.810 METABOLIC SYNDROME X: ICD-10-CM

## 2025-07-29 DIAGNOSIS — N52.9 ERECTILE DYSFUNCTION, UNSPECIFIED ERECTILE DYSFUNCTION TYPE: ICD-10-CM

## 2025-07-29 DIAGNOSIS — E78.00 HYPERCHOLESTEREMIA: ICD-10-CM

## 2025-07-29 DIAGNOSIS — M05.80 POLYARTHRITIS WITH POSITIVE RHEUMATOID FACTOR (H): ICD-10-CM

## 2025-07-29 DIAGNOSIS — Z12.5 SCREENING FOR PROSTATE CANCER: ICD-10-CM

## 2025-07-29 DIAGNOSIS — K76.0 NAFLD (NONALCOHOLIC FATTY LIVER DISEASE): ICD-10-CM

## 2025-07-29 DIAGNOSIS — R73.03 PREDIABETES: ICD-10-CM

## 2025-07-29 LAB
ALBUMIN SERPL BCG-MCNC: 4.5 G/DL (ref 3.5–5.2)
ALP SERPL-CCNC: 60 U/L (ref 40–150)
ALT SERPL W P-5'-P-CCNC: 26 U/L (ref 0–70)
ANION GAP SERPL CALCULATED.3IONS-SCNC: 15 MMOL/L (ref 7–15)
AST SERPL W P-5'-P-CCNC: 30 U/L (ref 0–45)
BILIRUB SERPL-MCNC: 1.2 MG/DL
BUN SERPL-MCNC: 50.2 MG/DL (ref 6–20)
CALCIUM SERPL-MCNC: 8.9 MG/DL (ref 8.8–10.4)
CHLORIDE SERPL-SCNC: 94 MMOL/L (ref 98–107)
CHOLEST SERPL-MCNC: 105 MG/DL
CK SERPL-CCNC: 687 U/L (ref 39–308)
CREAT SERPL-MCNC: 3.49 MG/DL (ref 0.67–1.17)
CREAT UR-MCNC: 371 MG/DL
CYSTATIN C (ROCHE): 2 MG/L (ref 0.6–1)
EGFRCR SERPLBLD CKD-EPI 2021: 20 ML/MIN/1.73M2
FASTING STATUS PATIENT QL REPORTED: YES
FASTING STATUS PATIENT QL REPORTED: YES
GFR/BSA.PRED SERPLBLD CYS-BASED-ARV: 32 ML/MIN/1.73M2
GLUCOSE SERPL-MCNC: 128 MG/DL (ref 70–99)
HCO3 SERPL-SCNC: 26 MMOL/L (ref 22–29)
HDLC SERPL-MCNC: 42 MG/DL
HOLD SPECIMEN: NORMAL
HOLD SPECIMEN: NORMAL
LDLC SERPL CALC-MCNC: 25 MG/DL
MICROALBUMIN UR-MCNC: 27.8 MG/L
MICROALBUMIN/CREAT UR: 7.49 MG/G CR (ref 0–17)
NONHDLC SERPL-MCNC: 63 MG/DL
POTASSIUM SERPL-SCNC: 3.8 MMOL/L (ref 3.4–5.3)
PROT SERPL-MCNC: 7.2 G/DL (ref 6.4–8.3)
PSA SERPL DL<=0.01 NG/ML-MCNC: 0.64 NG/ML (ref 0–3.5)
SODIUM SERPL-SCNC: 135 MMOL/L (ref 135–145)
TRIGL SERPL-MCNC: 191 MG/DL

## 2025-07-29 PROCEDURE — 99396 PREV VISIT EST AGE 40-64: CPT | Performed by: FAMILY MEDICINE

## 2025-07-29 PROCEDURE — 82610 CYSTATIN C: CPT | Performed by: FAMILY MEDICINE

## 2025-07-29 PROCEDURE — 82043 UR ALBUMIN QUANTITATIVE: CPT | Performed by: FAMILY MEDICINE

## 2025-07-29 PROCEDURE — 3078F DIAST BP <80 MM HG: CPT | Performed by: FAMILY MEDICINE

## 2025-07-29 PROCEDURE — 3074F SYST BP LT 130 MM HG: CPT | Performed by: FAMILY MEDICINE

## 2025-07-29 PROCEDURE — 82570 ASSAY OF URINE CREATININE: CPT | Performed by: FAMILY MEDICINE

## 2025-07-29 PROCEDURE — 99213 OFFICE O/P EST LOW 20 MIN: CPT | Mod: 25 | Performed by: FAMILY MEDICINE

## 2025-07-29 PROCEDURE — 36415 COLL VENOUS BLD VENIPUNCTURE: CPT | Performed by: FAMILY MEDICINE

## 2025-07-29 PROCEDURE — 82550 ASSAY OF CK (CPK): CPT | Performed by: FAMILY MEDICINE

## 2025-07-29 PROCEDURE — G0103 PSA SCREENING: HCPCS | Performed by: FAMILY MEDICINE

## 2025-07-29 PROCEDURE — 80053 COMPREHEN METABOLIC PANEL: CPT | Performed by: FAMILY MEDICINE

## 2025-07-29 PROCEDURE — 80061 LIPID PANEL: CPT | Performed by: FAMILY MEDICINE

## 2025-07-29 PROCEDURE — G2211 COMPLEX E/M VISIT ADD ON: HCPCS | Performed by: FAMILY MEDICINE

## 2025-07-29 RX ORDER — OLMESARTAN MEDOXOMIL AND HYDROCHLOROTHIAZIDE 40/25 40; 25 MG/1; MG/1
1 TABLET ORAL DAILY
Qty: 90 TABLET | Refills: 3 | Status: SHIPPED | OUTPATIENT
Start: 2025-07-29

## 2025-07-29 RX ORDER — TADALAFIL 5 MG/1
TABLET ORAL
Qty: 180 TABLET | Refills: 3 | Status: SHIPPED | OUTPATIENT
Start: 2025-07-29

## 2025-07-29 ASSESSMENT — ENCOUNTER SYMPTOMS
SORE THROAT: 0
CHILLS: 0
FEVER: 0
PALPITATIONS: 0
HEMATURIA: 0
EYE PAIN: 0
COUGH: 0
SEIZURES: 0
VOMITING: 0
DYSURIA: 0
ARTHRALGIAS: 0
COLOR CHANGE: 0
SHORTNESS OF BREATH: 0
ABDOMINAL PAIN: 0
BACK PAIN: 0

## 2025-07-29 NOTE — ASSESSMENT & PLAN NOTE
Check LFTs.  If elevated add platelet and calculate fib 4.  Weight generally going down which is favorable.  Orders:    Lipid panel reflex to direct LDL Fasting; Future

## 2025-07-29 NOTE — ASSESSMENT & PLAN NOTE
Creatinine and Cystatin C are discordant.  He is a muscular build.  Continue to follow both.  Orders:    Albumin Random Urine Quantitative with Creat Ratio; Future    Cystatin C with GFR; Future    CK total; Future

## 2025-07-29 NOTE — ASSESSMENT & PLAN NOTE
Continue daily dose tadalafil.  Orders:    tadalafil (CIALIS) 5 MG tablet; TAKE 1/2 TO 1 TABLET BY MOUTH DAILY

## 2025-07-29 NOTE — PATIENT INSTRUCTIONS
"Patient Education   Preventive Care Advice   This is general advice we often give to help people stay healthy. Your care team may have specific advice just for you. Please talk to your care team about your own preventive care needs.  Lifestyle  Exercise at least 150 minutes each week (30 minutes a day, 5 days a week).  Do muscle strengthening activities 2 days a week. These help control your weight and prevent disease.  No smoking.  Wear sunscreen to prevent skin cancer.  Take time with family and friends.  Have your home tested for radon every 2 to 5 years. Radon is a colorless, odorless gas that can harm your lungs. To learn more, go to www.health.[facility].[place].us and search for \"Radon in Homes.\"  Keep guns unloaded and locked up in a safe place like a safe or gun vault, or, use a gun lock and hide the keys. Always lock away bullets separately. To learn more, visit [place].gov and search for \"safe gun storage.\"  Nutrition  Eat 5 or more servings of fruits and vegetables each day.  Try wheat bread, brown rice and whole grain pasta (instead of white bread, rice, and pasta).  Get enough calcium and vitamin D. Check the label on foods and aim for 100% of the RDA (recommended daily allowance).  Regular exams  Have a dental exam and cleaning every 6 months.  Older adults: Ask your care team how often to have memory testing.  See your health care team every year to talk about:  Any changes in your health.  Any medicines your care team has prescribed.  Preventive care, family planning, and ways to prevent chronic diseases.  Shots (vaccines)   HPV shots (up to age 26), if you've never had them before.  Hepatitis B shots (up to age 59), if you've never had them before.  COVID-19 shot: Get this shot when it's due.  Flu shot: Get a flu shot every year.  Tetanus shot: Get a tetanus shot every 10 years.  Pneumococcal, hepatitis A, and RSV shots: Ask your care team if you need these based on your risk.  Shingles shot (for age 50 and " up).  General health tests  Diabetes screening:  Starting at age 35, Get screened for diabetes at least every 3 years.  If you are younger than age 35, ask your care team if you should be screened for diabetes.  Cholesterol test: At age 39, start having a cholesterol test every 5 years, or more often if advised.  Bone density scan (DEXA): At age 50, ask your care team if you should have this scan for osteoporosis (brittle bones).  Hepatitis C: Get tested at least once in your life.  Abdominal aortic aneurysm screening: Talk to your doctor about having this screening if you:  Have ever smoked; and  Are biologically male; and  Are between the ages of 65 and 75.  STIs (sexually transmitted infections)  Before age 24: Ask your care team if you should be screened for STIs.  After age 24: Get screened for STIs if you're at risk. You are at risk for STIs (including HIV) if:  You are sexually active with more than one person.  You don't use condoms every time.  You or a partner was diagnosed with a sexually transmitted infection.  If you are at risk for HIV, ask about PrEP medicine to prevent HIV.  Get tested for HIV at least once in your life, whether you are at risk for HIV or not.  Cancer screening tests  Cervical cancer screening: If you have a cervix, begin getting regular cervical cancer screening tests at age 21. Most people who have regular screenings with normal results can stop after age 65. Talk about this with your provider.  Breast cancer scan (mammogram): If you've ever had breasts, begin having regular mammograms starting at age 40. This is a scan to check for breast cancer.  Colon cancer screening: It is important to start screening for colon cancer at age 45.  Have a colonoscopy test every 10 years (or more often if you're at risk) Or, ask your provider about stool tests like a FIT test every year or Cologuard test every 3 years.  To learn more about your testing options, visit:  www.[employer]/363686.pdf.  For help making a decision, visit: andie.joe/td43923.  Prostate cancer screening test: If you have a prostate and are age 55 to 69, ask your provider if you would benefit from a yearly prostate cancer screening test.  Lung cancer screening: If you are a current or former smoker age 50 to 80, ask your care team if ongoing lung cancer screenings are right for you.    For informational purposes only. Not to replace the advice of your health care provider. Copyright   2023 [facility]. All rights reserved. Clinically reviewed by the [facility]. [place] [id number] - REV 6/25.

## 2025-07-29 NOTE — ASSESSMENT & PLAN NOTE
"Annual exam.  He does not feel well today and he attributes his symptoms to \"heatstroke.\"  For this reason, he would like to defer administration of Prevnar but he would get this medicine in the near future.  Otherwise up-to-date with preventative health recommendations.  Chronic disease as discussed elsewhere.       "

## 2025-07-29 NOTE — ASSESSMENT & PLAN NOTE
Primary prevention heart disease.  Currently on Repatha and Zetia.  Due for lipid panel.  Orders:    Comprehensive metabolic panel (BMP + Alb, Alk Phos, ALT, AST, Total. Bili, TP); Future    Extra Purple Top EDTA (LAB USE ONLY)    Extra Red Top Tube (LAB USE ONLY); Future

## 2025-07-29 NOTE — ASSESSMENT & PLAN NOTE
Patient is not confident that he is being optimally treated although his symptoms have improved in general.  We reviewed some past complaints including chronic fatigue which, in hindsight, may have been episodes of rheumatologic phenomenon.  Continue to follow with rheumatology.  Discussed vaccines.

## 2025-07-29 NOTE — PROGRESS NOTES
"Preventive Care Visit  [facility]  Devang Sepulveda MD, Family Medicine  Jul 29, 2025      Assessment & Plan   Assessment & Plan  Routine general medical examination at a health care facility  Annual exam.  He does not feel well today and he attributes his symptoms to \"heatstroke.\"  For this reason, he would like to defer administration of Prevnar but he would get this medicine in the near future.  Otherwise up-to-date with preventative health recommendations.  Chronic disease as discussed elsewhere.       Hypercholesteremia  Primary prevention heart disease.  Currently on Repatha and Zetia.  Due for lipid panel.  Orders:    Comprehensive metabolic panel (BMP + Alb, Alk Phos, ALT, AST, Total. Bili, TP); Future    Extra Purple Top EDTA (LAB USE ONLY)    Extra Red Top Tube (LAB USE ONLY); Future    Stage 3a chronic kidney disease (H)  Creatinine and Cystatin C are discordant.  He is a muscular build.  Continue to follow both.  Orders:    Albumin Random Urine Quantitative with Creat Ratio; Future    Cystatin C with GFR; Future    CK total; Future    Metabolic syndrome X  Labs as ordered.  Orders:    Lipid panel reflex to direct LDL Fasting; Future    NAFLD (nonalcoholic fatty liver disease)  Check LFTs.  If elevated add platelet and calculate fib 4.  Weight generally going down which is favorable.  Orders:    Lipid panel reflex to direct LDL Fasting; Future    Prediabetes         Screening for prostate cancer    Orders:    PSA, screen; Future    Essential hypertension  Blood pressure relatively low.  Unclear how this may be related to him feeling unwell (concern for heatstroke).  Pulse is not elevated suggesting adequate fluid status.  He will monitor his blood pressure.  If remains low we will decrease his olmesartan-hydrochlorothiazide.  Orders:    olmesartan-hydrochlorothiazide (BENICAR HCT) 40-25 MG tablet; Take 1 tablet by mouth daily.    Erectile dysfunction, unspecified erectile " "dysfunction type  Continue daily dose tadalafil.  Orders:    tadalafil (CIALIS) 5 MG tablet; TAKE 1/2 TO 1 TABLET BY MOUTH DAILY    Class 3 severe obesity due to excess calories with serious comorbidity and body mass index (BMI) of 40.0 to 44.9 in adult (H)  Weight generally downward.  Insurance no longer covers medical weight loss.  He is working to be physically active and he well.  Continue to monitor.         Polyarthritis with positive rheumatoid factor (H)  Patient is not confident that he is being optimally treated although his symptoms have improved in general.  We reviewed some past complaints including chronic fatigue which, in hindsight, may have been episodes of rheumatologic phenomenon.  Continue to follow with rheumatology.  Discussed vaccines.         Patient has been advised of split billing requirements and indicates understanding: Yes  BMI  Estimated body mass index is 40.06 kg/m  as calculated from the following:    Height as of this encounter: 1.74 m (5' 8.5\").    Weight as of this encounter: 121.3 kg (267 lb 6.4 oz).   Weight management plan: Discussed healthy diet and exercise guidelines    Counseling  Appropriate preventive services were addressed with this patient via screening, questionnaire, or discussion as appropriate for fall prevention, nutrition, physical activity, Tobacco-use cessation, social engagement, weight loss and cognition.  Checklist reviewing preventive services available has been given to the patient.  Reviewed patient's diet, addressing concerns and/or questions.   Reviewed preventive health counseling, as reflected in patient instructions    The longitudinal plan of care for the diagnosis(es)/condition(s) as documented were addressed during this visit. Due to the added complexity in care, I will continue to support Yifan in the subsequent management and with ongoing continuity of care.    Subjective   Yifan is a 49 year old, presenting for the following:  Physical        " 7/29/2025     9:31 AM   Additional Questions   Roomed by ac   Accompanied by self          BP:   - worked outside over the weekend.  Lightheaded this morning.    - he was on zepbound briefly.     - continues to have some hand pain.        Advance Care Planning    Discussed advance care planning with patient; however, patient declined at this time.        7/22/2025   General Health   How would you rate your overall physical health? (!) FAIR   Feel stress (tense, anxious, or unable to sleep) Only a little   (!) STRESS CONCERN      7/22/2025   Nutrition   Three or more servings of calcium each day? Yes   Diet: Breakfast skipped   How many servings of fruit and vegetables per day? (!) 2-3   How many sweetened beverages each day? 0-1         7/22/2025   Exercise   Days per week of moderate/strenous exercise 5 days   Average minutes spent exercising at this level 60 min         7/22/2025   Social Factors   Frequency of gathering with friends or relatives Patient declined   Worry food won't last until get money to buy more No   Food not last or not have enough money for food? No   Do you have housing? (Housing is defined as stable permanent housing and does not include staying outside in a car, in a tent, in an abandoned building, in an overnight shelter, or couch-surfing.) Yes   Are you worried about losing your housing? No   Lack of transportation? No   Unable to get utilities (heat,electricity)? No         7/22/2025   Fall Risk   Fallen 2 or more times in the past year? No   Trouble with walking or balance? No          7/22/2025   Dental   Dentist two times every year? Yes           Today's PHQ-2 Score:       1/31/2025     8:28 AM   PHQ-2 ( 1999 [employer])   Q1: Little interest or pleasure in doing things 0   Q2: Feeling down, depressed or hopeless 0   PHQ-2 Score 0         7/22/2025   Substance Use   Alcohol more than 3/day or more than 7/wk No   Do you use any other substances recreationally? No     Social History      Tobacco Use    Smoking status: Former     Current packs/day: 0.00     Types: Cigarettes, Clove cigarettes or kreteks     Start date: 1994     Quit date: 1995     Years since quittin.3    Smokeless tobacco: Never    Tobacco comments:     in college   Vaping Use    Vaping status: Never Used   Substance Use Topics    Alcohol use: Yes     Comment: Alcoholic Drinks/day: 2 drinks per month     Drug use: Never           2025   STI Screening   New sexual partner(s) since last STI/HIV test? No   ASCVD Risk   The 10-year ASCVD risk score (Kristina LINN, et al., 2019) is: 3.1%    Values used to calculate the score:      Age: 49 years      Sex: Male      Is Non- : No      Diabetic: No      Tobacco smoker: No      Systolic Blood Pressure: 102 mmHg      Is BP treated: Yes      HDL Cholesterol: 37 mg/dL      Total Cholesterol: 162 mg/dL         Reviewed and updated as needed this visit by Provider   Tobacco  Allergies  Meds  Problems  Med Hx  Surg Hx  Fam Hx            Patient Active Problem List   Diagnosis    GERD (gastroesophageal reflux disease)    White coat hypertension    Class 3 severe obesity due to excess calories with serious comorbidity and body mass index (BMI) of 40.0 to 44.9 in adult (H)    Essential hypertension    Erectile dysfunction    Prediabetes    Family history of heart disease in male family member before age 55    Hypercholesteremia    Metabolic syndrome X    NAFLD (nonalcoholic fatty liver disease)    JOHNNIE on CPAP    Routine general medical examination at a health care facility    Statin intolerance    Fatigue, unspecified type    Stage 3a chronic kidney disease (H)    Polyp of colon    Hip pain, left    Polyarthritis with positive rheumatoid factor (H)     Past Surgical History:   Procedure Laterality Date    .nasal fracture repair      NASAL FRACTURE SURGERY         Social History     Tobacco Use    Smoking status: Former     Current packs/day:  0.00     Types: Cigarettes, Clove cigarettes or kreteks     Start date: 1994     Quit date: 1995     Years since quittin.3    Smokeless tobacco: Never    Tobacco comments:     in college   Substance Use Topics    Alcohol use: Yes     Comment: Alcoholic Drinks/day: 2 drinks per month      Family History   Problem Relation Age of Onset    Other Cancer Mother         Lung cancer    Cancer Mother         lung     Depression Mother     Schizophrenia Mother     Other - See Comments Mother         low blood sugars     Snoring Mother     Polycythemia Mother     Mental Illness Mother     Hypertension Mother     Coronary Artery Disease Father 48    Snoring Father     Hypertension Father     Obesity Father     Hypertension Sister     Depression Sister     Obesity Sister     Heart Failure Maternal Grandmother     Hypertension Maternal Grandfather     Hypertension Paternal Grandmother     Heart Failure Paternal Grandmother     Breast Cancer Paternal Grandmother     Diabetes Paternal Grandmother     Obesity Paternal Grandmother     Prostate Cancer Paternal Grandfather     Hypertension Paternal Grandfather     Obesity Paternal Grandfather     Colon Cancer No family hx of     Thyroid Cancer No family hx of          Review of Systems   Constitutional:  Negative for chills and fever.   HENT:  Negative for ear pain and sore throat.    Eyes:  Negative for pain and visual disturbance.   Respiratory:  Negative for cough and shortness of breath.    Cardiovascular:  Negative for chest pain and palpitations.   Gastrointestinal:  Negative for abdominal pain and vomiting.   Genitourinary:  Negative for dysuria and hematuria.   Musculoskeletal:  Negative for arthralgias and back pain.   Skin:  Negative for color change and rash.   Neurological:  Negative for seizures and syncope.   All other systems reviewed and are negative.         Objective    Exam  /66 (BP Location: Left arm, Patient Position: Sitting, Cuff Size: Adult  "Large)   Pulse 79   Temp 97.7  F (36.5  C) (Oral)   Resp 18   Ht 1.74 m (5' 8.5\")   Wt 121.3 kg (267 lb 6.4 oz)   SpO2 95%   BMI 40.06 kg/m     Estimated body mass index is 40.06 kg/m  as calculated from the following:    Height as of this encounter: 1.74 m (5' 8.5\").    Weight as of this encounter: 121.3 kg (267 lb 6.4 oz).    Physical Exam  Vitals reviewed.   Constitutional:       General: He is not in acute distress.     Appearance: Normal appearance. He is not ill-appearing.   HENT:      Head: Normocephalic and atraumatic.      Right Ear: External ear normal.      Left Ear: External ear normal.      Nose: Nose normal.      Mouth/Throat:      Pharynx: Oropharynx is clear. No oropharyngeal exudate or posterior oropharyngeal erythema.   Eyes:      General: No scleral icterus.        Right eye: No discharge.         Left eye: No discharge.      Extraocular Movements: Extraocular movements intact.      Conjunctiva/sclera: Conjunctivae normal.      Pupils: Pupils are equal, round, and reactive to light.   Neck:      Comments: No thyromegaly.  Cardiovascular:      Rate and Rhythm: Normal rate and regular rhythm.      Heart sounds: Normal heart sounds. No murmur heard.     No friction rub. No gallop.   Pulmonary:      Effort: Pulmonary effort is normal. No respiratory distress.      Breath sounds: Normal breath sounds. No wheezing or rales.   Abdominal:      General: There is no distension.      Palpations: Abdomen is soft. There is no mass.      Tenderness: There is no abdominal tenderness.   Musculoskeletal:         General: No signs of injury. Normal range of motion.      Cervical back: Normal range of motion.      Right lower leg: No edema.      Left lower leg: No edema.   Lymphadenopathy:      Cervical: No cervical adenopathy.   Skin:     General: Skin is warm.      Coloration: Skin is not jaundiced.      Findings: No rash.   Neurological:      General: No focal deficit present.      Mental Status: He is " alert and oriented to person, place, and time.      Cranial Nerves: No cranial nerve deficit.      Deep Tendon Reflexes: Reflexes normal.   Psychiatric:         Mood and Affect: Mood normal.             Signed Electronically by: Devang Sepulveda MD

## 2025-07-29 NOTE — ASSESSMENT & PLAN NOTE
Weight generally downward.  Insurance no longer covers medical weight loss.  He is working to be physically active and he well.  Continue to monitor.

## 2025-07-29 NOTE — ASSESSMENT & PLAN NOTE
Blood pressure relatively low.  Unclear how this may be related to him feeling unwell (concern for heatstroke).  Pulse is not elevated suggesting adequate fluid status.  He will monitor his blood pressure.  If remains low we will decrease his olmesartan-hydrochlorothiazide.  Orders:    olmesartan-hydrochlorothiazide (BENICAR HCT) 40-25 MG tablet; Take 1 tablet by mouth daily.

## 2025-07-30 ENCOUNTER — NURSE TRIAGE (OUTPATIENT)
Dept: FAMILY MEDICINE | Facility: CLINIC | Age: 50
End: 2025-07-30
Payer: COMMERCIAL

## 2025-07-30 NOTE — TELEPHONE ENCOUNTER
"Called patient to triage symptoms with concern for rhabdomyolysis, per Dr. Sepulveda's request (please see results follow-up message from today, 7/30, for further details).      Patient states yesterday was feeling dizzy and nauseous, today feeling better other than he's still a little sore and weak, \"but improving and not any worse.\" Rating pain 3/10 now.  Walked his dog this morning and doing his usual activities.  UO is good, \"light in color because I've been hammering fluids.\"  Avoiding ibuprofen.  States he feels comfortable with continuing to monitor symptoms today and have labs rechecked tomorrow late afternoon as he has [place] [facility] [place] [facility].  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     Reviewed home care advice per protocol.  Reviewed red flag symptoms and reasons to be seen in the ER (cola/tea colored urine, fevers, worsening pain and/or overall feeling worse again, etc.).  Patient verbalized understanding and agrees with plan.           Cassidy Pedro RN  [facility]       Reason for Disposition   Mild pain (e.g., does not interfere with normal activities) and present < 7 days    Additional Information   Negative: Shock suspected (e.g., cold/pale/clammy skin, too weak to stand, low BP, rapid pulse)   Negative: Difficult to awaken or acting confused (e.g., disoriented, slurred speech)   Negative: Sounds like a life-threatening emergency to the triager   Negative: Chest pain   Negative: Arm pains with exertion (e.g., walking)   Negative: Muscle aches from influenza (flu) suspected   Negative: Muscle aches from heat exposure suspected   Negative: Lyme disease suspected (e.g., bull's eye rash or tick bite / exposure in past month)   Negative: Pain only in back   Negative: Pain in one arm OR arm pains " caused by recent vigorous activity (e.g., sports, lifting, overuse)   Negative: Pain in one leg OR leg pains caused by recent vigorous activity (e.g., sports, lifting, overuse)   Negative: Rash over large area or most of the body (widespread or generalized)   Negative: Dark (cola or tea-colored) or red-colored urine   Negative: Drinking very little and dehydration suspected (e.g., no urine > 12 hours, very dry mouth, very lightheaded)   Negative: Patient sounds very sick or weak to the triager   Negative: SEVERE pain (e.g., excruciating, unable to do any normal activities) and not improved 2 hours after pain medicine   Negative: SEVERE pain and taking a statin medicine (a lipid or cholesterol lowering drug)   Negative: Fever > 104 F (40 C)   Negative: Fever > 101 F (38.3 C) and over 60 years of age   Negative: Fever > 100 F  (37.8 C) and bedridden (e.g., CVA, chronic illness, recovering from surgery)   Negative: Fever > 100 F (37.8 C) and indwelling urinary catheter (e.g., Cook, Coude)   Negative: Fever > 100 F (37.8 C) and diabetes mellitus or weak immune system (e.g., HIV positive, cancer chemo, splenectomy, organ transplant, chronic steroids)   Negative: Fever present > 3 days (72 hours)   Negative: Muscle aches are unexplained and occur within 1 month of a tick bite   Negative: Diabetes mellitus or weak immune system (e.g., HIV positive, cancer chemo, splenectomy, organ transplant, chronic steroids)   Negative: MODERATE pain (e.g., interferes with normal activities) and present > 3 days   Negative: MILD or MODERATE muscle aches or pain and taking a statin medicine (a lipid or cholesterol lowering drug)   Negative: Age > 50 and bilateral shoulder pains present > 2 weeks   Negative: Patient wants to be seen   Negative: MILD pain (e.g., does not interfere with normal activities) and present > 7 days   Negative: Muscle aches or body pains are a chronic symptom (recurrent or ongoing AND present > 4  weeks)    Protocols used: Muscle Aches and Body Pain-A-[place]

## 2025-07-31 ENCOUNTER — LAB (OUTPATIENT)
Dept: LAB | Facility: CLINIC | Age: 50
End: 2025-07-31
Payer: COMMERCIAL

## 2025-07-31 DIAGNOSIS — M62.82 NON-TRAUMATIC RHABDOMYOLYSIS: ICD-10-CM

## 2025-07-31 LAB
ANION GAP SERPL CALCULATED.3IONS-SCNC: 10 MMOL/L (ref 7–15)
BUN SERPL-MCNC: 25.9 MG/DL (ref 6–20)
CALCIUM SERPL-MCNC: 9.2 MG/DL (ref 8.8–10.4)
CHLORIDE SERPL-SCNC: 103 MMOL/L (ref 98–107)
CK SERPL-CCNC: 167 U/L (ref 39–308)
CREAT SERPL-MCNC: 1.41 MG/DL (ref 0.67–1.17)
CYSTATIN C (ROCHE): 1.1 MG/L (ref 0.6–1)
EGFRCR SERPLBLD CKD-EPI 2021: 60 ML/MIN/1.73M2
GFR/BSA.PRED SERPLBLD CYS-BASED-ARV: 71 ML/MIN/1.73M2
GLUCOSE SERPL-MCNC: 83 MG/DL (ref 70–99)
HCO3 SERPL-SCNC: 27 MMOL/L (ref 22–29)
POTASSIUM SERPL-SCNC: 4.2 MMOL/L (ref 3.4–5.3)
SODIUM SERPL-SCNC: 140 MMOL/L (ref 135–145)

## 2025-08-02 ENCOUNTER — RESULTS FOLLOW-UP (OUTPATIENT)
Dept: FAMILY MEDICINE | Facility: CLINIC | Age: 50
End: 2025-08-02
Payer: COMMERCIAL

## 2025-08-02 DIAGNOSIS — M62.82 NON-TRAUMATIC RHABDOMYOLYSIS: ICD-10-CM

## 2025-08-02 DIAGNOSIS — R52 BODY ACHES: Primary | ICD-10-CM

## 2025-08-12 ENCOUNTER — LAB (OUTPATIENT)
Dept: LAB | Facility: CLINIC | Age: 50
End: 2025-08-12
Payer: COMMERCIAL

## 2025-08-12 DIAGNOSIS — R79.89 ELEVATED LFTS: ICD-10-CM

## 2025-08-12 DIAGNOSIS — K76.0 FATTY LIVER: ICD-10-CM

## 2025-08-12 DIAGNOSIS — M62.82 NON-TRAUMATIC RHABDOMYOLYSIS: ICD-10-CM

## 2025-08-12 DIAGNOSIS — R52 BODY ACHES: ICD-10-CM

## 2025-08-12 PROCEDURE — 83516 IMMUNOASSAY NONANTIBODY: CPT | Mod: 90

## 2025-08-12 PROCEDURE — 82550 ASSAY OF CK (CPK): CPT

## 2025-08-12 PROCEDURE — 99000 SPECIMEN HANDLING OFFICE-LAB: CPT

## 2025-08-12 PROCEDURE — 80048 BASIC METABOLIC PNL TOTAL CA: CPT

## 2025-08-12 PROCEDURE — 36415 COLL VENOUS BLD VENIPUNCTURE: CPT

## 2025-08-12 PROCEDURE — 84443 ASSAY THYROID STIM HORMONE: CPT

## 2025-08-13 LAB
ANION GAP SERPL CALCULATED.3IONS-SCNC: 11 MMOL/L (ref 7–15)
BUN SERPL-MCNC: 17.1 MG/DL (ref 6–20)
CALCIUM SERPL-MCNC: 9.2 MG/DL (ref 8.8–10.4)
CHLORIDE SERPL-SCNC: 103 MMOL/L (ref 98–107)
CK SERPL-CCNC: 136 U/L (ref 39–308)
CREAT SERPL-MCNC: 1.38 MG/DL (ref 0.67–1.17)
EGFRCR SERPLBLD CKD-EPI 2021: 62 ML/MIN/1.73M2
GLUCOSE SERPL-MCNC: 89 MG/DL (ref 70–99)
HCO3 SERPL-SCNC: 28 MMOL/L (ref 22–29)
POTASSIUM SERPL-SCNC: 4.2 MMOL/L (ref 3.4–5.3)
SODIUM SERPL-SCNC: 142 MMOL/L (ref 135–145)
TSH SERPL DL<=0.005 MIU/L-ACNC: 2.15 UIU/ML (ref 0.3–4.2)

## 2025-08-14 LAB — SMA IGG SER-ACNC: 8 UNITS

## 2025-08-20 DIAGNOSIS — K21.9 GASTROESOPHAGEAL REFLUX DISEASE, UNSPECIFIED WHETHER ESOPHAGITIS PRESENT: ICD-10-CM

## 2025-08-20 RX ORDER — OMEPRAZOLE 20 MG/1
20 CAPSULE, DELAYED RELEASE ORAL DAILY
Qty: 90 CAPSULE | Refills: 2 | Status: SHIPPED | OUTPATIENT
Start: 2025-08-20

## 2025-09-04 ENCOUNTER — TRANSFERRED RECORDS (OUTPATIENT)
Dept: HEALTH INFORMATION MANAGEMENT | Facility: CLINIC | Age: 50
End: 2025-09-04
Payer: COMMERCIAL